# Patient Record
Sex: MALE | Race: WHITE | NOT HISPANIC OR LATINO | Employment: FULL TIME | URBAN - METROPOLITAN AREA
[De-identification: names, ages, dates, MRNs, and addresses within clinical notes are randomized per-mention and may not be internally consistent; named-entity substitution may affect disease eponyms.]

---

## 2021-06-01 ENCOUNTER — HOSPITAL ENCOUNTER (EMERGENCY)
Facility: HOSPITAL | Age: 56
Discharge: HOME | End: 2021-06-01
Attending: EMERGENCY MEDICINE
Payer: OTHER GOVERNMENT

## 2021-06-01 VITALS
TEMPERATURE: 99 F | OXYGEN SATURATION: 98 % | SYSTOLIC BLOOD PRESSURE: 156 MMHG | RESPIRATION RATE: 16 BRPM | HEIGHT: 74 IN | DIASTOLIC BLOOD PRESSURE: 91 MMHG | WEIGHT: 315 LBS | HEART RATE: 70 BPM | BODY MASS INDEX: 40.43 KG/M2

## 2021-06-01 DIAGNOSIS — M79.604 RIGHT LEG PAIN: ICD-10-CM

## 2021-06-01 DIAGNOSIS — M79.671 RIGHT FOOT PAIN: ICD-10-CM

## 2021-06-01 DIAGNOSIS — S82.831A CLOSED FRACTURE OF PROXIMAL END OF RIGHT FIBULA, UNSPECIFIED FRACTURE MORPHOLOGY, INITIAL ENCOUNTER: Primary | ICD-10-CM

## 2021-06-01 DIAGNOSIS — M25.571 RIGHT ANKLE PAIN: ICD-10-CM

## 2021-06-01 PROCEDURE — 99283 EMERGENCY DEPT VISIT LOW MDM: CPT | Mod: 25

## 2021-06-01 PROCEDURE — 29505 APPLICATION LONG LEG SPLINT: CPT | Mod: RT

## 2021-06-01 PROCEDURE — 25000003 PHARM REV CODE 250: Performed by: NURSE PRACTITIONER

## 2021-06-01 RX ORDER — HYDROCODONE BITARTRATE AND ACETAMINOPHEN 7.5; 325 MG/1; MG/1
1 TABLET ORAL EVERY 6 HOURS PRN
Qty: 12 TABLET | Refills: 0 | Status: ON HOLD | OUTPATIENT
Start: 2021-06-01 | End: 2023-06-27

## 2021-06-01 RX ORDER — HYDROCODONE BITARTRATE AND ACETAMINOPHEN 10; 325 MG/1; MG/1
1 TABLET ORAL
Status: COMPLETED | OUTPATIENT
Start: 2021-06-01 | End: 2021-06-01

## 2021-06-01 RX ORDER — ONDANSETRON 4 MG/1
4 TABLET, ORALLY DISINTEGRATING ORAL
Status: COMPLETED | OUTPATIENT
Start: 2021-06-01 | End: 2021-06-01

## 2021-06-01 RX ORDER — ONDANSETRON 2 MG/ML
4 INJECTION INTRAMUSCULAR; INTRAVENOUS
Status: DISCONTINUED | OUTPATIENT
Start: 2021-06-01 | End: 2021-06-01

## 2021-06-01 RX ADMIN — ONDANSETRON 4 MG: 4 TABLET, ORALLY DISINTEGRATING ORAL at 10:06

## 2021-06-01 RX ADMIN — HYDROCODONE BITARTRATE AND ACETAMINOPHEN 1 TABLET: 10; 325 TABLET ORAL at 10:06

## 2021-06-02 ENCOUNTER — TELEPHONE (OUTPATIENT)
Dept: ORTHOPEDICS | Facility: CLINIC | Age: 56
End: 2021-06-02

## 2021-06-04 ENCOUNTER — OFFICE VISIT (OUTPATIENT)
Dept: ORTHOPEDICS | Facility: CLINIC | Age: 56
End: 2021-06-04
Payer: OTHER GOVERNMENT

## 2021-06-04 VITALS
WEIGHT: 315 LBS | DIASTOLIC BLOOD PRESSURE: 94 MMHG | HEART RATE: 78 BPM | SYSTOLIC BLOOD PRESSURE: 192 MMHG | HEIGHT: 74 IN | BODY MASS INDEX: 40.43 KG/M2

## 2021-06-04 DIAGNOSIS — M25.561 RIGHT KNEE PAIN, UNSPECIFIED CHRONICITY: Primary | ICD-10-CM

## 2021-06-04 DIAGNOSIS — E66.01 OBESITY, CLASS III, BMI 40-49.9 (MORBID OBESITY): ICD-10-CM

## 2021-06-04 DIAGNOSIS — L40.9 PSORIASIS: ICD-10-CM

## 2021-06-04 DIAGNOSIS — S82.831A OTHER CLOSED FRACTURE OF PROXIMAL END OF RIGHT FIBULA, INITIAL ENCOUNTER: ICD-10-CM

## 2021-06-04 DIAGNOSIS — S93.401A MODERATE ANKLE SPRAIN, RIGHT, INITIAL ENCOUNTER: ICD-10-CM

## 2021-06-04 PROBLEM — E66.813 OBESITY, CLASS III, BMI 40-49.9 (MORBID OBESITY): Status: ACTIVE | Noted: 2021-06-04

## 2021-06-04 PROCEDURE — 99999 PR PBB SHADOW E&M-EST. PATIENT-LVL III: CPT | Mod: PBBFAC,,,

## 2021-06-04 PROCEDURE — 99202 PR OFFICE/OUTPT VISIT, NEW, LEVL II, 15-29 MIN: ICD-10-PCS | Mod: S$PBB,,, | Performed by: ORTHOPAEDIC SURGERY

## 2021-06-04 PROCEDURE — 99999 PR PBB SHADOW E&M-EST. PATIENT-LVL III: ICD-10-PCS | Mod: PBBFAC,,,

## 2021-06-04 PROCEDURE — 99202 OFFICE O/P NEW SF 15 MIN: CPT | Mod: S$PBB,,, | Performed by: ORTHOPAEDIC SURGERY

## 2021-06-04 PROCEDURE — 99213 OFFICE O/P EST LOW 20 MIN: CPT | Mod: PBBFAC

## 2021-06-08 ENCOUNTER — TELEPHONE (OUTPATIENT)
Dept: INTERNAL MEDICINE | Facility: CLINIC | Age: 56
End: 2021-06-08

## 2021-06-25 ENCOUNTER — HOSPITAL ENCOUNTER (OUTPATIENT)
Dept: RADIOLOGY | Facility: HOSPITAL | Age: 56
Discharge: HOME | End: 2021-06-25
Attending: ORTHOPAEDIC SURGERY
Payer: OTHER GOVERNMENT

## 2021-06-25 ENCOUNTER — OFFICE VISIT (OUTPATIENT)
Dept: ORTHOPEDICS | Facility: CLINIC | Age: 56
End: 2021-06-25
Payer: OTHER GOVERNMENT

## 2021-06-25 ENCOUNTER — HOSPITAL ENCOUNTER (OUTPATIENT)
Dept: RADIOLOGY | Facility: HOSPITAL | Age: 56
Discharge: HOME | End: 2021-06-25
Attending: PHYSICIAN ASSISTANT
Payer: OTHER GOVERNMENT

## 2021-06-25 VITALS
HEART RATE: 79 BPM | DIASTOLIC BLOOD PRESSURE: 97 MMHG | SYSTOLIC BLOOD PRESSURE: 176 MMHG | BODY MASS INDEX: 40.43 KG/M2 | WEIGHT: 315 LBS | HEIGHT: 74 IN

## 2021-06-25 DIAGNOSIS — S93.401A MODERATE ANKLE SPRAIN, RIGHT, INITIAL ENCOUNTER: ICD-10-CM

## 2021-06-25 DIAGNOSIS — S93.401A MODERATE ANKLE SPRAIN, RIGHT, INITIAL ENCOUNTER: Primary | ICD-10-CM

## 2021-06-25 DIAGNOSIS — S93.401S MODERATE RIGHT ANKLE SPRAIN, SEQUELA: ICD-10-CM

## 2021-06-25 DIAGNOSIS — S82.811D CLOSED TORUS FRACTURE OF PROXIMAL END OF RIGHT FIBULA WITH ROUTINE HEALING, SUBSEQUENT ENCOUNTER: Primary | ICD-10-CM

## 2021-06-25 DIAGNOSIS — M25.561 RIGHT KNEE PAIN, UNSPECIFIED CHRONICITY: ICD-10-CM

## 2021-06-25 PROCEDURE — 73564 X-RAY EXAM KNEE 4 OR MORE: CPT | Mod: 26,RT,, | Performed by: RADIOLOGY

## 2021-06-25 PROCEDURE — 73562 X-RAY EXAM OF KNEE 3: CPT | Mod: 26,LT,, | Performed by: RADIOLOGY

## 2021-06-25 PROCEDURE — 99999 PR PBB SHADOW E&M-EST. PATIENT-LVL III: CPT | Mod: PBBFAC,,, | Performed by: ORTHOPAEDIC SURGERY

## 2021-06-25 PROCEDURE — 99213 OFFICE O/P EST LOW 20 MIN: CPT | Mod: S$PBB,,, | Performed by: ORTHOPAEDIC SURGERY

## 2021-06-25 PROCEDURE — 73562 XR KNEE ORTHO RIGHT WITH FLEXION: ICD-10-PCS | Mod: 26,LT,, | Performed by: RADIOLOGY

## 2021-06-25 PROCEDURE — 73610 XR ANKLE COMPLETE 3 VIEW RIGHT: ICD-10-PCS | Mod: 26,RT,, | Performed by: RADIOLOGY

## 2021-06-25 PROCEDURE — 99213 PR OFFICE/OUTPT VISIT, EST, LEVL III, 20-29 MIN: ICD-10-PCS | Mod: S$PBB,,, | Performed by: ORTHOPAEDIC SURGERY

## 2021-06-25 PROCEDURE — 73564 X-RAY EXAM KNEE 4 OR MORE: CPT | Mod: TC,RT

## 2021-06-25 PROCEDURE — 73610 X-RAY EXAM OF ANKLE: CPT | Mod: TC,RT

## 2021-06-25 PROCEDURE — 99999 PR PBB SHADOW E&M-EST. PATIENT-LVL III: ICD-10-PCS | Mod: PBBFAC,,, | Performed by: ORTHOPAEDIC SURGERY

## 2021-06-25 PROCEDURE — 73610 X-RAY EXAM OF ANKLE: CPT | Mod: 26,RT,, | Performed by: RADIOLOGY

## 2021-06-25 PROCEDURE — 99213 OFFICE O/P EST LOW 20 MIN: CPT | Mod: PBBFAC,25 | Performed by: ORTHOPAEDIC SURGERY

## 2021-06-25 PROCEDURE — 73564 XR KNEE ORTHO RIGHT WITH FLEXION: ICD-10-PCS | Mod: 26,RT,, | Performed by: RADIOLOGY

## 2023-05-22 ENCOUNTER — HOSPITAL ENCOUNTER (EMERGENCY)
Facility: HOSPITAL | Age: 58
Discharge: HOME | End: 2023-05-22
Attending: EMERGENCY MEDICINE
Payer: OTHER GOVERNMENT

## 2023-05-22 VITALS
HEART RATE: 95 BPM | WEIGHT: 315 LBS | BODY MASS INDEX: 40.43 KG/M2 | TEMPERATURE: 99 F | DIASTOLIC BLOOD PRESSURE: 83 MMHG | OXYGEN SATURATION: 96 % | SYSTOLIC BLOOD PRESSURE: 145 MMHG | HEIGHT: 74 IN | RESPIRATION RATE: 14 BRPM

## 2023-05-22 DIAGNOSIS — E66.01 MORBID OBESITY: ICD-10-CM

## 2023-05-22 DIAGNOSIS — R53.83 FATIGUE: ICD-10-CM

## 2023-05-22 DIAGNOSIS — R79.89 ELEVATED BRAIN NATRIURETIC PEPTIDE (BNP) LEVEL: ICD-10-CM

## 2023-05-22 DIAGNOSIS — I48.91 NEW ONSET ATRIAL FIBRILLATION: Primary | ICD-10-CM

## 2023-05-22 DIAGNOSIS — I48.91 ATRIAL FIBRILLATION: ICD-10-CM

## 2023-05-22 LAB
ALBUMIN SERPL BCP-MCNC: 3.6 G/DL (ref 3.5–5.2)
ALP SERPL-CCNC: 68 U/L (ref 55–135)
ALT SERPL W/O P-5'-P-CCNC: 26 U/L (ref 10–44)
ANION GAP SERPL CALC-SCNC: 11 MMOL/L (ref 8–16)
AST SERPL-CCNC: 22 U/L (ref 10–40)
BASOPHILS # BLD AUTO: 0.04 K/UL (ref 0–0.2)
BASOPHILS NFR BLD: 0.6 % (ref 0–1.9)
BILIRUB SERPL-MCNC: 1.3 MG/DL (ref 0.1–1)
BNP SERPL-MCNC: 187 PG/ML (ref 0–99)
BUN SERPL-MCNC: 12 MG/DL (ref 6–20)
CALCIUM SERPL-MCNC: 9.2 MG/DL (ref 8.7–10.5)
CHLORIDE SERPL-SCNC: 103 MMOL/L (ref 95–110)
CO2 SERPL-SCNC: 22 MMOL/L (ref 23–29)
CREAT SERPL-MCNC: 0.8 MG/DL (ref 0.5–1.4)
DIFFERENTIAL METHOD: ABNORMAL
EOSINOPHIL # BLD AUTO: 0.1 K/UL (ref 0–0.5)
EOSINOPHIL NFR BLD: 1.6 % (ref 0–8)
ERYTHROCYTE [DISTWIDTH] IN BLOOD BY AUTOMATED COUNT: 14.8 % (ref 11.5–14.5)
EST. GFR  (NO RACE VARIABLE): >60 ML/MIN/1.73 M^2
GLUCOSE SERPL-MCNC: 151 MG/DL (ref 70–110)
HCT VFR BLD AUTO: 41.3 % (ref 40–54)
HCV AB SERPL QL IA: NEGATIVE
HEP C VIRUS HOLD SPECIMEN: NORMAL
HGB BLD-MCNC: 13.2 G/DL (ref 14–18)
HIV 1+2 AB+HIV1 P24 AG SERPL QL IA: NEGATIVE
IMM GRANULOCYTES # BLD AUTO: 0.03 K/UL (ref 0–0.04)
IMM GRANULOCYTES NFR BLD AUTO: 0.4 % (ref 0–0.5)
LYMPHOCYTES # BLD AUTO: 1.1 K/UL (ref 1–4.8)
LYMPHOCYTES NFR BLD: 16.5 % (ref 18–48)
MAGNESIUM SERPL-MCNC: 1.8 MG/DL (ref 1.6–2.6)
MCH RBC QN AUTO: 26.5 PG (ref 27–31)
MCHC RBC AUTO-ENTMCNC: 32 G/DL (ref 32–36)
MCV RBC AUTO: 83 FL (ref 82–98)
MONOCYTES # BLD AUTO: 0.8 K/UL (ref 0.3–1)
MONOCYTES NFR BLD: 11.1 % (ref 4–15)
NEUTROPHILS # BLD AUTO: 4.8 K/UL (ref 1.8–7.7)
NEUTROPHILS NFR BLD: 69.8 % (ref 38–73)
NRBC BLD-RTO: 0 /100 WBC
PLATELET # BLD AUTO: 154 K/UL (ref 150–450)
PMV BLD AUTO: 9.9 FL (ref 9.2–12.9)
POTASSIUM SERPL-SCNC: 3.9 MMOL/L (ref 3.5–5.1)
PROT SERPL-MCNC: 7.7 G/DL (ref 6–8.4)
RBC # BLD AUTO: 4.98 M/UL (ref 4.6–6.2)
SODIUM SERPL-SCNC: 136 MMOL/L (ref 136–145)
TROPONIN I SERPL DL<=0.01 NG/ML-MCNC: 0.01 NG/ML (ref 0–0.03)
TSH SERPL DL<=0.005 MIU/L-ACNC: 3.54 UIU/ML (ref 0.4–4)
WBC # BLD AUTO: 6.84 K/UL (ref 3.9–12.7)

## 2023-05-22 PROCEDURE — 80053 COMPREHEN METABOLIC PANEL: CPT | Performed by: EMERGENCY MEDICINE

## 2023-05-22 PROCEDURE — 99285 EMERGENCY DEPT VISIT HI MDM: CPT | Mod: 25

## 2023-05-22 PROCEDURE — 85025 COMPLETE CBC W/AUTO DIFF WBC: CPT | Performed by: EMERGENCY MEDICINE

## 2023-05-22 PROCEDURE — 63600175 PHARM REV CODE 636 W HCPCS: Performed by: EMERGENCY MEDICINE

## 2023-05-22 PROCEDURE — 96375 TX/PRO/DX INJ NEW DRUG ADDON: CPT

## 2023-05-22 PROCEDURE — 93005 ELECTROCARDIOGRAM TRACING: CPT

## 2023-05-22 PROCEDURE — 83735 ASSAY OF MAGNESIUM: CPT | Performed by: EMERGENCY MEDICINE

## 2023-05-22 PROCEDURE — 93010 EKG 12-LEAD: ICD-10-PCS | Mod: ,,, | Performed by: INTERNAL MEDICINE

## 2023-05-22 PROCEDURE — 96374 THER/PROPH/DIAG INJ IV PUSH: CPT

## 2023-05-22 PROCEDURE — 86803 HEPATITIS C AB TEST: CPT | Performed by: EMERGENCY MEDICINE

## 2023-05-22 PROCEDURE — 84443 ASSAY THYROID STIM HORMONE: CPT | Performed by: EMERGENCY MEDICINE

## 2023-05-22 PROCEDURE — 93010 ELECTROCARDIOGRAM REPORT: CPT | Mod: ,,, | Performed by: INTERNAL MEDICINE

## 2023-05-22 PROCEDURE — 25000003 PHARM REV CODE 250: Performed by: EMERGENCY MEDICINE

## 2023-05-22 PROCEDURE — 83880 ASSAY OF NATRIURETIC PEPTIDE: CPT | Performed by: EMERGENCY MEDICINE

## 2023-05-22 PROCEDURE — 87389 HIV-1 AG W/HIV-1&-2 AB AG IA: CPT | Performed by: EMERGENCY MEDICINE

## 2023-05-22 PROCEDURE — 84484 ASSAY OF TROPONIN QUANT: CPT | Performed by: EMERGENCY MEDICINE

## 2023-05-22 RX ORDER — ASPIRIN 325 MG
325 TABLET ORAL
Status: COMPLETED | OUTPATIENT
Start: 2023-05-22 | End: 2023-05-22

## 2023-05-22 RX ORDER — METOPROLOL SUCCINATE 25 MG/1
25 TABLET, EXTENDED RELEASE ORAL DAILY
Qty: 30 TABLET | Refills: 0 | Status: SHIPPED | OUTPATIENT
Start: 2023-05-22 | End: 2023-07-12 | Stop reason: SDUPTHER

## 2023-05-22 RX ORDER — FUROSEMIDE 20 MG/1
20 TABLET ORAL DAILY
Qty: 7 TABLET | Refills: 0 | Status: SHIPPED | OUTPATIENT
Start: 2023-05-22 | End: 2023-06-14

## 2023-05-22 RX ORDER — FUROSEMIDE 10 MG/ML
40 INJECTION INTRAMUSCULAR; INTRAVENOUS
Status: COMPLETED | OUTPATIENT
Start: 2023-05-22 | End: 2023-05-22

## 2023-05-22 RX ORDER — DILTIAZEM HYDROCHLORIDE 5 MG/ML
20 INJECTION INTRAVENOUS
Status: COMPLETED | OUTPATIENT
Start: 2023-05-22 | End: 2023-05-22

## 2023-05-22 RX ADMIN — ASPIRIN 325 MG: 325 TABLET ORAL at 07:05

## 2023-05-22 RX ADMIN — FUROSEMIDE 40 MG: 10 INJECTION, SOLUTION INTRAMUSCULAR; INTRAVENOUS at 07:05

## 2023-05-22 RX ADMIN — DILTIAZEM HYDROCHLORIDE 20 MG: 5 INJECTION INTRAVENOUS at 05:05

## 2023-05-22 NOTE — FIRST PROVIDER EVALUATION
Medical screening examination initiated.  I have conducted a focused provider triage encounter, findings are as follows:    Brief history of present illness:  reports fatigue, sob    There were no vitals filed for this visit.    Pertinent physical exam:  nad    Brief workup plan:  labs, ekg, imaging, further eval    Preliminary workup initiated; this workup will be continued and followed by the physician or advanced practice provider that is assigned to the patient when roomed.

## 2023-05-22 NOTE — ED PROVIDER NOTES
SCRIBE #1 NOTE: I, Ginna Mccarthy, am scribing for, and in the presence of, Mary Polk MD. I have scribed the entire note.       History     Chief Complaint   Patient presents with    Fatigue     Fatigue started last 3 days, short of breath, denies chest pain.     Review of patient's allergies indicates:   Allergen Reactions    Sulfa (sulfonamide antibiotics) Rash         History of Present Illness     HPI    5/22/2023, 4:53 PM  History obtained from the patient      History of Present Illness: Roland Lopez is a 58 y.o. male patient with a PMHx of HTN who presents to the Emergency Department for evaluation of fatigue which onset in the past few days. Pt describes being overly tired and sleeping significantly more so than normal. Symptoms are constant and moderate in severity. No mitigating or exacerbating factors reported. Associated sxs include some intermittent CP and SOB. Patient denies any dizziness, syncope, fever, N/V/D, headache, congestion, and all other sxs at this time. No Prior Tx reported. No further complaints or concerns at this time.       Arrival mode: Personal vehicle    PCP: Lashay Asencio MD        Past Medical History:  Past Medical History:   Diagnosis Date    Hypertension     EVELYN (obstructive sleep apnea)        Past Surgical History:  History reviewed. No pertinent surgical history.      Family History:  History reviewed. No pertinent family history.    Social History:  Social History     Tobacco Use    Smoking status: Never    Smokeless tobacco: Never   Substance and Sexual Activity    Alcohol use: Not Currently     Alcohol/week: 1.0 standard drink     Types: 1 Glasses of wine per week    Drug use: Never    Sexual activity: Yes     Partners: Female        Review of Systems     Review of Systems   Constitutional:  Positive for fatigue. Negative for fever.   HENT:  Negative for congestion and sore throat.    Respiratory:  Positive for shortness of breath.    Cardiovascular:  Positive  "for chest pain.   Gastrointestinal:  Negative for diarrhea, nausea and vomiting.   Genitourinary:  Negative for dysuria.   Musculoskeletal:  Negative for back pain.   Skin:  Negative for rash.   Neurological:  Negative for weakness and headaches.   Hematological:  Does not bruise/bleed easily.      Physical Exam     Initial Vitals [05/22/23 1604]   BP Pulse Resp Temp SpO2   (!) 177/100 97 20 99.8 °F (37.7 °C) 96 %      MAP       --          Physical Exam  Nursing Notes and Vital Signs Reviewed.  Constitutional: Patient is in no acute distress. Morbidly obese.  Head: Atraumatic. Normocephalic.  Eyes: PERRL. EOM intact. Conjunctivae are not pale. No scleral icterus.  ENT: Mucous membranes are moist. Oropharynx is clear and symmetric.    Neck: Supple. Full ROM. No lymphadenopathy.  Cardiovascular: Regular rate. Irregularly irregular rhythm. No murmurs, rubs, or gallops. Distal pulses are 2+ and symmetric.  Pulmonary/Chest: No respiratory distress. Clear to auscultation bilaterally. No wheezing or rales.  Abdominal: Soft and non-distended.  There is no tenderness.  No rebound, guarding, or rigidity. Good bowel sounds.  Genitourinary: No CVA tenderness  Musculoskeletal: Moves all extremities. No obvious deformities. No edema. No calf tenderness.  Skin: Warm and dry.  Neurological:  Alert, awake, and appropriate.  Normal speech.  No acute focal neurological deficits are appreciated.  Psychiatric: Normal affect. Good eye contact. Appropriate in content.     ED Course   Procedures  ED Vital Signs:  Vitals:    05/22/23 1604 05/22/23 1731 05/22/23 1804 05/22/23 1817   BP: (!) 177/100 (!) 151/81 137/78    Pulse: 97   94   Resp: 20   20   Temp: 99.8 °F (37.7 °C)      TempSrc: Oral      SpO2: 96%   96%   Weight: (!) 159.8 kg (352 lb 4.7 oz)      Height: 6' 2" (1.88 m)       05/22/23 1906 05/22/23 1910   BP: (!) 145/83    Pulse: 95    Resp: 14    Temp:  98.9 °F (37.2 °C)   TempSrc:     SpO2: 96%    Weight:     Height:   "       Abnormal Lab Results:  Labs Reviewed   CBC W/ AUTO DIFFERENTIAL - Abnormal; Notable for the following components:       Result Value    Hemoglobin 13.2 (*)     MCH 26.5 (*)     RDW 14.8 (*)     Lymph % 16.5 (*)     All other components within normal limits   COMPREHENSIVE METABOLIC PANEL - Abnormal; Notable for the following components:    CO2 22 (*)     Glucose 151 (*)     Total Bilirubin 1.3 (*)     All other components within normal limits   B-TYPE NATRIURETIC PEPTIDE - Abnormal; Notable for the following components:     (*)     All other components within normal limits   HIV 1 / 2 ANTIBODY    Narrative:     Release to patient->Immediate   HEPATITIS C ANTIBODY    Narrative:     Release to patient->Immediate   HEP C VIRUS HOLD SPECIMEN    Narrative:     Release to patient->Immediate   TROPONIN I   TSH   MAGNESIUM        All Lab Results:  Results for orders placed or performed during the hospital encounter of 05/22/23   HIV 1/2 Ag/Ab (4th Gen)   Result Value Ref Range    HIV 1/2 Ag/Ab Negative Negative   Hepatitis C Antibody   Result Value Ref Range    Hepatitis C Ab Negative Negative   HCV Virus Hold Specimen   Result Value Ref Range    HEP C Virus Hold Specimen Hold for HCV sendout    CBC auto differential   Result Value Ref Range    WBC 6.84 3.90 - 12.70 K/uL    RBC 4.98 4.60 - 6.20 M/uL    Hemoglobin 13.2 (L) 14.0 - 18.0 g/dL    Hematocrit 41.3 40.0 - 54.0 %    MCV 83 82 - 98 fL    MCH 26.5 (L) 27.0 - 31.0 pg    MCHC 32.0 32.0 - 36.0 g/dL    RDW 14.8 (H) 11.5 - 14.5 %    Platelets 154 150 - 450 K/uL    MPV 9.9 9.2 - 12.9 fL    Immature Granulocytes 0.4 0.0 - 0.5 %    Gran # (ANC) 4.8 1.8 - 7.7 K/uL    Immature Grans (Abs) 0.03 0.00 - 0.04 K/uL    Lymph # 1.1 1.0 - 4.8 K/uL    Mono # 0.8 0.3 - 1.0 K/uL    Eos # 0.1 0.0 - 0.5 K/uL    Baso # 0.04 0.00 - 0.20 K/uL    nRBC 0 0 /100 WBC    Gran % 69.8 38.0 - 73.0 %    Lymph % 16.5 (L) 18.0 - 48.0 %    Mono % 11.1 4.0 - 15.0 %    Eosinophil % 1.6 0.0 -  8.0 %    Basophil % 0.6 0.0 - 1.9 %    Differential Method Automated    Comprehensive metabolic panel   Result Value Ref Range    Sodium 136 136 - 145 mmol/L    Potassium 3.9 3.5 - 5.1 mmol/L    Chloride 103 95 - 110 mmol/L    CO2 22 (L) 23 - 29 mmol/L    Glucose 151 (H) 70 - 110 mg/dL    BUN 12 6 - 20 mg/dL    Creatinine 0.8 0.5 - 1.4 mg/dL    Calcium 9.2 8.7 - 10.5 mg/dL    Total Protein 7.7 6.0 - 8.4 g/dL    Albumin 3.6 3.5 - 5.2 g/dL    Total Bilirubin 1.3 (H) 0.1 - 1.0 mg/dL    Alkaline Phosphatase 68 55 - 135 U/L    AST 22 10 - 40 U/L    ALT 26 10 - 44 U/L    Anion Gap 11 8 - 16 mmol/L    eGFR >60 >60 mL/min/1.73 m^2   Troponin I #1   Result Value Ref Range    Troponin I 0.011 0.000 - 0.026 ng/mL   BNP   Result Value Ref Range     (H) 0 - 99 pg/mL   TSH   Result Value Ref Range    TSH 3.541 0.400 - 4.000 uIU/mL   Magnesium   Result Value Ref Range    Magnesium 1.8 1.6 - 2.6 mg/dL         Imaging Results:  Imaging Results              X-Ray Chest PA And Lateral (Final result)  Result time 05/22/23 17:25:38      Final result by Brock Galvez MD (05/22/23 17:25:38)                   Impression:      As above      Electronically signed by: Brock Galvez  Date:    05/22/2023  Time:    17:25               Narrative:    EXAMINATION:  XR CHEST PA AND LATERAL    CLINICAL HISTORY:  shortness of breath;    TECHNIQUE:  PA and lateral views of the chest were performed.    COMPARISON:  None    FINDINGS:  Cardiomegaly with mild basilar atelectasis.  Flattening of the diaphragms.  Mild central pulmonary vascular crowding.  Element of CHF not excluded.                                       The EKG was ordered, reviewed, and independently interpreted by the ED provider.  Interpretation time: 16:05  Rate: 125 BPM  Rhythm: atrial fibrillation with rapid ventricular response with premature ventricular or aberrantly conducted complexes  Interpretation: Abnormal ECG. No STEMI.    The EKG was ordered, reviewed, and  independently interpreted by the ED provider.  Interpretation time: 18:33  Rate: 85 BPM  Rhythm: atrial fibrillation  Interpretation: Left axis deviation. Incomplete right bundle branch block. Abnormal ECG. No STEMI.         The Emergency Provider reviewed the vital signs and test results, which are outlined above.     ED Discussion       7:00 PM: Reassessed pt at this time. Discussed with pt all pertinent ED information and results. Discussed pt dx and plan of tx. Gave pt all f/u and return to the ED instructions. All questions and concerns were addressed at this time. Pt expresses understanding of information and instructions, and is comfortable with plan to discharge. Pt is stable for discharge.    I discussed with patient and/or family/caretaker that evaluation in the ED does not suggest any emergent or life threatening medical conditions requiring immediate intervention beyond what was provided in the ED, and I believe patient is safe for discharge.  Regardless, an unremarkable evaluation in the ED does not preclude the development or presence of a serious of life threatening condition. As such, patient was instructed to return immediately for any worsening or change in current symptoms.        Medical Decision Making:   Differential Diagnosis:   1. ACS  2. CHF  3. Dehydration  4. Diabetes  Clinical Tests:   Lab Tests: Ordered and Reviewed  Radiological Study: Ordered and Reviewed  Medical Tests: Ordered and Reviewed  ED Management:  Hx of morbid obesity, chronic hypertension, EVELYN wears CPAP machine at night, presents with generalized weakness, fatigue, intermittent chest pain and shortness of breath, ECG reviewed and consistent with afib with rvr, rate controlled with one dose of iv cardizem, lab work reviewed bnp mildly elevated, tropoonin normla, , labs otherwise normal, CXR reviewed shows mild pulmonary congestion, CHADS2 Score of 1 thus no indication for chronic anticoagulation at this time, no  indication for admission, [facility] however [facility] referral placed through [facility] if unable to [facility], advised weight loss, will start on low dose metoprolol for rate control, low dose lasix, baby aspirin, will need further workup outpatient for his afib, verbalized understanding and given reasons to return.          ED Medication(s):  Medications   diltiaZEM injection 20 mg (20 mg Intravenous Given 5/22/23 1731)   furosemide injection 40 mg (40 mg Intravenous Given 5/22/23 1902)   aspirin tablet 325 mg (325 mg Oral Given 5/22/23 1902)       Discharge Medication List as of 5/22/2023  6:57 PM        START taking these medications    Details   furosemide (LASIX) 20 MG tablet Take 1 tablet (20 mg total) by mouth once daily. for 7 days, Starting Mon 5/22/2023, Until Mon 5/29/2023, Print      metoprolol succinate (TOPROL-XL) 25 MG 24 hr tablet Take 1 tablet (25 mg total) by mouth once daily., Starting Mon 5/22/2023, Until Tue 5/21/2024, Print              Follow-up Information       Lashay Asencio MD. Schedule an appointment as soon as possible for a visit in 2 days.    Specialty: Internal Medicine  Contact information:  [address] [facility]  [facility] [address]  [phone]               [address]. Schedule an appointment as soon as possible for a visit in 2 days.    Specialty: Cardiology  Why: Return to the Emergency Room, If symptoms worsen  Contact information:  [address] [facility] [id number] [phone]  Additional information:  Please park on the [address] side and use the Clinic entrance. Check in on the 3rd floor, to the right.                               Scribe Attestation:   Scribe #1: I performed the above scribed service and the documentation accurately describes the services I performed. I attest to the accuracy of the note.     Attending:   Physician Attestation Statement for Scribe #1:  I, Mary Polk MD, personally performed the services described in this documentation, as scribed by Ginna Mccarthy, in my presence, and it is both accurate and complete.           Clinical Impression       ICD-10-CM ICD-9-CM   1. New onset atrial fibrillation  I48.91 427.31   2. Fatigue  R53.83 780.79   3. Atrial fibrillation  I48.91 427.31   4. Elevated brain natriuretic peptide (BNP) level  R79.89 790.99   5. Morbid obesity  E66.01 278.01       Disposition:   Disposition: Discharged  Condition: Stable      Mary Polk MD  05/22/23 1931

## 2023-06-14 ENCOUNTER — OFFICE VISIT (OUTPATIENT)
Dept: CARDIOLOGY | Facility: CLINIC | Age: 58
End: 2023-06-14
Payer: OTHER GOVERNMENT

## 2023-06-14 VITALS
OXYGEN SATURATION: 97 % | SYSTOLIC BLOOD PRESSURE: 132 MMHG | HEIGHT: 74 IN | HEART RATE: 83 BPM | BODY MASS INDEX: 40.43 KG/M2 | DIASTOLIC BLOOD PRESSURE: 86 MMHG | WEIGHT: 315 LBS

## 2023-06-14 DIAGNOSIS — G47.33 OBSTRUCTIVE SLEEP APNEA: ICD-10-CM

## 2023-06-14 DIAGNOSIS — I48.91 NEW ONSET ATRIAL FIBRILLATION: ICD-10-CM

## 2023-06-14 DIAGNOSIS — I10 HYPERTENSION, UNSPECIFIED TYPE: ICD-10-CM

## 2023-06-14 DIAGNOSIS — E66.01 OBESITY, CLASS III, BMI 40-49.9 (MORBID OBESITY): Primary | ICD-10-CM

## 2023-06-14 PROCEDURE — 93010 ELECTROCARDIOGRAM REPORT: CPT | Mod: ,,, | Performed by: INTERNAL MEDICINE

## 2023-06-14 PROCEDURE — 99204 PR OFFICE/OUTPT VISIT, NEW, LEVL IV, 45-59 MIN: ICD-10-PCS | Mod: S$PBB,,, | Performed by: INTERNAL MEDICINE

## 2023-06-14 PROCEDURE — 99999 PR PBB SHADOW E&M-EST. PATIENT-LVL IV: ICD-10-PCS | Mod: PBBFAC,,, | Performed by: INTERNAL MEDICINE

## 2023-06-14 PROCEDURE — 93010 EKG 12-LEAD: ICD-10-PCS | Mod: ,,, | Performed by: INTERNAL MEDICINE

## 2023-06-14 PROCEDURE — 99214 OFFICE O/P EST MOD 30 MIN: CPT | Mod: PBBFAC | Performed by: INTERNAL MEDICINE

## 2023-06-14 PROCEDURE — 99999 PR PBB SHADOW E&M-EST. PATIENT-LVL IV: CPT | Mod: PBBFAC,,, | Performed by: INTERNAL MEDICINE

## 2023-06-14 PROCEDURE — 99204 OFFICE O/P NEW MOD 45 MIN: CPT | Mod: S$PBB,,, | Performed by: INTERNAL MEDICINE

## 2023-06-14 PROCEDURE — 93005 ELECTROCARDIOGRAM TRACING: CPT

## 2023-06-14 RX ORDER — LISINOPRIL AND HYDROCHLOROTHIAZIDE 12.5; 2 MG/1; MG/1
2 TABLET ORAL DAILY
COMMUNITY
Start: 2023-03-13 | End: 2023-07-12 | Stop reason: SDUPTHER

## 2023-06-14 RX ORDER — INSULIN GLARGINE-YFGN 100 [IU]/ML
INJECTION, SOLUTION SUBCUTANEOUS
COMMUNITY
Start: 2023-03-09

## 2023-06-14 RX ORDER — AMIODARONE HYDROCHLORIDE 200 MG/1
200 TABLET ORAL DAILY
Qty: 30 TABLET | Refills: 11 | Status: SHIPPED | OUTPATIENT
Start: 2023-06-14 | End: 2024-06-13

## 2023-06-14 RX ORDER — CHOLECALCIFEROL (VITAMIN D3) 50 MCG
50 TABLET ORAL
COMMUNITY
Start: 2023-03-13

## 2023-06-14 RX ORDER — ATORVASTATIN CALCIUM 40 MG/1
20 TABLET, FILM COATED ORAL
COMMUNITY
Start: 2023-03-13 | End: 2023-07-12 | Stop reason: SDUPTHER

## 2023-06-14 RX ORDER — AMLODIPINE BESYLATE 10 MG/1
10 TABLET ORAL
COMMUNITY
Start: 2023-03-13

## 2023-06-14 RX ORDER — CALCIPOTRIENE 50 UG/G
CREAM TOPICAL
COMMUNITY
Start: 2023-03-13

## 2023-06-14 RX ORDER — METFORMIN HYDROCHLORIDE 1000 MG/1
1000 TABLET ORAL
COMMUNITY
Start: 2023-03-13

## 2023-06-14 RX ORDER — FUROSEMIDE 20 MG/1
20 TABLET ORAL DAILY
Qty: 30 TABLET | Refills: 11 | Status: SHIPPED | OUTPATIENT
Start: 2023-06-14 | End: 2023-07-12 | Stop reason: SDUPTHER

## 2023-06-14 NOTE — H&P (VIEW-ONLY)
Subjective:   Patient ID:  Roland Lopez is a 58 y.o. male who presents for evaluation of No chief complaint on file.      HPI  58-year-old male, morbid obesity, obstructive sleep apnea on CPAP.    Smoked only a few times in his teens.  .    Was recently in the emergency room on May 22nd with fatigue found to have AFib with RVR.    Started on Eliquis and metoprolol by the emergency room Lasix.    He reports mild improvement symptoms.  [facility]  Has not had any further workup.    Denies any excessive caffeine.  No alcohol.    Denies any chest pain  Past Medical History:   Diagnosis Date    Hypertension     EVELYN (obstructive sleep apnea)        History reviewed. No pertinent surgical history.    Social History     Tobacco Use    Smoking status: Never    Smokeless tobacco: Never   Substance Use Topics    Alcohol use: Not Currently     Alcohol/week: 1.0 standard drink     Types: 1 Glasses of wine per week    Drug use: Never       History reviewed. No pertinent family history.    Review of Systems   Constitutional: Positive for malaise/fatigue.   Cardiovascular:  Positive for dyspnea on exertion and palpitations. Negative for chest pain and syncope.   Genitourinary: Negative.    Neurological: Negative.      Current Outpatient Medications on File Prior to Visit   Medication Sig    amLODIPine (NORVASC) 10 MG tablet 10 mg.    apixaban (ELIQUIS) 5 mg Tab 5 mg.    atorvastatin (LIPITOR) 40 MG tablet 20 mg.    calcipotriene (DOVONOX) 0.005 % cream APPLY MODERATE AMOUNT TOPICALLY TWICE A DAY FOR PLAQUE PSORIASIS    cholecalciferol, vitamin D3, (VITAMIN D3) 50 mcg (2,000 unit) Tab 50 mcg.    HYDROcodone-acetaminophen (NORCO) 7.5-325 mg per tablet Take 1 tablet by mouth every 6 (six) hours as needed for Pain.    insulin glargine-yfgn 100 unit/mL Soln INJECT 20 UNITS SUBCUTANEOUSLY AT BEDTIME FOR DIABETES    lisinopriL-hydrochlorothiazide (PRINZIDE,ZESTORETIC) 20-12.5 mg per tablet Take 2 tablets by mouth once  daily.    metFORMIN (GLUCOPHAGE) 1000 MG tablet 1,000 mg.    metoprolol succinate (TOPROL-XL) 25 MG 24 hr tablet Take 1 tablet (25 mg total) by mouth once daily.    semaglutide (OZEMPIC) 0.25 mg or 0.5 mg(2 mg/1.5 mL) pen injector INJECT SEMAGLUTIDE SUBCUTANEOUSLY AS DIRECTED FOR DIABETES 0.25MG UNDER THE SKIN INJECTION WEEKLY FOR FOUR WEEKS, THEN INCREASE TO 0.5MG INJECTION UNDER SKIN WEEKLY.    [DISCONTINUED] furosemide (LASIX) 20 MG tablet Take 1 tablet (20 mg total) by mouth once daily. for 7 days     No current facility-administered medications on file prior to visit.       Objective:   Objective:  Wt Readings from Last 3 Encounters:   06/14/23 (!) 160.7 kg (354 lb 4.5 oz)   05/22/23 (!) 159.8 kg (352 lb 4.7 oz)   06/25/21 (!) 166.9 kg (368 lb)     Temp Readings from Last 3 Encounters:   05/22/23 98.9 °F (37.2 °C)   06/01/21 98.6 °F (37 °C) (Oral)   10/30/16 98.2 °F (36.8 °C) (Oral)     BP Readings from Last 3 Encounters:   06/14/23 132/86   05/22/23 (!) 145/83   06/25/21 (!) 176/97     Pulse Readings from Last 3 Encounters:   06/14/23 83   05/22/23 95   06/25/21 79       Physical Exam  Vitals reviewed.   Constitutional:       Appearance: He is well-developed.   Neck:      Vascular: No carotid bruit.   Cardiovascular:      Rate and Rhythm: Normal rate. Rhythm irregular.      Pulses: Intact distal pulses.      Heart sounds: Normal heart sounds. No murmur heard.  Pulmonary:      Breath sounds: Normal breath sounds.   Neurological:      Mental Status: He is oriented to person, place, and time.       No results found for: CHOL  No results found for: HDL  No results found for: LDLCALC  No results found for: TRIG  No results found for: CHOLHDL    Chemistry        Component Value Date/Time     05/22/2023 1740    K 3.9 05/22/2023 1740     05/22/2023 1740    CO2 22 (L) 05/22/2023 1740    BUN 12 05/22/2023 1740    CREATININE 0.8 05/22/2023 1740     (H) 05/22/2023 1740        Component Value Date/Time     CALCIUM 9.2 05/22/2023 1740    ALKPHOS 68 05/22/2023 1740    AST 22 05/22/2023 1740    ALT 26 05/22/2023 1740    BILITOT 1.3 (H) 05/22/2023 1740    ESTGFRAFRICA >60 10/30/2016 1012    EGFRNONAA >60 10/30/2016 1012          Lab Results   Component Value Date    TSH 3.541 05/22/2023     Lab Results   Component Value Date    INR 1.1 10/30/2016     Lab Results   Component Value Date    WBC 6.84 05/22/2023    HGB 13.2 (L) 05/22/2023    HCT 41.3 05/22/2023    MCV 83 05/22/2023     05/22/2023     BNP  @LABRCNTIP(BNP,BNPTRIAGEBLO)@  CrCl cannot be calculated (Patient's most recent lab result is older than the maximum 7 days allowed.).     Imaging:  ======  No results found for this or any previous visit.    No results found for this or any previous visit.    No results found for this or any previous visit.    Results for orders placed during the hospital encounter of 05/22/23    X-Ray Chest PA And Lateral    Narrative  EXAMINATION:  XR CHEST PA AND LATERAL    CLINICAL HISTORY:  shortness of breath;    TECHNIQUE:  PA and lateral views of the chest were performed.    COMPARISON:  None    FINDINGS:  Cardiomegaly with mild basilar atelectasis.  Flattening of the diaphragms.  Mild central pulmonary vascular crowding.  Element of CHF not excluded.    Impression  As above      Electronically signed by: Brock Galvez  Date:    05/22/2023  Time:    17:25    No results found for this or any previous visit.    No valid procedures specified.    Diagnostic Results:  ECG: Reviewed  AFib  The ASCVD Risk score (Jn DK, et al., 2019) failed to calculate for the following reasons:    Cannot find a previous HDL lab    Cannot find a previous total cholesterol lab    Assessment and Plan:   Obesity, Class III, BMI 40-49.9 (morbid obesity)    New onset atrial fibrillation  -     Ambulatory referral/consult to Cardiology  -     IN OFFICE EKG 12-LEAD (to Muse)  -     furosemide (LASIX) 20 MG tablet; Take 1 tablet (20 mg total) by mouth  once daily.  Dispense: 30 tablet; Refill: 11  -     amiodarone (PACERONE) 200 MG Tab; Take 1 tablet (200 mg total) by mouth once daily.  Dispense: 30 tablet; Refill: 11  -     Echo; Future    Obstructive sleep apnea    Hypertension, unspecified type      Continue with Eliquis, Toprol and Lasix.    Will add amiodarone.    Plan for SOFYA cardioversion in 2 weeks.    Reviewed all tests and above medical conditions with patient in detail and formulated treatment plan.  Risk factor modification discussed.   Cardiac low salt diet discussed.  Maintaining healthy weight and weight loss goals were discussed in clinic.    Follow up after SOFYA cardioversion

## 2023-06-27 ENCOUNTER — ANESTHESIA (OUTPATIENT)
Dept: CARDIOLOGY | Facility: HOSPITAL | Age: 58
End: 2023-06-27
Payer: OTHER GOVERNMENT

## 2023-06-27 ENCOUNTER — ANESTHESIA EVENT (OUTPATIENT)
Dept: CARDIOLOGY | Facility: HOSPITAL | Age: 58
End: 2023-06-27
Payer: OTHER GOVERNMENT

## 2023-06-27 ENCOUNTER — HOSPITAL ENCOUNTER (OUTPATIENT)
Facility: HOSPITAL | Age: 58
Discharge: HOME | End: 2023-06-27
Attending: INTERNAL MEDICINE | Admitting: INTERNAL MEDICINE
Payer: OTHER GOVERNMENT

## 2023-06-27 DIAGNOSIS — I48.91 NEW ONSET A-FIB: ICD-10-CM

## 2023-06-27 DIAGNOSIS — I48.91 ATRIAL FIBRILLATION STATUS POST CARDIOVERSION: Primary | ICD-10-CM

## 2023-06-27 DIAGNOSIS — I48.91 A-FIB: ICD-10-CM

## 2023-06-27 DIAGNOSIS — I48.0 PAROXYSMAL ATRIAL FIBRILLATION: ICD-10-CM

## 2023-06-27 DIAGNOSIS — I48.91 ATRIAL FIBRILLATION STATUS POST CARDIOVERSION: ICD-10-CM

## 2023-06-27 LAB
ANION GAP SERPL CALC-SCNC: 9 MMOL/L (ref 8–16)
APTT PPP: 25.7 SEC (ref 21–32)
BASOPHILS # BLD AUTO: 0.03 K/UL (ref 0–0.2)
BASOPHILS NFR BLD: 0.5 % (ref 0–1.9)
BSA FOR ECHO PROCEDURE: 2.8 M2
BUN SERPL-MCNC: 11 MG/DL (ref 6–20)
CALCIUM SERPL-MCNC: 9.1 MG/DL (ref 8.7–10.5)
CHLORIDE SERPL-SCNC: 107 MMOL/L (ref 95–110)
CO2 SERPL-SCNC: 26 MMOL/L (ref 23–29)
CREAT SERPL-MCNC: 0.7 MG/DL (ref 0.5–1.4)
DIFFERENTIAL METHOD: ABNORMAL
EJECTION FRACTION: 55 %
EOSINOPHIL # BLD AUTO: 0.1 K/UL (ref 0–0.5)
EOSINOPHIL NFR BLD: 2.3 % (ref 0–8)
ERYTHROCYTE [DISTWIDTH] IN BLOOD BY AUTOMATED COUNT: 14.6 % (ref 11.5–14.5)
EST. GFR  (NO RACE VARIABLE): >60 ML/MIN/1.73 M^2
GLUCOSE SERPL-MCNC: 134 MG/DL (ref 70–110)
HCT VFR BLD AUTO: 40.4 % (ref 40–54)
HGB BLD-MCNC: 13.1 G/DL (ref 14–18)
IMM GRANULOCYTES # BLD AUTO: 0.01 K/UL (ref 0–0.04)
IMM GRANULOCYTES NFR BLD AUTO: 0.2 % (ref 0–0.5)
INR PPP: 1.1 (ref 0.8–1.2)
LYMPHOCYTES # BLD AUTO: 1.3 K/UL (ref 1–4.8)
LYMPHOCYTES NFR BLD: 20.6 % (ref 18–48)
MCH RBC QN AUTO: 27.2 PG (ref 27–31)
MCHC RBC AUTO-ENTMCNC: 32.4 G/DL (ref 32–36)
MCV RBC AUTO: 84 FL (ref 82–98)
MONOCYTES # BLD AUTO: 0.5 K/UL (ref 0.3–1)
MONOCYTES NFR BLD: 7.5 % (ref 4–15)
NEUTROPHILS # BLD AUTO: 4.2 K/UL (ref 1.8–7.7)
NEUTROPHILS NFR BLD: 68.9 % (ref 38–73)
NRBC BLD-RTO: 0 /100 WBC
PLATELET # BLD AUTO: 158 K/UL (ref 150–450)
PMV BLD AUTO: 9.6 FL (ref 9.2–12.9)
POCT GLUCOSE: 130 MG/DL (ref 70–110)
POTASSIUM SERPL-SCNC: 3.9 MMOL/L (ref 3.5–5.1)
PROTHROMBIN TIME: 11.8 SEC (ref 9–12.5)
RA PRESSURE: 3 MMHG
RBC # BLD AUTO: 4.82 M/UL (ref 4.6–6.2)
SODIUM SERPL-SCNC: 142 MMOL/L (ref 136–145)
WBC # BLD AUTO: 6.12 K/UL (ref 3.9–12.7)

## 2023-06-27 PROCEDURE — 80048 BASIC METABOLIC PNL TOTAL CA: CPT | Performed by: INTERNAL MEDICINE

## 2023-06-27 PROCEDURE — 37000009 HC ANESTHESIA EA ADD 15 MINS: Performed by: INTERNAL MEDICINE

## 2023-06-27 PROCEDURE — 85610 PROTHROMBIN TIME: CPT | Performed by: INTERNAL MEDICINE

## 2023-06-27 PROCEDURE — 85025 COMPLETE CBC W/AUTO DIFF WBC: CPT | Performed by: INTERNAL MEDICINE

## 2023-06-27 PROCEDURE — 93010 ELECTROCARDIOGRAM REPORT: CPT | Mod: ,,, | Performed by: INTERNAL MEDICINE

## 2023-06-27 PROCEDURE — 25000003 PHARM REV CODE 250: Performed by: INTERNAL MEDICINE

## 2023-06-27 PROCEDURE — 25000003 PHARM REV CODE 250

## 2023-06-27 PROCEDURE — 93010 EKG 12-LEAD: ICD-10-PCS | Mod: 76,,, | Performed by: INTERNAL MEDICINE

## 2023-06-27 PROCEDURE — 85730 THROMBOPLASTIN TIME PARTIAL: CPT | Performed by: INTERNAL MEDICINE

## 2023-06-27 PROCEDURE — 92960 CARDIOVERSION ELECTRIC EXT: CPT | Performed by: INTERNAL MEDICINE

## 2023-06-27 PROCEDURE — 37000008 HC ANESTHESIA 1ST 15 MINUTES: Performed by: INTERNAL MEDICINE

## 2023-06-27 PROCEDURE — 93005 ELECTROCARDIOGRAM TRACING: CPT

## 2023-06-27 PROCEDURE — 63600175 PHARM REV CODE 636 W HCPCS

## 2023-06-27 RX ORDER — LIDOCAINE HYDROCHLORIDE 10 MG/ML
INJECTION, SOLUTION EPIDURAL; INFILTRATION; INTRACAUDAL; PERINEURAL
Status: DISCONTINUED | OUTPATIENT
Start: 2023-06-27 | End: 2023-06-27

## 2023-06-27 RX ORDER — PROPOFOL 10 MG/ML
VIAL (ML) INTRAVENOUS
Status: DISCONTINUED | OUTPATIENT
Start: 2023-06-27 | End: 2023-06-27

## 2023-06-27 RX ADMIN — PROPOFOL 50 MG: 10 INJECTION, EMULSION INTRAVENOUS at 12:06

## 2023-06-27 RX ADMIN — PROPOFOL 100 MG: 10 INJECTION, EMULSION INTRAVENOUS at 12:06

## 2023-06-27 RX ADMIN — LIDOCAINE HYDROCHLORIDE 100 MG: 10 SOLUTION INTRAVENOUS at 12:06

## 2023-06-27 NOTE — TRANSFER OF CARE
"Anesthesia Transfer of Care Note    Patient: Roland Lopez    Procedure(s) Performed: Procedure(s) (LRB):  Transesophageal echo (SOFYA) intra-procedure log documentation (N/A)    Patient location: PACU    Anesthesia Type: MAC    Transport from OR: Transported from OR on room air with adequate spontaneous ventilation    Post pain: adequate analgesia    Post assessment: no apparent anesthetic complications    Post vital signs: stable    Level of consciousness: sedated    Nausea/Vomiting: no nausea/vomiting    Complications: none    Transfer of care protocol was followed      Last vitals:   Visit Vitals  Pulse 71   Temp 36.6 °C (97.9 °F) (Temporal)   Ht 6' 2" (1.88 m)   Wt (!) 150.6 kg (332 lb)   SpO2 97%   BMI 42.63 kg/m²     "

## 2023-06-27 NOTE — DISCHARGE SUMMARY
[facility] Cath Lab ([facility])  Cardiology  Discharge Summary      Patient Name: Roland Lopez  MRN: [id number]  Admission Date: 6/27/2023  Hospital Length of Stay: 0 days  Discharge Date and Time: No discharge date for patient encounter.  Attending Physician: Santy Martinez MD    Discharging Provider: Santy Martinez MD  Primary Care Physician: Javier Pompa NP    HPI:   No notes on file    Procedure(s) (LRB):  Transesophageal echo (MINA) intra-procedure log documentation (N/A)     Indwelling Lines/Drains at time of discharge:  Lines/Drains/Airways       None                   Hospital Course:  No notes on file    Goals of Care Treatment Preferences:         Consults:     Significant Diagnostic Studies: N/A  Successful mina dccv    Pending Diagnostic Studies:       Procedure Component Value Units Date/Time    Transesophageal echo (MINA) with possible cardioversion [[id number]] Resulted: 06/27/23 1134    Order Status: Sent Lab Status: In process Updated: 06/27/23 1134            There are no hospital problems to display for this patient.    Assessment & plan notes cannot be loaded without a specified hospital service.      Discharged Condition: good    Disposition: Home or Self Care    Follow Up:    Patient Instructions:   No discharge procedures on file.  Medications:  Reconciled Home Medications:      Medication List        ASK your doctor about these medications      amiodarone 200 MG Tab  Commonly known as: PACERONE  Take 1 tablet (200 mg total) by mouth once daily.     amLODIPine 10 MG tablet  Commonly known as: NORVASC  10 mg.     apixaban 5 mg Tab  Commonly known as: ELIQUIS  5 mg.     atorvastatin 40 MG tablet  Commonly known as: LIPITOR  20 mg.     calcipotriene 0.005 % cream  Commonly known as: DOVONOX  APPLY MODERATE AMOUNT TOPICALLY TWICE A DAY FOR PLAQUE PSORIASIS     cholecalciferol (vitamin D3) 50 mcg (2,000 unit) Tab  Commonly known as: VITAMIN D3  50 mcg.     furosemide 20 MG tablet  Commonly  known as: LASIX  Take 1 tablet (20 mg total) by mouth once daily.     insulin glargine-yfgn 100 unit/mL Soln  INJECT 20 UNITS SUBCUTANEOUSLY AT BEDTIME FOR DIABETES     lisinopriL-hydrochlorothiazide 20-12.5 mg per tablet  Commonly known as: PRINZIDE,ZESTORETIC  Take 2 tablets by mouth once daily.     metFORMIN 1000 MG tablet  Commonly known as: GLUCOPHAGE  1,000 mg.     metoprolol succinate 25 MG 24 hr tablet  Commonly known as: TOPROL-XL  Take 1 tablet (25 mg total) by mouth once daily.     semaglutide 0.25 mg or 0.5 mg(2 mg/1.5 mL) pen injector  Commonly known as: OZEMPIC  INJECT SEMAGLUTIDE SUBCUTANEOUSLY AS DIRECTED FOR DIABETES 0.25MG UNDER THE SKIN INJECTION WEEKLY FOR FOUR WEEKS, THEN INCREASE TO 0.5MG INJECTION UNDER SKIN WEEKLY.              Time spent on the discharge of patient: 30 minutes    Santy Martinez MD  Cardiology  [facility] Cath Lab ([facility])

## 2023-06-27 NOTE — NURSING
VSS. Remains in NSR. Awake and alert and able to swallow water without problems. Ambulating to bathroom to urinate without problems.

## 2023-06-27 NOTE — ANESTHESIA PREPROCEDURE EVALUATION
06/27/2023  Roland Lopez is a 58 y.o., male.    Vent. Rate : 072 BPM     Atrial Rate : 000 BPM      P-R Int : 000 ms          QRS Dur : 098 ms       QT Int : 388 ms       P-R-T Axes : 000 -38 027 degrees      QTc Int : 424 ms     Atrial fibrillation   Left axis deviation   Incomplete right bundle branch block   Abnormal R wave progression in the precordial leads   When compared with ECG of 22-MAY-2023 16:05,   Vent. rate has decreased BY  53 BPM   Confirmed by HALIE FREDERICK MD ([id number]) on 6/15/2023 5:06:43 AM     Pre-op Assessment    I have reviewed the Patient Summary Reports.     I have reviewed the Nursing Notes. I have reviewed the NPO Status.   I have reviewed the Medications.     Review of Systems  Anesthesia Hx:  No problems with previous Anesthesia  Denies Family Hx of Anesthesia complications.   Denies Personal Hx of Anesthesia complications.   Social:  Non-Smoker, No Alcohol Use    Cardiovascular:   Hypertension, well controlled    Pulmonary:   Sleep Apnea, CPAP        Physical Exam  General: Well nourished, Cooperative, Alert and Oriented    Airway:  Mallampati: II   Mouth Opening: Normal  TM Distance: Normal  Tongue: Normal  Neck ROM: Normal ROM    Dental:  Periodontal disease        Anesthesia Plan  Type of Anesthesia, risks & benefits discussed:    Anesthesia Type: MAC  Intra-op Monitoring Plan: Standard ASA Monitors  Post Op Pain Control Plan: multimodal analgesia  Induction:  IV  ASA Score: 3    Ready For Surgery From Anesthesia Perspective.     .

## 2023-06-27 NOTE — ANESTHESIA POSTPROCEDURE EVALUATION
Anesthesia Post Evaluation    Patient: Roland Lopez    Procedure(s) Performed: Procedure(s) (LRB):  Transesophageal echo (SOFYA) intra-procedure log documentation (N/A)    Final Anesthesia Type: MAC      Patient location: [facility].  Patient participation: Yes- Able to Participate  Level of consciousness: awake and alert and oriented  Post-procedure vital signs: reviewed and not stable  Pain management: adequate  Airway patency: patent  EVELYN mitigation strategies: Preoperative initiation of continuous positive airway pressure (CPAP) or non-invasive positive pressure ventilation (NIPPV)  PONV status at discharge: No PONV  Anesthetic complications: no      Cardiovascular status: blood pressure returned to baseline, hemodynamically stable and stable  Respiratory status: unassisted and room air  Hydration status: euvolemic  Follow-up not needed.          Vitals Value Taken Time   /93 06/27/23 1226   Temp 36.6 °C (97.9 °F) 06/27/23 1029   Pulse 71 06/27/23 1029   Resp 14 06/27/23 1226   SpO2 97 % 06/27/23 1029         No case tracking events are documented in the log.      Pain/Gabby Score: No data recorded

## 2023-07-03 VITALS
RESPIRATION RATE: 18 BRPM | TEMPERATURE: 98 F | DIASTOLIC BLOOD PRESSURE: 82 MMHG | HEIGHT: 74 IN | WEIGHT: 315 LBS | SYSTOLIC BLOOD PRESSURE: 136 MMHG | BODY MASS INDEX: 40.43 KG/M2 | OXYGEN SATURATION: 98 % | HEART RATE: 62 BPM

## 2023-07-07 NOTE — ADDENDUM NOTE
Addendum  created 07/07/23 1110 by Jurgen Lopez CRNA    Intraprocedure Event edited, Intraprocedure Staff edited

## 2023-07-12 ENCOUNTER — HOSPITAL ENCOUNTER (OUTPATIENT)
Dept: CARDIOLOGY | Facility: HOSPITAL | Age: 58
Discharge: HOME | End: 2023-07-12
Attending: INTERNAL MEDICINE
Payer: OTHER GOVERNMENT

## 2023-07-12 ENCOUNTER — OFFICE VISIT (OUTPATIENT)
Dept: CARDIOLOGY | Facility: CLINIC | Age: 58
End: 2023-07-12
Payer: OTHER GOVERNMENT

## 2023-07-12 VITALS
HEART RATE: 76 BPM | DIASTOLIC BLOOD PRESSURE: 82 MMHG | WEIGHT: 315 LBS | OXYGEN SATURATION: 97 % | SYSTOLIC BLOOD PRESSURE: 128 MMHG | BODY MASS INDEX: 40.43 KG/M2 | HEIGHT: 74 IN

## 2023-07-12 DIAGNOSIS — Z79.4 TYPE 2 DIABETES MELLITUS WITHOUT COMPLICATION, WITH LONG-TERM CURRENT USE OF INSULIN: ICD-10-CM

## 2023-07-12 DIAGNOSIS — I10 HYPERTENSION, UNSPECIFIED TYPE: Primary | ICD-10-CM

## 2023-07-12 DIAGNOSIS — E11.9 TYPE 2 DIABETES MELLITUS WITHOUT COMPLICATION, WITH LONG-TERM CURRENT USE OF INSULIN: ICD-10-CM

## 2023-07-12 DIAGNOSIS — I48.0 PAROXYSMAL ATRIAL FIBRILLATION: ICD-10-CM

## 2023-07-12 DIAGNOSIS — E66.01 OBESITY, CLASS III, BMI 40-49.9 (MORBID OBESITY): ICD-10-CM

## 2023-07-12 DIAGNOSIS — I48.91 NEW ONSET ATRIAL FIBRILLATION: ICD-10-CM

## 2023-07-12 DIAGNOSIS — I48.91 ATRIAL FIBRILLATION STATUS POST CARDIOVERSION: ICD-10-CM

## 2023-07-12 PROCEDURE — 99999 PR PBB SHADOW E&M-EST. PATIENT-LVL IV: CPT | Mod: PBBFAC,,, | Performed by: INTERNAL MEDICINE

## 2023-07-12 PROCEDURE — 99214 OFFICE O/P EST MOD 30 MIN: CPT | Mod: S$PBB,,, | Performed by: INTERNAL MEDICINE

## 2023-07-12 PROCEDURE — 99999 PR PBB SHADOW E&M-EST. PATIENT-LVL IV: ICD-10-PCS | Mod: PBBFAC,,, | Performed by: INTERNAL MEDICINE

## 2023-07-12 PROCEDURE — 99214 OFFICE O/P EST MOD 30 MIN: CPT | Mod: PBBFAC | Performed by: INTERNAL MEDICINE

## 2023-07-12 PROCEDURE — 99214 PR OFFICE/OUTPT VISIT, EST, LEVL IV, 30-39 MIN: ICD-10-PCS | Mod: S$PBB,,, | Performed by: INTERNAL MEDICINE

## 2023-07-12 PROCEDURE — 93010 EKG 12-LEAD: ICD-10-PCS | Mod: ,,, | Performed by: INTERNAL MEDICINE

## 2023-07-12 PROCEDURE — 93010 ELECTROCARDIOGRAM REPORT: CPT | Mod: ,,, | Performed by: INTERNAL MEDICINE

## 2023-07-12 PROCEDURE — 93005 ELECTROCARDIOGRAM TRACING: CPT

## 2023-07-12 RX ORDER — METOPROLOL SUCCINATE 25 MG/1
25 TABLET, EXTENDED RELEASE ORAL DAILY
Qty: 90 TABLET | Refills: 3 | Status: SHIPPED | OUTPATIENT
Start: 2023-07-12 | End: 2024-07-11

## 2023-07-12 RX ORDER — LISINOPRIL AND HYDROCHLOROTHIAZIDE 12.5; 2 MG/1; MG/1
2 TABLET ORAL DAILY
Qty: 180 TABLET | Refills: 3 | Status: SHIPPED | OUTPATIENT
Start: 2023-07-12 | End: 2024-07-06

## 2023-07-12 RX ORDER — FUROSEMIDE 20 MG/1
20 TABLET ORAL DAILY
Qty: 90 TABLET | Refills: 3 | Status: SHIPPED | OUTPATIENT
Start: 2023-07-12 | End: 2024-07-11

## 2023-07-12 RX ORDER — ATORVASTATIN CALCIUM 40 MG/1
40 TABLET, FILM COATED ORAL DAILY
Qty: 90 TABLET | Refills: 3 | Status: SHIPPED | OUTPATIENT
Start: 2023-07-12 | End: 2024-07-06

## 2023-07-12 NOTE — PROGRESS NOTES
Subjective:   Patient ID:  Roland Lopez is a 58 y.o. male who presents for evaluation of No chief complaint on file.      HPI  7.12.2023  Comes in for follow-up after his SOFYA cardioversion.  Done 2 weeks ago.    Put on metoprolol and amiodarone.    Continues to be in sinus rhythm today.    He states that he cut the grass today.  He did plenty of exercise and work without any chest pain.    He states that for some reason he is not been receiving his Ozempic refills.    Compliant with CPAP.        6.14.2023  58-year-old male, morbid obesity, obstructive sleep apnea on CPAP.    Smoked only a few times in his teens.  .    Was recently in the emergency room on May 22nd with fatigue found to have AFib with RVR.    Started on Eliquis and metoprolol by the emergency room Lasix.    He reports mild improvement symptoms.  [facility]  Has not had any further workup.    Denies any excessive caffeine.  No alcohol.    Denies any chest pain  Past Medical History:   Diagnosis Date    Hypertension     EVELYN (obstructive sleep apnea)        Past Surgical History:   Procedure Laterality Date    TRANSESOPHAGEAL ECHOCARDIOGRAM WITH POSSIBLE CARDIOVERSION (SOFYA W/ POSS CARDIOVERSION) N/A 6/27/2023    Procedure: Transesophageal echo (SOFYA) intra-procedure log documentation;  Surgeon: Santy Martinez MD;  Location: [facility];  Service: Cardiology;  Laterality: N/A;       Social History     Tobacco Use    Smoking status: Never    Smokeless tobacco: Never   Substance Use Topics    Alcohol use: Not Currently     Alcohol/week: 1.0 standard drink     Types: 1 Glasses of wine per week    Drug use: Never       History reviewed. No pertinent family history.    Review of Systems   Constitutional: Positive for malaise/fatigue.   Cardiovascular:  Positive for dyspnea on exertion and palpitations. Negative for chest pain and syncope.   Genitourinary: Negative.    Neurological: Negative.      Current Outpatient Medications on File  Prior to Visit   Medication Sig    amiodarone (PACERONE) 200 MG Tab Take 1 tablet (200 mg total) by mouth once daily.    amLODIPine (NORVASC) 10 MG tablet 10 mg.    calcipotriene (DOVONOX) 0.005 % cream APPLY MODERATE AMOUNT TOPICALLY TWICE A DAY FOR PLAQUE PSORIASIS    cholecalciferol, vitamin D3, (VITAMIN D3) 50 mcg (2,000 unit) Tab 50 mcg.    insulin glargine-yfgn 100 unit/mL Soln INJECT 20 UNITS SUBCUTANEOUSLY AT BEDTIME FOR DIABETES    metFORMIN (GLUCOPHAGE) 1000 MG tablet 1,000 mg.    semaglutide (OZEMPIC) 0.25 mg or 0.5 mg(2 mg/1.5 mL) pen injector INJECT SEMAGLUTIDE SUBCUTANEOUSLY AS DIRECTED FOR DIABETES 0.25MG UNDER THE SKIN INJECTION WEEKLY FOR FOUR WEEKS, THEN INCREASE TO 0.5MG INJECTION UNDER SKIN WEEKLY.    [DISCONTINUED] apixaban (ELIQUIS) 5 mg Tab 5 mg.    [DISCONTINUED] atorvastatin (LIPITOR) 40 MG tablet 20 mg.    [DISCONTINUED] furosemide (LASIX) 20 MG tablet Take 1 tablet (20 mg total) by mouth once daily.    [DISCONTINUED] lisinopriL-hydrochlorothiazide (PRINZIDE,ZESTORETIC) 20-12.5 mg per tablet Take 2 tablets by mouth once daily.    [DISCONTINUED] metoprolol succinate (TOPROL-XL) 25 MG 24 hr tablet Take 1 tablet (25 mg total) by mouth once daily.     No current facility-administered medications on file prior to visit.       Objective:   Objective:  Wt Readings from Last 3 Encounters:   07/12/23 (!) 157.8 kg (347 lb 14.2 oz)   06/27/23 (!) 150.6 kg (332 lb)   06/14/23 (!) 160.7 kg (354 lb 4.5 oz)     Temp Readings from Last 3 Encounters:   06/27/23 98 °F (36.7 °C) (Temporal)   05/22/23 98.9 °F (37.2 °C)   06/01/21 98.6 °F (37 °C) (Oral)     BP Readings from Last 3 Encounters:   07/12/23 128/82   06/27/23 136/82   06/14/23 132/86     Pulse Readings from Last 3 Encounters:   07/12/23 76   06/27/23 62   06/14/23 83       Physical Exam  Vitals reviewed.   Constitutional:       Appearance: He is well-developed.   Neck:      Vascular: No carotid bruit.   Cardiovascular:      Rate and Rhythm: Normal  rate. Rhythm irregular.      Pulses: Intact distal pulses.      Heart sounds: Normal heart sounds. No murmur heard.  Pulmonary:      Breath sounds: Normal breath sounds.   Neurological:      Mental Status: He is oriented to person, place, and time.       No results found for: CHOL  No results found for: HDL  No results found for: LDLCALC  No results found for: TRIG  No results found for: CHOLHDL    Chemistry        Component Value Date/Time     06/27/2023 1046    K 3.9 06/27/2023 1046     06/27/2023 1046    CO2 26 06/27/2023 1046    BUN 11 06/27/2023 1046    CREATININE 0.7 06/27/2023 1046     (H) 06/27/2023 1046        Component Value Date/Time    CALCIUM 9.1 06/27/2023 1046    ALKPHOS 68 05/22/2023 1740    AST 22 05/22/2023 1740    ALT 26 05/22/2023 1740    BILITOT 1.3 (H) 05/22/2023 1740    ESTGFRAFRICA >60 10/30/2016 1012    EGFRNONAA >60 10/30/2016 1012          Lab Results   Component Value Date    TSH 3.541 05/22/2023     Lab Results   Component Value Date    INR 1.1 06/27/2023    INR 1.1 10/30/2016     Lab Results   Component Value Date    WBC 6.12 06/27/2023    HGB 13.1 (L) 06/27/2023    HCT 40.4 06/27/2023    MCV 84 06/27/2023     06/27/2023     BNP  @LABRCNTIP(BNP,BNPTRIAGEBLO)@  CrCl cannot be calculated (Patient's most recent lab result is older than the maximum 7 days allowed.).     Imaging:  ======  No results found for this or any previous visit.    No results found for this or any previous visit.    No results found for this or any previous visit.    Results for orders placed during the hospital encounter of 05/22/23    X-Ray Chest PA And Lateral    Narrative  EXAMINATION:  XR CHEST PA AND LATERAL    CLINICAL HISTORY:  shortness of breath;    TECHNIQUE:  PA and lateral views of the chest were performed.    COMPARISON:  None    FINDINGS:  Cardiomegaly with mild basilar atelectasis.  Flattening of the diaphragms.  Mild central pulmonary vascular crowding.  Element of CHF not  excluded.    Impression  As above      Electronically signed by: Brock Leonor  Date:    05/22/2023  Time:    17:25    No results found for this or any previous visit.    No valid procedures specified.    Diagnostic Results:  ECG: Reviewed  AFib  The ASCVD Risk score (Jn DK, et al., 2019) failed to calculate for the following reasons:    Cannot find a previous HDL lab    Cannot find a previous total cholesterol lab    Assessment and Plan:   Hypertension, unspecified type  -     furosemide (LASIX) 20 MG tablet; Take 1 tablet (20 mg total) by mouth once daily.  Dispense: 90 tablet; Refill: 3  -     apixaban (ELIQUIS) 5 mg Tab; Take 1 tablet (5 mg total) by mouth 2 (two) times daily.  Dispense: 180 tablet; Refill: 3  -     atorvastatin (LIPITOR) 40 MG tablet; Take 1 tablet (40 mg total) by mouth once daily.  Dispense: 90 tablet; Refill: 3  -     metoprolol succinate (TOPROL-XL) 25 MG 24 hr tablet; Take 1 tablet (25 mg total) by mouth once daily.  Dispense: 90 tablet; Refill: 3  -     lisinopriL-hydrochlorothiazide (PRINZIDE,ZESTORETIC) 20-12.5 mg per tablet; Take 2 tablets by mouth once daily.  Dispense: 180 tablet; Refill: 3    New onset atrial fibrillation  -     furosemide (LASIX) 20 MG tablet; Take 1 tablet (20 mg total) by mouth once daily.  Dispense: 90 tablet; Refill: 3  -     apixaban (ELIQUIS) 5 mg Tab; Take 1 tablet (5 mg total) by mouth 2 (two) times daily.  Dispense: 180 tablet; Refill: 3  -     atorvastatin (LIPITOR) 40 MG tablet; Take 1 tablet (40 mg total) by mouth once daily.  Dispense: 90 tablet; Refill: 3  -     metoprolol succinate (TOPROL-XL) 25 MG 24 hr tablet; Take 1 tablet (25 mg total) by mouth once daily.  Dispense: 90 tablet; Refill: 3  -     lisinopriL-hydrochlorothiazide (PRINZIDE,ZESTORETIC) 20-12.5 mg per tablet; Take 2 tablets by mouth once daily.  Dispense: 180 tablet; Refill: 3    Obesity, Class III, BMI 40-49.9 (morbid obesity)    Type 2 diabetes mellitus without complication, with  long-term current use of insulin        Continue with Eliquis, Toprol and Lasix.    Continue with amiodarone for now.  Reviewed all tests and above medical conditions with patient in detail and formulated treatment plan.  Risk factor modification discussed.   Cardiac low salt diet discussed.  Maintaining healthy weight and weight loss goals were discussed in clinic.    Follow up six-month

## 2024-02-26 ENCOUNTER — TELEPHONE (OUTPATIENT)
Dept: CARDIOLOGY | Facility: CLINIC | Age: 59
End: 2024-02-26
Payer: OTHER GOVERNMENT

## 2024-02-26 NOTE — TELEPHONE ENCOUNTER
Spoke with pt in regards to rescheduling appt due to his dad being in the hosp.                   ----- Message from Sandi Jalloh sent at 2/26/2024  9:40 AM CST -----  Pt called in about wanting to reschedule appt. Pt would like to get a Tuesday appt. Pt would like to come before April 2024. Pt would like the office to give him a call back.             Pt can be reached at  [phone]            TY

## 2024-04-23 ENCOUNTER — HOSPITAL ENCOUNTER (EMERGENCY)
Facility: HOSPITAL | Age: 59
Discharge: HOME | End: 2024-04-23
Attending: EMERGENCY MEDICINE
Payer: OTHER GOVERNMENT

## 2024-04-23 VITALS
TEMPERATURE: 98 F | SYSTOLIC BLOOD PRESSURE: 128 MMHG | HEIGHT: 74 IN | DIASTOLIC BLOOD PRESSURE: 78 MMHG | WEIGHT: 315 LBS | BODY MASS INDEX: 40.43 KG/M2 | HEART RATE: 63 BPM | OXYGEN SATURATION: 95 % | RESPIRATION RATE: 18 BRPM

## 2024-04-23 DIAGNOSIS — I10 HYPERTENSION: Primary | ICD-10-CM

## 2024-04-23 LAB
ALBUMIN SERPL BCP-MCNC: 3.5 G/DL (ref 3.5–5.2)
ALP SERPL-CCNC: 65 U/L (ref 55–135)
ALT SERPL W/O P-5'-P-CCNC: 25 U/L (ref 10–44)
ANION GAP SERPL CALC-SCNC: 9 MMOL/L (ref 8–16)
AST SERPL-CCNC: 17 U/L (ref 10–40)
BASOPHILS # BLD AUTO: 0.03 K/UL (ref 0–0.2)
BASOPHILS NFR BLD: 0.6 % (ref 0–1.9)
BILIRUB SERPL-MCNC: 0.7 MG/DL (ref 0.1–1)
BILIRUB UR QL STRIP: NEGATIVE
BNP SERPL-MCNC: 20 PG/ML (ref 0–99)
BUN SERPL-MCNC: 11 MG/DL (ref 6–20)
CALCIUM SERPL-MCNC: 9.1 MG/DL (ref 8.7–10.5)
CHLORIDE SERPL-SCNC: 104 MMOL/L (ref 95–110)
CK SERPL-CCNC: 89 U/L (ref 20–200)
CLARITY UR: CLEAR
CO2 SERPL-SCNC: 25 MMOL/L (ref 23–29)
COLOR UR: YELLOW
CREAT SERPL-MCNC: 0.8 MG/DL (ref 0.5–1.4)
DIFFERENTIAL METHOD BLD: ABNORMAL
EOSINOPHIL # BLD AUTO: 0.1 K/UL (ref 0–0.5)
EOSINOPHIL NFR BLD: 1.7 % (ref 0–8)
ERYTHROCYTE [DISTWIDTH] IN BLOOD BY AUTOMATED COUNT: 13.2 % (ref 11.5–14.5)
EST. GFR  (NO RACE VARIABLE): >60 ML/MIN/1.73 M^2
GLUCOSE SERPL-MCNC: 231 MG/DL (ref 70–110)
GLUCOSE UR QL STRIP: ABNORMAL
HCT VFR BLD AUTO: 41 % (ref 40–54)
HGB BLD-MCNC: 13.7 G/DL (ref 14–18)
HGB UR QL STRIP: NEGATIVE
IMM GRANULOCYTES # BLD AUTO: 0.01 K/UL (ref 0–0.04)
IMM GRANULOCYTES NFR BLD AUTO: 0.2 % (ref 0–0.5)
KETONES UR QL STRIP: NEGATIVE
LEUKOCYTE ESTERASE UR QL STRIP: NEGATIVE
LYMPHOCYTES # BLD AUTO: 1.2 K/UL (ref 1–4.8)
LYMPHOCYTES NFR BLD: 23.6 % (ref 18–48)
MCH RBC QN AUTO: 27.6 PG (ref 27–31)
MCHC RBC AUTO-ENTMCNC: 33.4 G/DL (ref 32–36)
MCV RBC AUTO: 83 FL (ref 82–98)
MONOCYTES # BLD AUTO: 0.4 K/UL (ref 0.3–1)
MONOCYTES NFR BLD: 8.4 % (ref 4–15)
NEUTROPHILS # BLD AUTO: 3.4 K/UL (ref 1.8–7.7)
NEUTROPHILS NFR BLD: 65.5 % (ref 38–73)
NITRITE UR QL STRIP: NEGATIVE
NRBC BLD-RTO: 0 /100 WBC
OHS QRS DURATION: 110 MS
OHS QTC CALCULATION: 438 MS
PH UR STRIP: 6 [PH] (ref 5–8)
PLATELET # BLD AUTO: 169 K/UL (ref 150–450)
PMV BLD AUTO: 9.8 FL (ref 9.2–12.9)
POTASSIUM SERPL-SCNC: 3.8 MMOL/L (ref 3.5–5.1)
PROT SERPL-MCNC: 7.1 G/DL (ref 6–8.4)
PROT UR QL STRIP: NEGATIVE
RBC # BLD AUTO: 4.97 M/UL (ref 4.6–6.2)
SODIUM SERPL-SCNC: 138 MMOL/L (ref 136–145)
SP GR UR STRIP: 1.02 (ref 1–1.03)
TROPONIN I SERPL DL<=0.01 NG/ML-MCNC: <0.006 NG/ML (ref 0–0.03)
URN SPEC COLLECT METH UR: ABNORMAL
UROBILINOGEN UR STRIP-ACNC: NEGATIVE EU/DL
WBC # BLD AUTO: 5.25 K/UL (ref 3.9–12.7)

## 2024-04-23 PROCEDURE — 81003 URINALYSIS AUTO W/O SCOPE: CPT | Performed by: EMERGENCY MEDICINE

## 2024-04-23 PROCEDURE — 82550 ASSAY OF CK (CPK): CPT | Performed by: EMERGENCY MEDICINE

## 2024-04-23 PROCEDURE — 85025 COMPLETE CBC W/AUTO DIFF WBC: CPT | Performed by: EMERGENCY MEDICINE

## 2024-04-23 PROCEDURE — 84484 ASSAY OF TROPONIN QUANT: CPT | Performed by: EMERGENCY MEDICINE

## 2024-04-23 PROCEDURE — 83880 ASSAY OF NATRIURETIC PEPTIDE: CPT | Performed by: EMERGENCY MEDICINE

## 2024-04-23 PROCEDURE — 93005 ELECTROCARDIOGRAM TRACING: CPT

## 2024-04-23 PROCEDURE — 93010 ELECTROCARDIOGRAM REPORT: CPT | Mod: ,,, | Performed by: INTERNAL MEDICINE

## 2024-04-23 PROCEDURE — 80053 COMPREHEN METABOLIC PANEL: CPT | Performed by: EMERGENCY MEDICINE

## 2024-04-23 PROCEDURE — 99285 EMERGENCY DEPT VISIT HI MDM: CPT | Mod: 25

## 2024-04-23 NOTE — ED PROVIDER NOTES
Encounter Date: 4/23/2024       History     Chief Complaint   Patient presents with    Hypertension     Pt states he had elevated Bp readings this AM (200s/100s) pt denies any symptoms      The history is provided by the patient.   Hypertension   This is a recurrent problem. The current episode started two days ago. The problem has been unchanged. Associated symptoms include anxiety. Pertinent negatives include no chest pain, no confusion, no headaches, no tinnitus, no peripheral edema, no dizziness and no shortness of breath. There are no associated agents to hypertension.     Review of patient's allergies indicates:   Allergen Reactions    Sulfa (sulfonamide antibiotics) Rash     Past Medical History:   Diagnosis Date    Hypertension     EVELYN (obstructive sleep apnea)      Past Surgical History:   Procedure Laterality Date    TRANSESOPHAGEAL ECHOCARDIOGRAM WITH POSSIBLE CARDIOVERSION (SOFYA W/ POSS CARDIOVERSION) N/A 6/27/2023    Procedure: Transesophageal echo (SOFYA) intra-procedure log documentation;  Surgeon: Santy Martinez MD;  Location: [facility] CATH [facility];  Service: Cardiology;  Laterality: N/A;     No family history on file.  Social History     Tobacco Use    Smoking status: Never    Smokeless tobacco: Never   Substance Use Topics    Alcohol use: Not Currently     Alcohol/week: 1.0 standard drink of alcohol     Types: 1 Glasses of wine per week    Drug use: Never     Review of Systems   Constitutional:  Negative for fever.   HENT:  Negative for sore throat and tinnitus.    Respiratory:  Negative for shortness of breath.    Cardiovascular:  Negative for chest pain.   Gastrointestinal:  Negative for nausea.   Genitourinary:  Negative for dysuria.   Musculoskeletal:  Negative for back pain.   Skin:  Negative for rash.   Neurological:  Negative for dizziness, weakness and headaches.   Hematological:  Does not bruise/bleed easily.   Psychiatric/Behavioral:  Negative for confusion.        Physical Exam     Initial  Vitals [04/23/24 0913]   BP Pulse Resp Temp SpO2   (!) 140/84 67 17 97.9 °F (36.6 °C) 97 %      MAP       --         Physical Exam    Nursing note and vitals reviewed.  Constitutional: He appears well-developed and well-nourished. No distress.   HENT:   Head: Normocephalic and atraumatic.   Mouth/Throat: Oropharynx is clear and moist.   Eyes: Conjunctivae and EOM are normal. Pupils are equal, round, and reactive to light.   Neck: Neck supple.   Normal range of motion.  Cardiovascular:  Normal rate, regular rhythm and normal heart sounds.     Exam reveals no gallop and no friction rub.       No murmur heard.  Pulmonary/Chest: Breath sounds normal. No respiratory distress. He has no wheezes. He has no rhonchi. He has no rales.   Abdominal: Abdomen is soft. Bowel sounds are normal. He exhibits no distension and no mass. There is no abdominal tenderness. There is no rebound and no guarding.   Musculoskeletal:         General: No edema. Normal range of motion.      Cervical back: Normal range of motion and neck supple.     Neurological: He is alert and oriented to person, place, and time. He has normal strength.   Skin: Skin is warm and dry. No rash noted.   Psychiatric: He has a normal mood and affect. Thought content normal.         ED Course   Procedures  Labs Reviewed   CBC W/ AUTO DIFFERENTIAL - Abnormal; Notable for the following components:       Result Value    Hemoglobin 13.7 (*)     All other components within normal limits   COMPREHENSIVE METABOLIC PANEL - Abnormal; Notable for the following components:    Glucose 231 (*)     All other components within normal limits   URINALYSIS, REFLEX TO URINE CULTURE - Abnormal; Notable for the following components:    Glucose, UA 1+ (*)     All other components within normal limits    Narrative:     Specimen Source->Urine   B-TYPE NATRIURETIC PEPTIDE   CK   TROPONIN I     EKG Readings: (Independently Interpreted)   Rhythm: Normal Sinus Rhythm. Heart Rate: 61. Ectopy: No  Ectopy. Conduction: Normal. ST Segments: Normal ST Segments. T Waves: Normal. Clinical Impression: Normal Sinus Rhythm       Imaging Results              X-Ray Chest AP Portable (Final result)  Result time 04/23/24 10:19:02      Final result by Geronimo Diego MD (04/23/24 10:19:02)                   Impression:      No acute abnormality.      Electronically signed by: Geronimo Diego  Date:    04/23/2024  Time:    10:19               Narrative:    EXAMINATION:  XR CHEST AP PORTABLE    CLINICAL HISTORY:  htn;    TECHNIQUE:  Single frontal view of the chest was performed.    COMPARISON:  Multiple priors.    FINDINGS:  The lungs are clear, with normal appearance of pulmonary vasculature and no pleural effusion or pneumothorax.    The cardiac silhouette is normal in size. The hilar and mediastinal contours are unremarkable.    Bones are intact.                                       Medications - No data to display  Medical Decision Making  Takes his BP at home every morning, and for the last few days, has had systolics in the 200s.  Denies chest pain or sob.   DDx: HTN, stress    Amount and/or Complexity of Data Reviewed  Labs: ordered.     Details: No acute findings except for glucose of 231  Radiology: ordered.    Risk  OTC drugs.                                      Clinical Impression:  Final diagnoses:  [I10] Hypertension (Primary)          ED Disposition Condition    Discharge Stable          ED Prescriptions    None       Follow-up Information       Follow up With Specialties Details Why Contact Info Additional Information    [facility] Internal [address] Internal Medicine Schedule an appointment as soon as possible for a visit   [id number] [facility] [id number] [phone] Please park on the [address] side and use the Clinic entrance. Check in on the 2nd floor, to the right.             Pratik Nova MD  04/23/24 3972

## 2024-07-02 ENCOUNTER — HOSPITAL ENCOUNTER (OUTPATIENT)
Dept: CARDIOLOGY | Facility: HOSPITAL | Age: 59
Discharge: HOME | End: 2024-07-02
Attending: INTERNAL MEDICINE
Payer: OTHER GOVERNMENT

## 2024-07-02 ENCOUNTER — OFFICE VISIT (OUTPATIENT)
Dept: CARDIOLOGY | Facility: CLINIC | Age: 59
End: 2024-07-02
Payer: OTHER GOVERNMENT

## 2024-07-02 VITALS
DIASTOLIC BLOOD PRESSURE: 78 MMHG | HEIGHT: 74 IN | SYSTOLIC BLOOD PRESSURE: 168 MMHG | WEIGHT: 315 LBS | HEART RATE: 71 BPM | BODY MASS INDEX: 40.43 KG/M2 | OXYGEN SATURATION: 99 %

## 2024-07-02 DIAGNOSIS — I48.0 PAROXYSMAL ATRIAL FIBRILLATION: Primary | ICD-10-CM

## 2024-07-02 DIAGNOSIS — I10 HYPERTENSION, UNSPECIFIED TYPE: ICD-10-CM

## 2024-07-02 DIAGNOSIS — E66.01 OBESITY, CLASS III, BMI 40-49.9 (MORBID OBESITY): ICD-10-CM

## 2024-07-02 DIAGNOSIS — I48.0 PAROXYSMAL ATRIAL FIBRILLATION: ICD-10-CM

## 2024-07-02 DIAGNOSIS — E11.9 TYPE 2 DIABETES MELLITUS WITHOUT COMPLICATION, WITH LONG-TERM CURRENT USE OF INSULIN: ICD-10-CM

## 2024-07-02 DIAGNOSIS — G47.33 OBSTRUCTIVE SLEEP APNEA: ICD-10-CM

## 2024-07-02 DIAGNOSIS — I48.91 ATRIAL FIBRILLATION STATUS POST CARDIOVERSION: ICD-10-CM

## 2024-07-02 DIAGNOSIS — Z79.4 TYPE 2 DIABETES MELLITUS WITHOUT COMPLICATION, WITH LONG-TERM CURRENT USE OF INSULIN: ICD-10-CM

## 2024-07-02 LAB
OHS QRS DURATION: 98 MS
OHS QTC CALCULATION: 438 MS

## 2024-07-02 PROCEDURE — 93010 ELECTROCARDIOGRAM REPORT: CPT | Mod: ,,, | Performed by: INTERNAL MEDICINE

## 2024-07-02 PROCEDURE — 99214 OFFICE O/P EST MOD 30 MIN: CPT | Mod: S$PBB,,, | Performed by: INTERNAL MEDICINE

## 2024-07-02 PROCEDURE — 93005 ELECTROCARDIOGRAM TRACING: CPT

## 2024-07-02 PROCEDURE — 99214 OFFICE O/P EST MOD 30 MIN: CPT | Mod: PBBFAC,25 | Performed by: INTERNAL MEDICINE

## 2024-07-02 PROCEDURE — 99999 PR PBB SHADOW E&M-EST. PATIENT-LVL IV: CPT | Mod: PBBFAC,,, | Performed by: INTERNAL MEDICINE

## 2024-07-02 RX ORDER — FLECAINIDE ACETATE 50 MG/1
50 TABLET ORAL EVERY 12 HOURS
Qty: 60 TABLET | Refills: 11 | Status: SHIPPED | OUTPATIENT
Start: 2024-07-02 | End: 2025-07-02

## 2024-07-02 NOTE — PROGRESS NOTES
Subjective:   Patient ID:  Roland Lopez is a 59 y.o. male who presents for evaluation of No chief complaint on file.      HPI  JULY 2, 2024.    Comes in for a 1 year follow-up   Still in sinus rhythm on EKG today.    Denies palpitations.    Compliant with CPAP.    States sometimes can get breathless cutting his yd on it is hot outside.    However denies any chest pain or tightness.      7.12.2023  Comes in for follow-up after his SOFYA cardioversion.  Done 2 weeks ago.    Put on metoprolol and amiodarone.    Continues to be in sinus rhythm today.    He states that he cut the grass today.  He did plenty of exercise and work without any chest pain.    He states that for some reason he is not been receiving his Ozempic refills.    Compliant with CPAP.        6.14.2023  58-year-old male, morbid obesity, obstructive sleep apnea on CPAP.    Smoked only a few times in his teens.  .    Was recently in the emergency room on May 22nd with fatigue found to have AFib with RVR.    Started on Eliquis and metoprolol by the emergency room Lasix.    He reports mild improvement symptoms.  [facility]  Has not had any further workup.    Denies any excessive caffeine.  No alcohol.    Denies any chest pain  Past Medical History:   Diagnosis Date    Hypertension     EVELYN (obstructive sleep apnea)        Past Surgical History:   Procedure Laterality Date    TRANSESOPHAGEAL ECHOCARDIOGRAM WITH POSSIBLE CARDIOVERSION (SOFYA W/ POSS CARDIOVERSION) N/A 6/27/2023    Procedure: Transesophageal echo (SOFYA) intra-procedure log documentation;  Surgeon: Santy Martinez MD;  Location: [facility] CATH LAB;  Service: Cardiology;  Laterality: N/A;       Social History     Tobacco Use    Smoking status: Never    Smokeless tobacco: Never   Substance Use Topics    Alcohol use: Not Currently     Alcohol/week: 1.0 standard drink of alcohol     Types: 1 Glasses of wine per week    Drug use: Never       No family history on file.    Review of Systems    Constitutional: Negative for malaise/fatigue.   Cardiovascular:  Positive for dyspnea on exertion. Negative for chest pain, palpitations and syncope.   Genitourinary: Negative.    Neurological: Negative.        Current Outpatient Medications on File Prior to Visit   Medication Sig    amiodarone (PACERONE) 200 MG Tab Take 1 tablet (200 mg total) by mouth once daily.    amLODIPine (NORVASC) 10 MG tablet 10 mg.    apixaban (ELIQUIS) 5 mg Tab Take 1 tablet (5 mg total) by mouth 2 (two) times daily.    atorvastatin (LIPITOR) 40 MG tablet Take 1 tablet (40 mg total) by mouth once daily.    calcipotriene (DOVONOX) 0.005 % cream APPLY MODERATE AMOUNT TOPICALLY TWICE A DAY FOR PLAQUE PSORIASIS    cholecalciferol, vitamin D3, (VITAMIN D3) 50 mcg (2,000 unit) Tab 50 mcg.    furosemide (LASIX) 20 MG tablet Take 1 tablet (20 mg total) by mouth once daily.    insulin glargine-yfgn 100 unit/mL Soln INJECT 20 UNITS SUBCUTANEOUSLY AT BEDTIME FOR DIABETES    lisinopriL-hydrochlorothiazide (PRINZIDE,ZESTORETIC) 20-12.5 mg per tablet Take 2 tablets by mouth once daily.    metFORMIN (GLUCOPHAGE) 1000 MG tablet 1,000 mg.    metoprolol succinate (TOPROL-XL) 25 MG 24 hr tablet Take 1 tablet (25 mg total) by mouth once daily.    semaglutide (OZEMPIC) 0.25 mg or 0.5 mg(2 mg/1.5 mL) pen injector INJECT SEMAGLUTIDE SUBCUTANEOUSLY AS DIRECTED FOR DIABETES 0.25MG UNDER THE SKIN INJECTION WEEKLY FOR FOUR WEEKS, THEN INCREASE TO 0.5MG INJECTION UNDER SKIN WEEKLY.     No current facility-administered medications on file prior to visit.       Objective:   Objective:  Wt Readings from Last 3 Encounters:   07/02/24 (!) 157.7 kg (347 lb 10.7 oz)   04/23/24 (!) 155.4 kg (342 lb 9.5 oz)   07/12/23 (!) 157.8 kg (347 lb 14.2 oz)     Temp Readings from Last 3 Encounters:   04/23/24 97.9 °F (36.6 °C) (Oral)   06/27/23 98 °F (36.7 °C) (Temporal)   05/22/23 98.9 °F (37.2 °C)     BP Readings from Last 3 Encounters:   07/02/24 (!) 168/78   04/23/24 128/78  "  07/12/23 128/82     Pulse Readings from Last 3 Encounters:   07/02/24 71   04/23/24 63   07/12/23 76       Physical Exam  Vitals reviewed.   Constitutional:       Appearance: He is well-developed.   Neck:      Vascular: No carotid bruit.   Cardiovascular:      Rate and Rhythm: Normal rate. Rhythm irregular.      Pulses: Intact distal pulses.      Heart sounds: Normal heart sounds. No murmur heard.  Pulmonary:      Breath sounds: Normal breath sounds.   Neurological:      Mental Status: He is oriented to person, place, and time.         No results found for: "CHOL"  No results found for: "HDL"  No results found for: "LDLCALC"  No results found for: "TRIG"  No results found for: "CHOLHDL"    Chemistry        Component Value Date/Time     04/23/2024 0936    K 3.8 04/23/2024 0936     04/23/2024 0936    CO2 25 04/23/2024 0936    BUN 11 04/23/2024 0936    CREATININE 0.8 04/23/2024 0936     (H) 04/23/2024 0936        Component Value Date/Time    CALCIUM 9.1 04/23/2024 0936    ALKPHOS 65 04/23/2024 0936    AST 17 04/23/2024 0936    ALT 25 04/23/2024 0936    BILITOT 0.7 04/23/2024 0936    ESTGFRAFRICA >60 10/30/2016 1012    EGFRNONAA >60 10/30/2016 1012          Lab Results   Component Value Date    TSH 3.541 05/22/2023     Lab Results   Component Value Date    INR 1.1 06/27/2023    INR 1.1 10/30/2016     Lab Results   Component Value Date    WBC 5.25 04/23/2024    HGB 13.7 (L) 04/23/2024    HCT 41.0 04/23/2024    MCV 83 04/23/2024     04/23/2024     BNP  @LABRCNTIP(BNP,BNPTRIAGEBLO)@  CrCl cannot be calculated (Patient's most recent lab result is older than the maximum 7 days allowed.).     Imaging:  ======  No results found for this or any previous visit.    No results found for this or any previous visit.    No results found for this or any previous visit.    Results for orders placed during the hospital encounter of 05/22/23    X-Ray Chest PA And Lateral    Narrative  EXAMINATION:  XR CHEST PA " AND LATERAL    CLINICAL HISTORY:  shortness of breath;    TECHNIQUE:  PA and lateral views of the chest were performed.    COMPARISON:  None    FINDINGS:  Cardiomegaly with mild basilar atelectasis.  Flattening of the diaphragms.  Mild central pulmonary vascular crowding.  Element of CHF not excluded.    Impression  As above      Electronically signed by: Brock Leonor  Date:    05/22/2023  Time:    17:25    No results found for this or any previous visit.    No valid procedures specified.    Diagnostic Results:  ECG: Reviewed  AFib  The ASCVD Risk score (Jn DK, et al., 2019) failed to calculate for the following reasons:    Cannot find a previous HDL lab    Cannot find a previous total cholesterol lab    Assessment and Plan:   Paroxysmal atrial fibrillation  -     Exercise Stress - EKG; Future  -     flecainide (TAMBOCOR) 50 MG Tab; Take 1 tablet (50 mg total) by mouth every 12 (twelve) hours.  Dispense: 60 tablet; Refill: 11    Type 2 diabetes mellitus without complication, with long-term current use of insulin    Obesity, Class III, BMI 40-49.9 (morbid obesity)    Obstructive sleep apnea    Hypertension, unspecified type          Continue with Eliquis, Toprol and Lasix.    BP elevated today.  Has been off his lisinopril HCTZ for the past 4 days.  Waiting for refill  We will change from amiodarone to flecainide to avoid long-term use related toxicity.  We will allow short medication vacation in between patient made aware.  Reviewed all tests and above medical conditions with patient in detail and formulated treatment plan.  Risk factor modification discussed.   Cardiac low salt diet discussed.  Maintaining healthy weight and weight loss goals were discussed in clinic.    Follow up six-month

## 2024-07-09 ENCOUNTER — HOSPITAL ENCOUNTER (OUTPATIENT)
Dept: CARDIOLOGY | Facility: HOSPITAL | Age: 59
Discharge: HOME | End: 2024-07-09
Attending: INTERNAL MEDICINE
Payer: OTHER GOVERNMENT

## 2024-07-09 DIAGNOSIS — I48.0 PAROXYSMAL ATRIAL FIBRILLATION: ICD-10-CM

## 2024-07-09 PROCEDURE — 93017 CV STRESS TEST TRACING ONLY: CPT

## 2024-07-09 PROCEDURE — 93018 CV STRESS TEST I&R ONLY: CPT | Mod: ,,, | Performed by: STUDENT IN AN ORGANIZED HEALTH CARE EDUCATION/TRAINING PROGRAM

## 2024-07-09 PROCEDURE — 93016 CV STRESS TEST SUPVJ ONLY: CPT | Mod: ,,, | Performed by: STUDENT IN AN ORGANIZED HEALTH CARE EDUCATION/TRAINING PROGRAM

## 2024-07-10 LAB
CV STRESS BASE HR: 87 BPM
DIASTOLIC BLOOD PRESSURE: 84 MMHG
OHS CV CPX 1 MINUTE RECOVERY HEART RATE: 134 BPM
OHS CV CPX 85 PERCENT MAX PREDICTED HEART RATE MALE: 138
OHS CV CPX ESTIMATED METS: 7
OHS CV CPX MAX PREDICTED HEART RATE: 162
OHS CV CPX PATIENT IS FEMALE: 0
OHS CV CPX PATIENT IS MALE: 1
OHS CV CPX PEAK DIASTOLIC BLOOD PRESSURE: 95 MMHG
OHS CV CPX PEAK HEAR RATE: 141 BPM
OHS CV CPX PEAK RATE PRESSURE PRODUCT: NORMAL
OHS CV CPX PEAK SYSTOLIC BLOOD PRESSURE: 241 MMHG
OHS CV CPX PERCENT MAX PREDICTED HEART RATE ACHIEVED: 87
OHS CV CPX RATE PRESSURE PRODUCT PRESENTING: NORMAL
STRESS ECHO POST EXERCISE DUR MIN: 4 MINUTES
STRESS ECHO POST EXERCISE DUR SEC: 26 SECONDS
SYSTOLIC BLOOD PRESSURE: 137 MMHG

## 2024-09-09 ENCOUNTER — OFFICE VISIT (OUTPATIENT)
Dept: CARDIOLOGY | Facility: CLINIC | Age: 59
End: 2024-09-09
Payer: OTHER GOVERNMENT

## 2024-09-09 VITALS
HEART RATE: 73 BPM | BODY MASS INDEX: 44.68 KG/M2 | SYSTOLIC BLOOD PRESSURE: 140 MMHG | WEIGHT: 315 LBS | OXYGEN SATURATION: 95 % | DIASTOLIC BLOOD PRESSURE: 80 MMHG

## 2024-09-09 DIAGNOSIS — E66.01 OBESITY, CLASS III, BMI 40-49.9 (MORBID OBESITY): ICD-10-CM

## 2024-09-09 DIAGNOSIS — I48.0 PAROXYSMAL ATRIAL FIBRILLATION: ICD-10-CM

## 2024-09-09 DIAGNOSIS — G47.33 OBSTRUCTIVE SLEEP APNEA: ICD-10-CM

## 2024-09-09 DIAGNOSIS — E11.9 TYPE 2 DIABETES MELLITUS WITHOUT COMPLICATION, WITH LONG-TERM CURRENT USE OF INSULIN: ICD-10-CM

## 2024-09-09 DIAGNOSIS — Z79.4 TYPE 2 DIABETES MELLITUS WITHOUT COMPLICATION, WITH LONG-TERM CURRENT USE OF INSULIN: ICD-10-CM

## 2024-09-09 DIAGNOSIS — I10 HYPERTENSION, UNSPECIFIED TYPE: Primary | ICD-10-CM

## 2024-09-09 PROCEDURE — 99999 PR PBB SHADOW E&M-EST. PATIENT-LVL III: CPT | Mod: PBBFAC,,, | Performed by: INTERNAL MEDICINE

## 2024-09-09 PROCEDURE — 99213 OFFICE O/P EST LOW 20 MIN: CPT | Mod: PBBFAC | Performed by: INTERNAL MEDICINE

## 2024-09-09 PROCEDURE — 99214 OFFICE O/P EST MOD 30 MIN: CPT | Mod: S$PBB,,, | Performed by: INTERNAL MEDICINE

## 2024-09-09 RX ORDER — CARVEDILOL 6.25 MG/1
6.25 TABLET ORAL 2 TIMES DAILY WITH MEALS
Qty: 180 TABLET | Refills: 3 | Status: SHIPPED | OUTPATIENT
Start: 2024-09-09 | End: 2025-09-09

## 2024-10-06 ENCOUNTER — HOSPITAL ENCOUNTER (EMERGENCY)
Facility: HOSPITAL | Age: 59
Discharge: HOME | End: 2024-10-06
Attending: EMERGENCY MEDICINE
Payer: OTHER GOVERNMENT

## 2024-10-06 VITALS
DIASTOLIC BLOOD PRESSURE: 77 MMHG | TEMPERATURE: 98 F | HEART RATE: 70 BPM | RESPIRATION RATE: 16 BRPM | WEIGHT: 315 LBS | OXYGEN SATURATION: 98 % | BODY MASS INDEX: 44.92 KG/M2 | SYSTOLIC BLOOD PRESSURE: 165 MMHG

## 2024-10-06 DIAGNOSIS — N20.0 KIDNEY STONE: Primary | ICD-10-CM

## 2024-10-06 LAB
ALBUMIN SERPL BCP-MCNC: 3.6 G/DL (ref 3.5–5.2)
ALP SERPL-CCNC: 58 U/L (ref 55–135)
ALT SERPL W/O P-5'-P-CCNC: 27 U/L (ref 10–44)
ANION GAP SERPL CALC-SCNC: 10 MMOL/L (ref 8–16)
AST SERPL-CCNC: 18 U/L (ref 10–40)
BACTERIA #/AREA URNS HPF: NORMAL /HPF
BASOPHILS # BLD AUTO: 0.02 K/UL (ref 0–0.2)
BASOPHILS NFR BLD: 0.4 % (ref 0–1.9)
BILIRUB SERPL-MCNC: 0.7 MG/DL (ref 0.1–1)
BILIRUB UR QL STRIP: NEGATIVE
BUN SERPL-MCNC: 17 MG/DL (ref 6–20)
CALCIUM SERPL-MCNC: 9.6 MG/DL (ref 8.7–10.5)
CHLORIDE SERPL-SCNC: 107 MMOL/L (ref 95–110)
CLARITY UR: CLEAR
CO2 SERPL-SCNC: 23 MMOL/L (ref 23–29)
COLOR UR: COLORLESS
CREAT SERPL-MCNC: 1.2 MG/DL (ref 0.5–1.4)
DIFFERENTIAL METHOD BLD: ABNORMAL
EOSINOPHIL # BLD AUTO: 0.1 K/UL (ref 0–0.5)
EOSINOPHIL NFR BLD: 1.8 % (ref 0–8)
ERYTHROCYTE [DISTWIDTH] IN BLOOD BY AUTOMATED COUNT: 13.5 % (ref 11.5–14.5)
EST. GFR  (NO RACE VARIABLE): >60 ML/MIN/1.73 M^2
GLUCOSE SERPL-MCNC: 413 MG/DL (ref 70–110)
GLUCOSE UR QL STRIP: ABNORMAL
HCT VFR BLD AUTO: 43.2 % (ref 40–54)
HCV AB SERPL QL IA: NEGATIVE
HEP C VIRUS HOLD SPECIMEN: NORMAL
HGB BLD-MCNC: 14 G/DL (ref 14–18)
HGB UR QL STRIP: NEGATIVE
HIV 1+2 AB+HIV1 P24 AG SERPL QL IA: NEGATIVE
IMM GRANULOCYTES # BLD AUTO: 0.01 K/UL (ref 0–0.04)
IMM GRANULOCYTES NFR BLD AUTO: 0.2 % (ref 0–0.5)
KETONES UR QL STRIP: NEGATIVE
LEUKOCYTE ESTERASE UR QL STRIP: NEGATIVE
LIPASE SERPL-CCNC: 13 U/L (ref 4–60)
LYMPHOCYTES # BLD AUTO: 0.7 K/UL (ref 1–4.8)
LYMPHOCYTES NFR BLD: 14.8 % (ref 18–48)
MCH RBC QN AUTO: 27.7 PG (ref 27–31)
MCHC RBC AUTO-ENTMCNC: 32.4 G/DL (ref 32–36)
MCV RBC AUTO: 86 FL (ref 82–98)
MICROSCOPIC COMMENT: NORMAL
MONOCYTES # BLD AUTO: 0.4 K/UL (ref 0.3–1)
MONOCYTES NFR BLD: 7.8 % (ref 4–15)
NEUTROPHILS # BLD AUTO: 3.7 K/UL (ref 1.8–7.7)
NEUTROPHILS NFR BLD: 75 % (ref 38–73)
NITRITE UR QL STRIP: NEGATIVE
NRBC BLD-RTO: 0 /100 WBC
PH UR STRIP: 5 [PH] (ref 5–8)
PLATELET # BLD AUTO: 145 K/UL (ref 150–450)
PMV BLD AUTO: 10.5 FL (ref 9.2–12.9)
POTASSIUM SERPL-SCNC: 4.3 MMOL/L (ref 3.5–5.1)
PROT SERPL-MCNC: 7 G/DL (ref 6–8.4)
PROT UR QL STRIP: NEGATIVE
RBC # BLD AUTO: 5.05 M/UL (ref 4.6–6.2)
SODIUM SERPL-SCNC: 140 MMOL/L (ref 136–145)
SP GR UR STRIP: 1.01 (ref 1–1.03)
URN SPEC COLLECT METH UR: ABNORMAL
UROBILINOGEN UR STRIP-ACNC: NEGATIVE EU/DL
WBC # BLD AUTO: 4.87 K/UL (ref 3.9–12.7)
YEAST URNS QL MICRO: NORMAL

## 2024-10-06 PROCEDURE — 96374 THER/PROPH/DIAG INJ IV PUSH: CPT

## 2024-10-06 PROCEDURE — 80053 COMPREHEN METABOLIC PANEL: CPT | Performed by: EMERGENCY MEDICINE

## 2024-10-06 PROCEDURE — 81000 URINALYSIS NONAUTO W/SCOPE: CPT | Performed by: EMERGENCY MEDICINE

## 2024-10-06 PROCEDURE — 96375 TX/PRO/DX INJ NEW DRUG ADDON: CPT

## 2024-10-06 PROCEDURE — 96361 HYDRATE IV INFUSION ADD-ON: CPT

## 2024-10-06 PROCEDURE — 25000003 PHARM REV CODE 250: Performed by: EMERGENCY MEDICINE

## 2024-10-06 PROCEDURE — 83690 ASSAY OF LIPASE: CPT | Performed by: EMERGENCY MEDICINE

## 2024-10-06 PROCEDURE — 87389 HIV-1 AG W/HIV-1&-2 AB AG IA: CPT | Performed by: EMERGENCY MEDICINE

## 2024-10-06 PROCEDURE — 86803 HEPATITIS C AB TEST: CPT | Performed by: EMERGENCY MEDICINE

## 2024-10-06 PROCEDURE — 63600175 PHARM REV CODE 636 W HCPCS: Performed by: EMERGENCY MEDICINE

## 2024-10-06 PROCEDURE — 99285 EMERGENCY DEPT VISIT HI MDM: CPT | Mod: 25

## 2024-10-06 PROCEDURE — 85025 COMPLETE CBC W/AUTO DIFF WBC: CPT | Performed by: EMERGENCY MEDICINE

## 2024-10-06 RX ORDER — MORPHINE SULFATE 4 MG/ML
4 INJECTION, SOLUTION INTRAMUSCULAR; INTRAVENOUS
Status: COMPLETED | OUTPATIENT
Start: 2024-10-06 | End: 2024-10-06

## 2024-10-06 RX ORDER — ONDANSETRON HYDROCHLORIDE 2 MG/ML
4 INJECTION, SOLUTION INTRAVENOUS
Status: COMPLETED | OUTPATIENT
Start: 2024-10-06 | End: 2024-10-06

## 2024-10-06 RX ORDER — ONDANSETRON 4 MG/1
4 TABLET, FILM COATED ORAL EVERY 6 HOURS PRN
Qty: 12 TABLET | Refills: 0 | Status: SHIPPED | OUTPATIENT
Start: 2024-10-06

## 2024-10-06 RX ORDER — HYDROCODONE BITARTRATE AND ACETAMINOPHEN 10; 325 MG/1; MG/1
1 TABLET ORAL EVERY 6 HOURS PRN
Qty: 12 TABLET | Refills: 0 | Status: SHIPPED | OUTPATIENT
Start: 2024-10-06

## 2024-10-06 RX ORDER — TAMSULOSIN HYDROCHLORIDE 0.4 MG/1
0.4 CAPSULE ORAL DAILY
Qty: 30 CAPSULE | Refills: 0 | Status: SHIPPED | OUTPATIENT
Start: 2024-10-06 | End: 2024-11-05

## 2024-10-06 RX ORDER — KETOROLAC TROMETHAMINE 30 MG/ML
15 INJECTION, SOLUTION INTRAMUSCULAR; INTRAVENOUS
Status: COMPLETED | OUTPATIENT
Start: 2024-10-06 | End: 2024-10-06

## 2024-10-06 RX ORDER — TAMSULOSIN HYDROCHLORIDE 0.4 MG/1
0.4 CAPSULE ORAL
Status: COMPLETED | OUTPATIENT
Start: 2024-10-06 | End: 2024-10-06

## 2024-10-06 RX ADMIN — ONDANSETRON 4 MG: 2 INJECTION INTRAMUSCULAR; INTRAVENOUS at 09:10

## 2024-10-06 RX ADMIN — MORPHINE SULFATE 4 MG: 4 INJECTION INTRAVENOUS at 09:10

## 2024-10-06 RX ADMIN — SODIUM CHLORIDE 1000 ML: 0.9 INJECTION, SOLUTION INTRAVENOUS at 09:10

## 2024-10-06 RX ADMIN — TAMSULOSIN HYDROCHLORIDE 0.4 MG: 0.4 CAPSULE ORAL at 11:10

## 2024-10-06 RX ADMIN — KETOROLAC TROMETHAMINE 15 MG: 30 INJECTION, SOLUTION INTRAMUSCULAR at 09:10

## 2024-10-06 NOTE — ED PROVIDER NOTES
SCRIBE #1 NOTE: I, Gage Argueta, am scribing for, and in the presence of, Jack Portillo Jr., MD. I have scribed the entire note.       History     Chief Complaint   Patient presents with    Flank Pain     Pt c/o right flank pain present on waking this morning.  Pt c/o nausea and increasing pain.  Pt also c/o dysuria.     Review of patient's allergies indicates:   Allergen Reactions    Sulfa (sulfonamide antibiotics) Rash         History of Present Illness     HPI    10/6/2024, 9:02 AM  History obtained from the patient      History of Present Illness: Roland Lopez is a 59 y.o. male patient who presents to the Emergency Department for evaluation of flank pain which onset this AM. Pt repots associated nausea and dysuria. Pt denies any vomiting, hematuria, and all other sxs at this time. Pt also denies any abd surgical hx. No further complaints or concerns at this time.  Patient was notes the pain is in right flank radiating to the front.  He may have had a kidney stone in the past.  Denies any hematuria.  No dysuria frequency.  Mild nausea associated with the onset of pain which was described as severe      Arrival mode: Personal Transportation    PCP: Emilee, Primary Doctor        Past Medical History:  Past Medical History:   Diagnosis Date    Hypertension     EVELYN (obstructive sleep apnea)        Past Surgical History:  Past Surgical History:   Procedure Laterality Date    TRANSESOPHAGEAL ECHOCARDIOGRAM WITH POSSIBLE CARDIOVERSION (SOFYA W/ POSS CARDIOVERSION) N/A 6/27/2023    Procedure: Transesophageal echo (SOFYA) intra-procedure log documentation;  Surgeon: Santy Martinez MD;  Location: [facility] CATH LAB;  Service: Cardiology;  Laterality: N/A;         Family History:  No family history on file.    Social History:  Social History     Tobacco Use    Smoking status: Never    Smokeless tobacco: Never   Substance and Sexual Activity    Alcohol use: Not Currently     Alcohol/week: 1.0 standard drink of alcohol      Types: 1 Glasses of wine per week    Drug use: Never    Sexual activity: Yes     Partners: Female        Review of Systems     Review of Systems   Constitutional:  Negative for chills, diaphoresis and fever.   HENT:  Negative for congestion.    Respiratory:  Negative for cough and shortness of breath.    Cardiovascular:  Negative for chest pain.   Gastrointestinal:  Positive for nausea. Negative for abdominal pain, diarrhea and vomiting.   Genitourinary:  Positive for dysuria and flank pain (R). Negative for hematuria.   Musculoskeletal:  Negative for back pain.   Skin:  Negative for rash.   Neurological:  Negative for dizziness, weakness and headaches.   All other systems reviewed and are negative.       Physical Exam     Initial Vitals [10/06/24 0827]   BP Pulse Resp Temp SpO2   (!) 228/109 62 20 97.7 °F (36.5 °C) 97 %      MAP       --          Physical Exam  Nursing Notes and Vital Signs Reviewed.  Constitutional: Patient is in no acute distress. Well-developed and well-nourished.  Head: Atraumatic. Normocephalic.  Eyes:  EOM intact.  No scleral icterus.  ENT: Mucous membranes are moist.  Nares clear   Neck:  Full ROM. No JVD.  Cardiovascular: Regular rate. Regular rhythm No murmurs, rubs, or gallops. Distal pulses are 2+ and symmetric  Pulmonary/Chest: No respiratory distress. Clear to auscultation bilaterally. No wheezing or rales.  Equal chest wall rise bilaterally  Abdominal: Soft and non-distended.  There is no tenderness.  No rebound, guarding, or rigidity. Good bowel sounds. R flank tenderness.  No tenderness over McBurney's point.  Genitourinary:  Right CVA tenderness.  No suprapubic tenderness  Musculoskeletal: Moves all extremities. No obvious deformities.  5 x 5 strength in all extremities   Skin: Warm and dry.  Neurological:  Alert, awake, and appropriate.  Normal speech.  No acute focal neurological deficits are appreciated.  Two through 12 intact bilaterally.  Psychiatric: Normal affect. Good eye  contact. Appropriate in content.       ED Course   Procedures  ED Vital Signs:  Vitals:    10/06/24 0827 10/06/24 0845 10/06/24 0922 10/06/24 1000   BP: (!) 228/109 (!) 200/99  (!) 167/80   Pulse: 62 62  62   Resp: 20 20 16 (!) 23   Temp: 97.7 °F (36.5 °C)      TempSrc: Oral      SpO2: 97% 96%  95%   Weight: (!) 158.7 kg (349 lb 13.9 oz)       10/06/24 1030   BP: (!) 149/66   Pulse: 60   Resp: (!) 25   Temp:    TempSrc:    SpO2: 95%   Weight:        Abnormal Lab Results:  Labs Reviewed   COMPREHENSIVE METABOLIC PANEL - Abnormal       Result Value    Sodium 140      Potassium 4.3      Chloride 107      CO2 23      Glucose 413 (*)     BUN 17      Creatinine 1.2      Calcium 9.6      Total Protein 7.0      Albumin 3.6      Total Bilirubin 0.7      Alkaline Phosphatase 58      AST 18      ALT 27      eGFR >60      Anion Gap 10     CBC W/ AUTO DIFFERENTIAL - Abnormal    WBC 4.87      RBC 5.05      Hemoglobin 14.0      Hematocrit 43.2      MCV 86      MCH 27.7      MCHC 32.4      RDW 13.5      Platelets 145 (*)     MPV 10.5      Immature Granulocytes 0.2      Gran # (ANC) 3.7      Immature Grans (Abs) 0.01      Lymph # 0.7 (*)     Mono # 0.4      Eos # 0.1      Baso # 0.02      nRBC 0      Gran % 75.0 (*)     Lymph % 14.8 (*)     Mono % 7.8      Eosinophil % 1.8      Basophil % 0.4      Differential Method Automated     URINALYSIS, REFLEX TO URINE CULTURE - Abnormal    Specimen UA Urine, Clean Catch      Color, UA Colorless (*)     Appearance, UA Clear      pH, UA 5.0      Specific Gravity, UA 1.010      Protein, UA Negative      Glucose, UA 4+ (*)     Ketones, UA Negative      Bilirubin (UA) Negative      Occult Blood UA Negative      Nitrite, UA Negative      Urobilinogen, UA Negative      Leukocytes, UA Negative      Narrative:     Specimen Source->Urine   HIV 1 / 2 ANTIBODY    HIV 1/2 Ag/Ab Negative      Narrative:     Release to patient->Immediate   HEPATITIS C ANTIBODY    Hepatitis C Ab Negative      Narrative:      Release to patient->Immediate   HEP C VIRUS HOLD SPECIMEN    HEP C Virus Hold Specimen Hold for HCV sendout      Narrative:     Release to patient->Immediate   LIPASE    Lipase 13     URINALYSIS MICROSCOPIC    Bacteria None      Yeast, UA None      Microscopic Comment SEE COMMENT      Narrative:     Specimen Source->Urine        All Lab Results:  Results for orders placed or performed during the hospital encounter of 10/06/24   HIV 1/2 Ag/Ab (4th Gen)    Collection Time: 10/06/24  9:22 AM   Result Value Ref Range    HIV 1/2 Ag/Ab Negative Negative   Hepatitis C Antibody    Collection Time: 10/06/24  9:22 AM   Result Value Ref Range    Hepatitis C Ab Negative Negative   HCV Virus Hold Specimen    Collection Time: 10/06/24  9:22 AM   Result Value Ref Range    HEP C Virus Hold Specimen Hold for HCV sendout    Comprehensive metabolic panel    Collection Time: 10/06/24  9:22 AM   Result Value Ref Range    Sodium 140 136 - 145 mmol/L    Potassium 4.3 3.5 - 5.1 mmol/L    Chloride 107 95 - 110 mmol/L    CO2 23 23 - 29 mmol/L    Glucose 413 (H) 70 - 110 mg/dL    BUN 17 6 - 20 mg/dL    Creatinine 1.2 0.5 - 1.4 mg/dL    Calcium 9.6 8.7 - 10.5 mg/dL    Total Protein 7.0 6.0 - 8.4 g/dL    Albumin 3.6 3.5 - 5.2 g/dL    Total Bilirubin 0.7 0.1 - 1.0 mg/dL    Alkaline Phosphatase 58 55 - 135 U/L    AST 18 10 - 40 U/L    ALT 27 10 - 44 U/L    eGFR >60 >60 mL/min/1.73 m^2    Anion Gap 10 8 - 16 mmol/L   CBC auto differential    Collection Time: 10/06/24  9:22 AM   Result Value Ref Range    WBC 4.87 3.90 - 12.70 K/uL    RBC 5.05 4.60 - 6.20 M/uL    Hemoglobin 14.0 14.0 - 18.0 g/dL    Hematocrit 43.2 40.0 - 54.0 %    MCV 86 82 - 98 fL    MCH 27.7 27.0 - 31.0 pg    MCHC 32.4 32.0 - 36.0 g/dL    RDW 13.5 11.5 - 14.5 %    Platelets 145 (L) 150 - 450 K/uL    MPV 10.5 9.2 - 12.9 fL    Immature Granulocytes 0.2 0.0 - 0.5 %    Gran # (ANC) 3.7 1.8 - 7.7 K/uL    Immature Grans (Abs) 0.01 0.00 - 0.04 K/uL    Lymph # 0.7 (L) 1.0 - 4.8 K/uL     Mono # 0.4 0.3 - 1.0 K/uL    Eos # 0.1 0.0 - 0.5 K/uL    Baso # 0.02 0.00 - 0.20 K/uL    nRBC 0 0 /100 WBC    Gran % 75.0 (H) 38.0 - 73.0 %    Lymph % 14.8 (L) 18.0 - 48.0 %    Mono % 7.8 4.0 - 15.0 %    Eosinophil % 1.8 0.0 - 8.0 %    Basophil % 0.4 0.0 - 1.9 %    Differential Method Automated    Lipase    Collection Time: 10/06/24  9:22 AM   Result Value Ref Range    Lipase 13 4 - 60 U/L   Urinalysis, Reflex to Urine Culture Urine, Clean Catch    Collection Time: 10/06/24 11:29 AM    Specimen: Urine, Clean Catch   Result Value Ref Range    Specimen UA Urine, Clean Catch     Color, UA Colorless (A) Yellow, Straw, Gemma    Appearance, UA Clear Clear    pH, UA 5.0 5.0 - 8.0    Specific Gravity, UA 1.010 1.005 - 1.030    Protein, UA Negative Negative    Glucose, UA 4+ (A) Negative    Ketones, UA Negative Negative    Bilirubin (UA) Negative Negative    Occult Blood UA Negative Negative    Nitrite, UA Negative Negative    Urobilinogen, UA Negative <2.0 EU/dL    Leukocytes, UA Negative Negative   Urinalysis Microscopic    Collection Time: 10/06/24 11:29 AM   Result Value Ref Range    Bacteria None None-Occ /hpf    Yeast, UA None None    Microscopic Comment SEE COMMENT          Imaging Results:  Imaging Results              CT Renal Stone Study ABD Pelvis WO (Final result)  Result time 10/06/24 09:59:56      Final result by Domingo Cordero MD (Timothy) (10/06/24 09:59:56)                   Impression:      4 mm stone distal right ureter 3 cm from the UV junction with mild hydronephrosis.  Moderate perinephric stranding is present.    No other renal stones.  No acute bowel abnormalities.    6.4 cm umbilical hernia containing fat    All CT scans at this facility use dose modulation, iterative reconstructions, and/or weight base dosing when appropriate to reduce radiation dose to as low as reasonably achievable      Electronically signed by: Domingo Cordero MD  Date:    10/06/2024  Time:    09:59               Narrative:     EXAMINATION:  CT RENAL STONE STUDY ABD PELVIS WO    CLINICAL HISTORY:  Flank pain, kidney stone suspected;    TECHNIQUE:  Noncontrast images were obtained    COMPARISON:  CT abdomen pelvis, 10/30/2016.    FINDINGS:  Lung bases are clear    The liver, the spleen, and the pancreas appear normal.    The gallbladder is unremarkable.  There is no bile duct dilatation.    4 mm stone distal right ureter approximately 3 cm from the UV junction.  Mild hydronephrosis.  Moderate perinephric stranding.  No other right renal stones.  Left kidney is unremarkable.  No stones or hydronephrosis.  The    The aorta and inferior vena cava are unremarkable.    There are no acute bowel abnormalities.     No evidence of appendicitis.  No evidence of diverticulitis.    Bladder is normal. No abnormal masses or fluid collections in the pelvis.    6.4 cm umbilical hernia containing fat.    Skeletal structures are intact.  No acute skeletal findings.                                                The Emergency Provider reviewed the vital signs and test results, which are outlined above.     ED Discussion     12:17 PM: Reassessed pt at this time. Discussed with pt all pertinent ED information and results. Discussed pt dx and plan of tx. Gave pt all f/u and return to the ED instructions. All questions and concerns were addressed at this time. Pt expresses understanding of information and instructions, and is comfortable with plan to discharge. Pt is stable for discharge.    I discussed with patient and/or family/caretaker that evaluation in the ED does not suggest any emergent or life threatening medical conditions requiring immediate intervention beyond what was provided in the ED, and I believe patient is safe for discharge.  Regardless, an unremarkable evaluation in the ED does not preclude the development or presence of a serious of life threatening condition. As such, patient was instructed to return immediately for any worsening or change  in current symptoms.         Medical Decision Making  Differential diagnosis: Flank pain, pyelonephritis, renal colic, kidney stone, appendicitis    Patient was evaluated history physical examination.  He was right lower quadrant pain with a kidney stone noted on exam.  Has a 4 mm stone.  It was DKA or obstruction.  No urinary infection.  Patient was pain is well-controlled.  Stable safe for discharge in my opinion.  Discussed with him all findings well as plan of care.  He verbalized agreement understanding with all and seems stable safe for discharge in my opinion    Amount and/or Complexity of Data Reviewed  Labs: ordered. Decision-making details documented in ED Course.     Details: UA is negative save for 4+ glucose.  CMP shows a sugar for 13 but a normal gap.  White count is normal.  Normal H&H  Radiology: ordered. Decision-making details documented in ED Course.     Details: 4 mm distal stone    Risk  OTC drugs.  Prescription drug management.  Parenteral controlled substances.  Decision regarding hospitalization.                ED Medication(s):  Medications   sodium chloride 0.9% bolus 1,000 mL 1,000 mL (1,000 mLs Intravenous New Bag 10/6/24 0915)   morphine injection 4 mg (4 mg Intravenous Given 10/6/24 0923)   ondansetron injection 4 mg (4 mg Intravenous Given 10/6/24 0923)   ketorolac injection 15 mg (15 mg Intravenous Given 10/6/24 0922)   tamsulosin 24 hr capsule 0.4 mg (0.4 mg Oral Given 10/6/24 1129)       New Prescriptions    HYDROCODONE-ACETAMINOPHEN (NORCO)  MG PER TABLET    Take 1 tablet by mouth every 6 (six) hours as needed.    ONDANSETRON (ZOFRAN) 4 MG TABLET    Take 1 tablet (4 mg total) by mouth every 6 (six) hours as needed for Nausea.    TAMSULOSIN (FLOMAX) 0.4 MG CAP    Take 1 capsule (0.4 mg total) by mouth once daily.        Follow-up Information       Rodrigue Chao MD.    Specialty: Urology  Contact information:  [id number] [facility]  [id number]  [phone]                                 Scribe Attestation:   Scribe #1: I performed the above scribed service and the documentation accurately describes the services I performed. I attest to the accuracy of the note.     Attending:   Physician Attestation Statement for Scribe #1: I, Jack Portillo Jr., MD, personally performed the services described in this documentation, as scribed by Gage Argueta, in my presence, and it is both accurate and complete.           Clinical Impression       ICD-10-CM ICD-9-CM   1. Kidney stone  N20.0 592.0       Disposition:   Disposition: Discharged  Condition: Stable         Jack Portillo Jr., MD  10/06/24 1226

## 2024-10-08 PROCEDURE — 99285 EMERGENCY DEPT VISIT HI MDM: CPT | Mod: 25

## 2024-10-08 PROCEDURE — 96367 TX/PROPH/DG ADDL SEQ IV INF: CPT

## 2024-10-08 PROCEDURE — 96361 HYDRATE IV INFUSION ADD-ON: CPT

## 2024-10-08 PROCEDURE — 82962 GLUCOSE BLOOD TEST: CPT

## 2024-10-08 PROCEDURE — 96365 THER/PROPH/DIAG IV INF INIT: CPT

## 2024-10-08 PROCEDURE — 96375 TX/PRO/DX INJ NEW DRUG ADDON: CPT

## 2024-10-09 ENCOUNTER — ANESTHESIA (OUTPATIENT)
Dept: SURGERY | Facility: HOSPITAL | Age: 59
End: 2024-10-09
Payer: OTHER GOVERNMENT

## 2024-10-09 ENCOUNTER — ANESTHESIA EVENT (OUTPATIENT)
Dept: SURGERY | Facility: HOSPITAL | Age: 59
End: 2024-10-09
Payer: OTHER GOVERNMENT

## 2024-10-09 ENCOUNTER — HOSPITAL ENCOUNTER (OUTPATIENT)
Facility: HOSPITAL | Age: 59
Discharge: HOME | End: 2024-10-10
Attending: EMERGENCY MEDICINE | Admitting: INTERNAL MEDICINE
Payer: OTHER GOVERNMENT

## 2024-10-09 DIAGNOSIS — R10.9 RIGHT FLANK PAIN: ICD-10-CM

## 2024-10-09 DIAGNOSIS — Z01.810 PREOP CARDIOVASCULAR EXAM: ICD-10-CM

## 2024-10-09 DIAGNOSIS — N20.0 RENAL CALCULI: ICD-10-CM

## 2024-10-09 DIAGNOSIS — N13.30 BILATERAL HYDRONEPHROSIS: Primary | ICD-10-CM

## 2024-10-09 DIAGNOSIS — N23 RENAL COLIC ON RIGHT SIDE: ICD-10-CM

## 2024-10-09 DIAGNOSIS — R07.9 CHEST PAIN: ICD-10-CM

## 2024-10-09 DIAGNOSIS — N20.1 RIGHT URETERAL STONE: ICD-10-CM

## 2024-10-09 DIAGNOSIS — N13.2 URETERAL STONE WITH HYDRONEPHROSIS: ICD-10-CM

## 2024-10-09 PROBLEM — I48.0 PAF (PAROXYSMAL ATRIAL FIBRILLATION): Status: ACTIVE | Noted: 2024-10-09

## 2024-10-09 PROBLEM — E11.9 INSULIN DEPENDENT TYPE 2 DIABETES MELLITUS: Status: ACTIVE | Noted: 2024-10-09

## 2024-10-09 PROBLEM — N12 PYELONEPHRITIS: Status: ACTIVE | Noted: 2024-10-09

## 2024-10-09 PROBLEM — G47.33 OSA ON CPAP: Status: ACTIVE | Noted: 2024-10-09

## 2024-10-09 PROBLEM — K74.60 LIVER CIRRHOSIS: Status: ACTIVE | Noted: 2024-10-09

## 2024-10-09 PROBLEM — Z79.4 INSULIN DEPENDENT TYPE 2 DIABETES MELLITUS: Status: ACTIVE | Noted: 2024-10-09

## 2024-10-09 PROBLEM — D69.6 THROMBOCYTOPENIA: Status: ACTIVE | Noted: 2024-10-09

## 2024-10-09 PROBLEM — F10.10 ALCOHOL ABUSE: Status: ACTIVE | Noted: 2024-10-09

## 2024-10-09 PROBLEM — I10 PRIMARY HYPERTENSION: Status: ACTIVE | Noted: 2024-10-09

## 2024-10-09 PROBLEM — E78.5 HYPERLIPIDEMIA: Status: ACTIVE | Noted: 2024-10-09

## 2024-10-09 LAB
ALBUMIN SERPL BCP-MCNC: 3.6 G/DL (ref 3.5–5.2)
ALP SERPL-CCNC: 66 U/L (ref 55–135)
ALT SERPL W/O P-5'-P-CCNC: 23 U/L (ref 10–44)
ANION GAP SERPL CALC-SCNC: 10 MMOL/L (ref 8–16)
AST SERPL-CCNC: 16 U/L (ref 10–40)
BACTERIA #/AREA URNS HPF: NORMAL /HPF
BASOPHILS # BLD AUTO: 0.03 K/UL (ref 0–0.2)
BASOPHILS NFR BLD: 0.5 % (ref 0–1.9)
BILIRUB SERPL-MCNC: 0.7 MG/DL (ref 0.1–1)
BILIRUB UR QL STRIP: NEGATIVE
BUN SERPL-MCNC: 17 MG/DL (ref 6–20)
CALCIUM SERPL-MCNC: 9.9 MG/DL (ref 8.7–10.5)
CHLORIDE SERPL-SCNC: 104 MMOL/L (ref 95–110)
CLARITY UR: CLEAR
CO2 SERPL-SCNC: 23 MMOL/L (ref 23–29)
COLOR UR: YELLOW
CREAT SERPL-MCNC: 1.3 MG/DL (ref 0.5–1.4)
DIFFERENTIAL METHOD BLD: ABNORMAL
EOSINOPHIL # BLD AUTO: 0.1 K/UL (ref 0–0.5)
EOSINOPHIL NFR BLD: 1 % (ref 0–8)
ERYTHROCYTE [DISTWIDTH] IN BLOOD BY AUTOMATED COUNT: 13.4 % (ref 11.5–14.5)
EST. GFR  (NO RACE VARIABLE): >60 ML/MIN/1.73 M^2
ESTIMATED AVG GLUCOSE: 289 MG/DL (ref 68–131)
FOLATE SERPL-MCNC: 13.3 NG/ML (ref 4–24)
GLUCOSE SERPL-MCNC: 419 MG/DL (ref 70–110)
GLUCOSE UR QL STRIP: ABNORMAL
HAV IGM SERPL QL IA: NORMAL
HBA1C MFR BLD: 11.7 % (ref 4–5.6)
HBV CORE IGM SERPL QL IA: NORMAL
HBV SURFACE AG SERPL QL IA: NORMAL
HCT VFR BLD AUTO: 42 % (ref 40–54)
HCV AB SERPL QL IA: NORMAL
HGB BLD-MCNC: 14 G/DL (ref 14–18)
HGB UR QL STRIP: NEGATIVE
IMM GRANULOCYTES # BLD AUTO: 0.01 K/UL (ref 0–0.04)
IMM GRANULOCYTES NFR BLD AUTO: 0.2 % (ref 0–0.5)
INR PPP: 1.1 (ref 0.8–1.2)
KETONES UR QL STRIP: ABNORMAL
LEUKOCYTE ESTERASE UR QL STRIP: NEGATIVE
LIPASE SERPL-CCNC: 6 U/L (ref 4–60)
LYMPHOCYTES # BLD AUTO: 0.5 K/UL (ref 1–4.8)
LYMPHOCYTES NFR BLD: 8.4 % (ref 18–48)
MCH RBC QN AUTO: 28.1 PG (ref 27–31)
MCHC RBC AUTO-ENTMCNC: 33.3 G/DL (ref 32–36)
MCV RBC AUTO: 84 FL (ref 82–98)
MICROSCOPIC COMMENT: NORMAL
MONOCYTES # BLD AUTO: 0.6 K/UL (ref 0.3–1)
MONOCYTES NFR BLD: 8.9 % (ref 4–15)
NEUTROPHILS # BLD AUTO: 5.1 K/UL (ref 1.8–7.7)
NEUTROPHILS NFR BLD: 81 % (ref 38–73)
NITRITE UR QL STRIP: NEGATIVE
NRBC BLD-RTO: 0 /100 WBC
PH UR STRIP: 6 [PH] (ref 5–8)
PLATELET # BLD AUTO: 142 K/UL (ref 150–450)
PMV BLD AUTO: 10.2 FL (ref 9.2–12.9)
POCT GLUCOSE: 269 MG/DL (ref 70–110)
POCT GLUCOSE: 270 MG/DL (ref 70–110)
POCT GLUCOSE: 390 MG/DL (ref 70–110)
POTASSIUM SERPL-SCNC: 4 MMOL/L (ref 3.5–5.1)
PROT SERPL-MCNC: 7.2 G/DL (ref 6–8.4)
PROT UR QL STRIP: NEGATIVE
PROTHROMBIN TIME: 11.9 SEC (ref 9–12.5)
RBC # BLD AUTO: 4.98 M/UL (ref 4.6–6.2)
SODIUM SERPL-SCNC: 137 MMOL/L (ref 136–145)
SP GR UR STRIP: 1.02 (ref 1–1.03)
URN SPEC COLLECT METH UR: ABNORMAL
UROBILINOGEN UR STRIP-ACNC: NEGATIVE EU/DL
VIT B12 SERPL-MCNC: 267 PG/ML (ref 210–950)
WBC # BLD AUTO: 6.29 K/UL (ref 3.9–12.7)
YEAST URNS QL MICRO: NORMAL

## 2024-10-09 PROCEDURE — 25500020 PHARM REV CODE 255: Performed by: UROLOGY

## 2024-10-09 PROCEDURE — C1769 GUIDE WIRE: HCPCS | Performed by: UROLOGY

## 2024-10-09 PROCEDURE — 81000 URINALYSIS NONAUTO W/SCOPE: CPT | Mod: XB | Performed by: EMERGENCY MEDICINE

## 2024-10-09 PROCEDURE — C1758 CATHETER, URETERAL: HCPCS | Performed by: UROLOGY

## 2024-10-09 PROCEDURE — 71000033 HC RECOVERY, INTIAL HOUR: Performed by: UROLOGY

## 2024-10-09 PROCEDURE — 74420 UROGRAPHY RTRGR +-KUB: CPT | Mod: 26,,, | Performed by: UROLOGY

## 2024-10-09 PROCEDURE — 63600175 PHARM REV CODE 636 W HCPCS: Performed by: NURSE ANESTHETIST, CERTIFIED REGISTERED

## 2024-10-09 PROCEDURE — 99204 OFFICE O/P NEW MOD 45 MIN: CPT | Mod: 25,,, | Performed by: UROLOGY

## 2024-10-09 PROCEDURE — G0378 HOSPITAL OBSERVATION PER HR: HCPCS

## 2024-10-09 PROCEDURE — 85025 COMPLETE CBC W/AUTO DIFF WBC: CPT | Performed by: EMERGENCY MEDICINE

## 2024-10-09 PROCEDURE — 99900035 HC TECH TIME PER 15 MIN (STAT)

## 2024-10-09 PROCEDURE — 52005 CYSTO W/URTRL CATHJ: CPT | Mod: ,,, | Performed by: UROLOGY

## 2024-10-09 PROCEDURE — 25000003 PHARM REV CODE 250: Performed by: EMERGENCY MEDICINE

## 2024-10-09 PROCEDURE — 96372 THER/PROPH/DIAG INJ SC/IM: CPT | Performed by: INTERNAL MEDICINE

## 2024-10-09 PROCEDURE — 63600175 PHARM REV CODE 636 W HCPCS: Performed by: EMERGENCY MEDICINE

## 2024-10-09 PROCEDURE — 37000009 HC ANESTHESIA EA ADD 15 MINS: Performed by: UROLOGY

## 2024-10-09 PROCEDURE — 93005 ELECTROCARDIOGRAM TRACING: CPT

## 2024-10-09 PROCEDURE — 63600175 PHARM REV CODE 636 W HCPCS: Performed by: UROLOGY

## 2024-10-09 PROCEDURE — 80074 ACUTE HEPATITIS PANEL: CPT | Performed by: INTERNAL MEDICINE

## 2024-10-09 PROCEDURE — 36000707: Performed by: UROLOGY

## 2024-10-09 PROCEDURE — 93010 ELECTROCARDIOGRAM REPORT: CPT | Mod: ,,, | Performed by: STUDENT IN AN ORGANIZED HEALTH CARE EDUCATION/TRAINING PROGRAM

## 2024-10-09 PROCEDURE — 82746 ASSAY OF FOLIC ACID SERUM: CPT | Performed by: INTERNAL MEDICINE

## 2024-10-09 PROCEDURE — 25000003 PHARM REV CODE 250: Performed by: INTERNAL MEDICINE

## 2024-10-09 PROCEDURE — 83690 ASSAY OF LIPASE: CPT | Performed by: EMERGENCY MEDICINE

## 2024-10-09 PROCEDURE — 27200651 HC AIRWAY, LMA: Performed by: ANESTHESIOLOGY

## 2024-10-09 PROCEDURE — 36000706: Performed by: UROLOGY

## 2024-10-09 PROCEDURE — 82607 VITAMIN B-12: CPT | Performed by: INTERNAL MEDICINE

## 2024-10-09 PROCEDURE — 63600175 PHARM REV CODE 636 W HCPCS: Performed by: INTERNAL MEDICINE

## 2024-10-09 PROCEDURE — 85610 PROTHROMBIN TIME: CPT | Performed by: INTERNAL MEDICINE

## 2024-10-09 PROCEDURE — 96361 HYDRATE IV INFUSION ADD-ON: CPT

## 2024-10-09 PROCEDURE — 83036 HEMOGLOBIN GLYCOSYLATED A1C: CPT | Performed by: INTERNAL MEDICINE

## 2024-10-09 PROCEDURE — 96372 THER/PROPH/DIAG INJ SC/IM: CPT | Performed by: UROLOGY

## 2024-10-09 PROCEDURE — 80053 COMPREHEN METABOLIC PANEL: CPT | Performed by: EMERGENCY MEDICINE

## 2024-10-09 PROCEDURE — 37000008 HC ANESTHESIA 1ST 15 MINUTES: Performed by: UROLOGY

## 2024-10-09 PROCEDURE — 81003 URINALYSIS AUTO W/O SCOPE: CPT | Performed by: EMERGENCY MEDICINE

## 2024-10-09 RX ORDER — TAMSULOSIN HYDROCHLORIDE 0.4 MG/1
0.4 CAPSULE ORAL DAILY
Status: DISCONTINUED | OUTPATIENT
Start: 2024-10-09 | End: 2024-10-10 | Stop reason: HOSPADM

## 2024-10-09 RX ORDER — AMLODIPINE BESYLATE 10 MG/1
10 TABLET ORAL DAILY
Status: DISCONTINUED | OUTPATIENT
Start: 2024-10-09 | End: 2024-10-10 | Stop reason: HOSPADM

## 2024-10-09 RX ORDER — LIDOCAINE HYDROCHLORIDE 10 MG/ML
INJECTION, SOLUTION EPIDURAL; INFILTRATION; INTRACAUDAL; PERINEURAL
Status: DISCONTINUED | OUTPATIENT
Start: 2024-10-09 | End: 2024-10-09

## 2024-10-09 RX ORDER — FENTANYL CITRATE 50 UG/ML
INJECTION, SOLUTION INTRAMUSCULAR; INTRAVENOUS
Status: DISCONTINUED | OUTPATIENT
Start: 2024-10-09 | End: 2024-10-09

## 2024-10-09 RX ORDER — ATORVASTATIN CALCIUM 40 MG/1
20 TABLET, FILM COATED ORAL
COMMUNITY
Start: 2024-04-12

## 2024-10-09 RX ORDER — MORPHINE SULFATE 4 MG/ML
2 INJECTION, SOLUTION INTRAMUSCULAR; INTRAVENOUS EVERY 6 HOURS PRN
Status: DISCONTINUED | OUTPATIENT
Start: 2024-10-09 | End: 2024-10-10 | Stop reason: HOSPADM

## 2024-10-09 RX ORDER — OMEPRAZOLE 20 MG/1
CAPSULE, DELAYED RELEASE ORAL
COMMUNITY
End: 2024-10-09

## 2024-10-09 RX ORDER — HYDRALAZINE HYDROCHLORIDE 20 MG/ML
10 INJECTION INTRAMUSCULAR; INTRAVENOUS EVERY 6 HOURS PRN
Status: DISCONTINUED | OUTPATIENT
Start: 2024-10-09 | End: 2024-10-10 | Stop reason: HOSPADM

## 2024-10-09 RX ORDER — CHOLECALCIFEROL (VITAMIN D3) 50 MCG
50 TABLET ORAL
COMMUNITY
Start: 2024-04-12

## 2024-10-09 RX ORDER — IBUPROFEN 200 MG
24 TABLET ORAL
Status: DISCONTINUED | OUTPATIENT
Start: 2024-10-09 | End: 2024-10-10 | Stop reason: HOSPADM

## 2024-10-09 RX ORDER — ONDANSETRON HYDROCHLORIDE 2 MG/ML
INJECTION, SOLUTION INTRAVENOUS
Status: DISCONTINUED | OUTPATIENT
Start: 2024-10-09 | End: 2024-10-09

## 2024-10-09 RX ORDER — CHLORDIAZEPOXIDE HYDROCHLORIDE 25 MG/1
25 CAPSULE, GELATIN COATED ORAL 4 TIMES DAILY PRN
Status: DISCONTINUED | OUTPATIENT
Start: 2024-10-09 | End: 2024-10-10 | Stop reason: HOSPADM

## 2024-10-09 RX ORDER — FOLIC ACID 1 MG/1
1 TABLET ORAL DAILY
Status: DISCONTINUED | OUTPATIENT
Start: 2024-10-09 | End: 2024-10-10 | Stop reason: HOSPADM

## 2024-10-09 RX ORDER — METFORMIN HYDROCHLORIDE 1000 MG/1
1000 TABLET ORAL
COMMUNITY
Start: 2024-04-12

## 2024-10-09 RX ORDER — THIAMINE HCL 100 MG
100 TABLET ORAL DAILY
Status: DISCONTINUED | OUTPATIENT
Start: 2024-10-09 | End: 2024-10-10 | Stop reason: HOSPADM

## 2024-10-09 RX ORDER — IBUPROFEN 200 MG
16 TABLET ORAL
Status: DISCONTINUED | OUTPATIENT
Start: 2024-10-09 | End: 2024-10-10 | Stop reason: HOSPADM

## 2024-10-09 RX ORDER — FLECAINIDE ACETATE 50 MG/1
1 TABLET ORAL EVERY 12 HOURS
Status: ON HOLD | COMMUNITY
Start: 2024-07-02 | End: 2024-10-10 | Stop reason: HOSPADM

## 2024-10-09 RX ORDER — SODIUM CHLORIDE 0.9 % (FLUSH) 0.9 %
10 SYRINGE (ML) INJECTION EVERY 12 HOURS PRN
Status: DISCONTINUED | OUTPATIENT
Start: 2024-10-09 | End: 2024-10-10 | Stop reason: HOSPADM

## 2024-10-09 RX ORDER — ACETAMINOPHEN 325 MG/1
650 TABLET ORAL EVERY 8 HOURS PRN
Status: DISCONTINUED | OUTPATIENT
Start: 2024-10-09 | End: 2024-10-10 | Stop reason: HOSPADM

## 2024-10-09 RX ORDER — PROPOFOL 10 MG/ML
VIAL (ML) INTRAVENOUS
Status: DISCONTINUED | OUTPATIENT
Start: 2024-10-09 | End: 2024-10-09

## 2024-10-09 RX ORDER — INSULIN ASPART 100 [IU]/ML
0-10 INJECTION, SOLUTION INTRAVENOUS; SUBCUTANEOUS
Status: DISCONTINUED | OUTPATIENT
Start: 2024-10-09 | End: 2024-10-10 | Stop reason: HOSPADM

## 2024-10-09 RX ORDER — LEVOFLOXACIN 500 MG/1
TABLET, FILM COATED ORAL
COMMUNITY
End: 2024-10-09 | Stop reason: SDUPTHER

## 2024-10-09 RX ORDER — MAGNESIUM OXIDE 420 MG/1
420 TABLET ORAL DAILY
COMMUNITY

## 2024-10-09 RX ORDER — GLUCAGON 1 MG
1 KIT INJECTION
Status: DISCONTINUED | OUTPATIENT
Start: 2024-10-09 | End: 2024-10-10 | Stop reason: HOSPADM

## 2024-10-09 RX ORDER — CARVEDILOL 6.25 MG/1
1 TABLET ORAL 2 TIMES DAILY WITH MEALS
Status: ON HOLD | COMMUNITY
Start: 2024-09-09 | End: 2024-10-10 | Stop reason: HOSPADM

## 2024-10-09 RX ORDER — ONDANSETRON HYDROCHLORIDE 2 MG/ML
4 INJECTION, SOLUTION INTRAVENOUS
Status: COMPLETED | OUTPATIENT
Start: 2024-10-09 | End: 2024-10-09

## 2024-10-09 RX ORDER — ATORVASTATIN CALCIUM 10 MG/1
20 TABLET, FILM COATED ORAL NIGHTLY
Status: DISCONTINUED | OUTPATIENT
Start: 2024-10-09 | End: 2024-10-10 | Stop reason: HOSPADM

## 2024-10-09 RX ORDER — LANOLIN ALCOHOL/MO/W.PET/CERES
1 CREAM (GRAM) TOPICAL DAILY
COMMUNITY
Start: 2023-12-12

## 2024-10-09 RX ORDER — FLECAINIDE ACETATE 50 MG/1
50 TABLET ORAL EVERY 12 HOURS
Status: DISCONTINUED | OUTPATIENT
Start: 2024-10-09 | End: 2024-10-10 | Stop reason: HOSPADM

## 2024-10-09 RX ORDER — CARVEDILOL 6.25 MG/1
6.25 TABLET ORAL 2 TIMES DAILY WITH MEALS
Status: DISCONTINUED | OUTPATIENT
Start: 2024-10-09 | End: 2024-10-10 | Stop reason: HOSPADM

## 2024-10-09 RX ORDER — SODIUM CHLORIDE, SODIUM LACTATE, POTASSIUM CHLORIDE, CALCIUM CHLORIDE 600; 310; 30; 20 MG/100ML; MG/100ML; MG/100ML; MG/100ML
INJECTION, SOLUTION INTRAVENOUS CONTINUOUS PRN
Status: DISCONTINUED | OUTPATIENT
Start: 2024-10-09 | End: 2024-10-09

## 2024-10-09 RX ORDER — KETOROLAC TROMETHAMINE 30 MG/ML
30 INJECTION, SOLUTION INTRAMUSCULAR; INTRAVENOUS
Status: COMPLETED | OUTPATIENT
Start: 2024-10-09 | End: 2024-10-09

## 2024-10-09 RX ORDER — LEVOFLOXACIN 5 MG/ML
750 INJECTION, SOLUTION INTRAVENOUS
Status: COMPLETED | OUTPATIENT
Start: 2024-10-09 | End: 2024-10-09

## 2024-10-09 RX ORDER — PROMETHAZINE HYDROCHLORIDE 25 MG/ML
25 INJECTION, SOLUTION INTRAMUSCULAR; INTRAVENOUS EVERY 6 HOURS PRN
Status: DISCONTINUED | OUTPATIENT
Start: 2024-10-09 | End: 2024-10-10 | Stop reason: HOSPADM

## 2024-10-09 RX ORDER — INSULIN GLARGINE-YFGN 100 [IU]/ML
20 INJECTION, SOLUTION SUBCUTANEOUS NIGHTLY
COMMUNITY
Start: 2024-04-12

## 2024-10-09 RX ORDER — NALOXONE HCL 0.4 MG/ML
0.02 VIAL (ML) INJECTION
Status: DISCONTINUED | OUTPATIENT
Start: 2024-10-09 | End: 2024-10-10 | Stop reason: HOSPADM

## 2024-10-09 RX ORDER — INSULIN GLARGINE 100 [IU]/ML
10 INJECTION, SOLUTION SUBCUTANEOUS DAILY
Status: DISCONTINUED | OUTPATIENT
Start: 2024-10-09 | End: 2024-10-09

## 2024-10-09 RX ORDER — LISINOPRIL AND HYDROCHLOROTHIAZIDE 12.5; 2 MG/1; MG/1
2 TABLET ORAL DAILY
COMMUNITY
Start: 2024-04-12

## 2024-10-09 RX ORDER — INSULIN GLARGINE 100 [IU]/ML
20 INJECTION, SOLUTION SUBCUTANEOUS NIGHTLY
Status: DISCONTINUED | OUTPATIENT
Start: 2024-10-09 | End: 2024-10-10 | Stop reason: HOSPADM

## 2024-10-09 RX ORDER — MIDAZOLAM HYDROCHLORIDE 1 MG/ML
INJECTION INTRAMUSCULAR; INTRAVENOUS
Status: DISCONTINUED | OUTPATIENT
Start: 2024-10-09 | End: 2024-10-09

## 2024-10-09 RX ORDER — AMOXICILLIN 500 MG/1
CAPSULE ORAL
COMMUNITY
End: 2024-10-09 | Stop reason: SDUPTHER

## 2024-10-09 RX ORDER — AMLODIPINE BESYLATE 10 MG/1
1 TABLET ORAL DAILY
COMMUNITY
Start: 2024-04-12

## 2024-10-09 RX ORDER — METOPROLOL SUCCINATE 50 MG/1
25 TABLET, EXTENDED RELEASE ORAL
Status: ON HOLD | COMMUNITY
Start: 2024-04-12 | End: 2024-10-10 | Stop reason: HOSPADM

## 2024-10-09 RX ORDER — ATORVASTATIN CALCIUM 10 MG/1
1 TABLET, FILM COATED ORAL DAILY
COMMUNITY
End: 2024-10-09 | Stop reason: SDUPTHER

## 2024-10-09 RX ORDER — SODIUM CHLORIDE 9 MG/ML
INJECTION, SOLUTION INTRAVENOUS CONTINUOUS
Status: DISCONTINUED | OUTPATIENT
Start: 2024-10-09 | End: 2024-10-09

## 2024-10-09 RX ADMIN — FLECAINIDE ACETATE 50 MG: 50 TABLET ORAL at 11:10

## 2024-10-09 RX ADMIN — INSULIN GLARGINE 20 UNITS: 100 INJECTION, SOLUTION SUBCUTANEOUS at 10:10

## 2024-10-09 RX ADMIN — FENTANYL CITRATE 100 MCG: 50 INJECTION, SOLUTION INTRAMUSCULAR; INTRAVENOUS at 04:10

## 2024-10-09 RX ADMIN — ONDANSETRON 4 MG: 2 INJECTION INTRAMUSCULAR; INTRAVENOUS at 12:10

## 2024-10-09 RX ADMIN — LIDOCAINE HYDROCHLORIDE 50 MG: 10 SOLUTION INTRAVENOUS at 04:10

## 2024-10-09 RX ADMIN — PROPOFOL 70 MG: 10 INJECTION, EMULSION INTRAVENOUS at 04:10

## 2024-10-09 RX ADMIN — CEFTRIAXONE 2 G: 2 INJECTION, POWDER, FOR SOLUTION INTRAMUSCULAR; INTRAVENOUS at 06:10

## 2024-10-09 RX ADMIN — MIDAZOLAM HYDROCHLORIDE 2 MG: 1 INJECTION, SOLUTION INTRAMUSCULAR; INTRAVENOUS at 04:10

## 2024-10-09 RX ADMIN — TAMSULOSIN HYDROCHLORIDE 0.4 MG: 0.4 CAPSULE ORAL at 09:10

## 2024-10-09 RX ADMIN — FLECAINIDE ACETATE 50 MG: 50 TABLET ORAL at 10:10

## 2024-10-09 RX ADMIN — SODIUM CHLORIDE, SODIUM LACTATE, POTASSIUM CHLORIDE, AND CALCIUM CHLORIDE: 600; 310; 30; 20 INJECTION, SOLUTION INTRAVENOUS at 04:10

## 2024-10-09 RX ADMIN — AMLODIPINE BESYLATE 10 MG: 10 TABLET ORAL at 08:10

## 2024-10-09 RX ADMIN — ONDANSETRON 4 MG: 2 INJECTION INTRAMUSCULAR; INTRAVENOUS at 05:10

## 2024-10-09 RX ADMIN — CARVEDILOL 6.25 MG: 6.25 TABLET, FILM COATED ORAL at 07:10

## 2024-10-09 RX ADMIN — CARVEDILOL 6.25 MG: 6.25 TABLET, FILM COATED ORAL at 06:10

## 2024-10-09 RX ADMIN — THERA TABS 1 TABLET: TAB at 09:10

## 2024-10-09 RX ADMIN — INSULIN ASPART 6 UNITS: 100 INJECTION, SOLUTION INTRAVENOUS; SUBCUTANEOUS at 06:10

## 2024-10-09 RX ADMIN — LEVOFLOXACIN 750 MG: 750 INJECTION, SOLUTION INTRAVENOUS at 04:10

## 2024-10-09 RX ADMIN — SODIUM CHLORIDE: 9 INJECTION, SOLUTION INTRAVENOUS at 06:10

## 2024-10-09 RX ADMIN — ATORVASTATIN CALCIUM 20 MG: 10 TABLET, FILM COATED ORAL at 10:10

## 2024-10-09 RX ADMIN — INSULIN GLARGINE 10 UNITS: 100 INJECTION, SOLUTION SUBCUTANEOUS at 06:10

## 2024-10-09 RX ADMIN — FOLIC ACID 1 MG: 1 TABLET ORAL at 09:10

## 2024-10-09 RX ADMIN — KETOROLAC TROMETHAMINE 30 MG: 30 INJECTION, SOLUTION INTRAMUSCULAR at 12:10

## 2024-10-09 RX ADMIN — Medication 100 MG: at 09:10

## 2024-10-09 RX ADMIN — SODIUM CHLORIDE 1000 ML: 9 INJECTION, SOLUTION INTRAVENOUS at 12:10

## 2024-10-09 NOTE — PLAN OF CARE
[facility] - Emergency Dept.  Initial Discharge Assessment       Primary Care Provider: No, Primary Doctor    Admission Diagnosis: Bilateral hydronephrosis [N13.30]    Admission Date: 10/9/2024  Expected Discharge Date:     Transition of Care Barriers: (P) None    Payor: [employer] / Plan: [facility] OPTUM / Product Type: Government /     Extended Emergency Contact Information  Primary Emergency Contact: shahid thornton  Mobile Phone: [phone]  Relation: Father   needed? No  Secondary Emergency Contact: Drew Winston   [facility]  Home Phone: [phone]  Mobile Phone: [phone]  Relation: Daughter    Discharge Plan A: (P) Home  Discharge Plan B: (P) Home      [id number] - [place], [place] - [address]  [address]  [place] [address]  Phone: [phone] Fax: [phone]    [facility] - [place], [place] - [address]  [address] [facility] [address]  Phone: [phone] Fax: [phone]    [id number] - [place], [place] - [address] AT [facility]  [address]  [place] [address]  Phone: [phone] Fax: [phone]      Initial Assessment (most recent)       Adult Discharge Assessment - 10/09/24 1354          Discharge Assessment    Assessment Type Discharge Planning Assessment (P)      Confirmed/corrected address, phone number and insurance Yes (P)      Confirmed Demographics Correct on Facesheet (P)      Source of Information patient (P)      Does patient/caregiver understand observation status Yes (P)      Communicated BRANDIE with patient/caregiver No (P)      Reason For Admission Bilateral Hydronephrosis (P)      People in Home parent(s) (P)      Facility Arrived From: Home (P)      Do you expect to return to your current living situation? Yes (P)      Do you have help at home or someone to help you manage your care at home? No (P)      Prior to hospitilization cognitive status:  Alert/Oriented (P)      Current cognitive status: Alert/Oriented (P)      Walking or Climbing Stairs Difficulty no (P)      Dressing/Bathing Difficulty no (P)      Home Accessibility not wheelchair accessible (P)      Home Layout Able to live on 1st floor (P)      Equipment Currently Used at Home none (P)      Readmission within 30 days? No (P)      Patient currently being followed by outpatient case management? No (P)      Do you currently have service(s) that help you manage your care at home? No (P)      Do you take prescription medications? Yes (P)      Do you have prescription coverage? Yes (P)      Coverage [employer] (P)      Do you have any problems affording any of your prescribed medications? No (P)      Is the patient taking medications as prescribed? yes (P)      Who is going to help you get home at discharge? Self (P)      How do you get to doctors appointments? car, drives self (P)      Are you on dialysis? No (P)      Do you take coumadin? No (P)      Discharge Plan A Home (P)      Discharge Plan B Home (P)      DME Needed Upon Discharge  none (P)      Discharge Plan discussed with: Patient (P)      Transition of Care Barriers None (P)         Physical Activity    On average, how many days per week do you engage in moderate to strenuous exercise (like a brisk walk)? 0 days (P)      On average, how many minutes do you engage in exercise at this level? 0 min (P)         Financial Resource Strain    How hard is it for you to pay for the very basics like food, housing, medical care, and heating? Not hard at all (P)         Housing Stability    In the last 12 months, was there a time when you were not able to pay the mortgage or rent on time? No (P)      At any time in the past 12 months, were you homeless or living in a shelter (including now)? No (P)         Transportation Needs    Has the lack of transportation kept you from medical appointments, meetings, work or from getting things needed  for daily living? No (P)         Food Insecurity    Within the past 12 months, you worried that your food would run out before you got the money to buy more. Never true (P)      Within the past 12 months, the food you bought just didn't last and you didn't have money to get more. Never true (P)         Stress    Do you feel stress - tense, restless, nervous, or anxious, or unable to sleep at night because your mind is troubled all the time - these days? Not at all (P)         Social Isolation    How often do you feel lonely or isolated from those around you?  Never (P)         Alcohol Use    Q1: How often do you have a drink containing alcohol? Never (P)      Q2: How many drinks containing alcohol do you have on a typical day when you are drinking? Patient does not drink (P)      Q3: How often do you have six or more drinks on one occasion? Never (P)         Utilities    In the past 12 months has the electric, gas, oil, or water company threatened to shut off services in your home? No (P)         Health Literacy    How often do you need to have someone help you when you read instructions, pamphlets, or other written material from your doctor or pharmacy? Never (P)         OTHER    Name(s) of People in Home Milo Lopez- father [phone] (P)                       Sw met with patient at bedside to complete initial assessment. Patient lives with his father, Milo Lopez. Patient does not use any equipment at home. Patient is not on any dialysis. Patient does not have any  services in place. Patient does not receive any services through the Coumadin clinic. [facility]. Patient drives himself to and from his medical appointments.  [facility]. When asked about the [facility] patient denies having any current barriers. No other needs at this time

## 2024-10-09 NOTE — ASSESSMENT & PLAN NOTE
-counseled alcohol cessation  -[facility] protocol in place  -multivitamin, thiamine, and folic acid supplementation once p.o. intake allowed

## 2024-10-09 NOTE — ANESTHESIA PROCEDURE NOTES
Intubation    Date/Time: 10/9/2024 4:56 PM    Performed by: Miguel Gardner CRNA  Authorized by: Castro Suarez II, MD    Intubation:     Induction:  Intravenous    Intubated:  Postinduction    Mask Ventilation:  Not attempted    Attempts:  1    Attempted By:  CRNA    Difficult Airway Encountered?: No      Complications:  None    Airway Device:  Supraglottic airway/LMA    Airway Device Size:  4.0    Style/Cuff Inflation:  Cuffed (inflated to minimal occlusive pressure)    Secured at:  The lips    Placement Verified By:  Capnometry    Complicating Factors:  None    Findings Post-Intubation:  BS equal bilateral and atraumatic/condition of teeth unchanged

## 2024-10-09 NOTE — ASSESSMENT & PLAN NOTE
"Right obstructing UVJ stone with right hydronephrosis, and possible bilateral pyelonephritis right greater than left by CT imaging  -CT renal stone protocol 10/09/2024 with "Obstructive 4 mm stone in the right UVJ with resultant mild/moderate hydronephrosis.  Bilateral infectious/inflammatory perinephric edema, right greater than left."  -white blood cell count 6.29, creatinine 1.3, urinalysis was not consistent with infection, and currently afebrile  -NPO, IV fluids, p.r.n. pain/nausea control  -patient received Levaquin 750 mg IV x1 dose in the emergency department  -will change antibiotics to IV ceftriaxone instead avoid drug drug interactions with flecainide  -strict Is&Os  -urology consult    "

## 2024-10-09 NOTE — ASSESSMENT & PLAN NOTE
CT imaging with cirrhotic appearing liver.  Patient denies any prior history/diagnosis of cirrhosis.  Patient does drink heavily on weekends but has cut back in the last year.  He now drinks a 6 pack of beer on Saturdays only.  Previously he drank a 12 pack of beer followed by liquor over the weekend.  Patient is morbidly obese which could be a contributing factor as well.  -LFTs and lipase unremarkable, platelet count 142, hemoglobin 14  -check INR, acute hepatitis panel  -Plan for outpatient hepatology referral  -counseled alcohol cessation  -monitor volume status closely in the setting of patient receiving cautious IV fluids with normal saline at 75 mL/hr

## 2024-10-09 NOTE — HPI
59-year-old white man with history of morbid obesity, psoriasis, insulin-dependent diabetes mellitus type 2, hypertension, obstructive sleep apnea with nocturnal CPAP, paroxysmal atrial fibrillation (status post cardioversion 06/27/2023, on Eliquis), hyperlipidemia, alcohol abuse, closed proximal right fibula fracture and right ankle sprain (in 2021) who presents to the emergency department with complaint of continued right flank pain.  His symptoms have been severe since this past Sunday, 10+ out of 10 on the pain scale, associated with nausea, and associated with dysuria and with some urine discoloration (no overt hematuria).  Patient denies any associated vomiting, fevers, chills, diarrhea.  He can not identify any aggravating or alleviating factors.  Patient can not recall if he has ever had prior history of kidney stones.    Patient was seen in the emergency department 10/06/2024 for right flank pain and diagnosed with nonobstructing nephrolithiasis at that time--> he received IV pain medications and IV fluids.  He was discharged to home with maia Ferraro, and scheduled Flomax.  He was referred to Urology for outpatient follow up.

## 2024-10-09 NOTE — ANESTHESIA PREPROCEDURE EVALUATION
10/09/2024  Roland Lopez is a 59 y.o., male.    Patient Active Problem List   Diagnosis    Closed fracture of proximal end of right fibula    Obesity, Class III, BMI 40-49.9 (morbid obesity)    Psoriasis    Moderate ankle sprain, right, initial encounter    Ureteral stone with hydronephrosis    Pyelonephritis    Liver cirrhosis    PAF (paroxysmal atrial fibrillation)    Primary hypertension    Hyperlipidemia    EVELYN on CPAP    Alcohol abuse    Thrombocytopenia    Insulin dependent type 2 diabetes mellitus     Past Surgical History:   Procedure Laterality Date    TRANSESOPHAGEAL ECHOCARDIOGRAM WITH POSSIBLE CARDIOVERSION (SOFYA W/ POSS CARDIOVERSION) N/A 6/27/2023    Procedure: Transesophageal echo (SOFYA) intra-procedure log documentation;  Surgeon: Santy Martinez MD;  Location: [facility] CATH LAB;  Service: Cardiology;  Laterality: N/A;       Pre-op Assessment    I have reviewed the Patient Summary Reports.    I have reviewed the NPO Status.   I have reviewed the Medications.     Review of Systems  Anesthesia Hx:  No problems with previous Anesthesia                Social:  Non-Smoker, Social Alcohol Use       Hematology/Oncology:  Hematology Normal                                     EENT/Dental:  EENT/Dental Normal           Cardiovascular:     Hypertension    Dysrhythmias       hyperlipidemia   ECG has been reviewed. Paroxysmal atrial fibrillation                         Pulmonary:        Sleep Apnea, CPAP                Renal/:  Renal/ Normal    Right ureteral stone             Hepatic/GI:  Hepatic/GI Normal                 Neurological:  Neurology Normal                                      Endocrine:  Diabetes         Morbid Obesity / BMI > 40      Physical Exam  General: Well nourished, Cooperative, Alert and Oriented    Airway:  Mallampati: II   Mouth Opening: Normal  TM Distance: Normal  Tongue:  Normal  Neck ROM: Normal ROM    Dental:  Intact        Anesthesia Plan  Type of Anesthesia, risks & benefits discussed:    Anesthesia Type: Gen Supraglottic Airway, Gen ETT  Intra-op Monitoring Plan: Standard ASA Monitors  Post Op Pain Control Plan: multimodal analgesia  Induction:  IV  Airway Plan: Direct and Video  Informed Consent: Informed consent signed with the Patient and all parties understand the risks and agree with anesthesia plan.  All questions answered.   ASA Score: 3  Day of Surgery Review of History & Physical: H&P Update referred to the surgeon/provider.    Ready For Surgery From Anesthesia Perspective.     .      Chemistry        Component Value Date/Time     10/09/2024 0025    K 4.0 10/09/2024 0025     10/09/2024 0025    CO2 23 10/09/2024 0025    BUN 17 10/09/2024 0025    CREATININE 1.3 10/09/2024 0025     (H) 10/09/2024 0025        Component Value Date/Time    CALCIUM 9.9 10/09/2024 0025    ALKPHOS 66 10/09/2024 0025    AST 16 10/09/2024 0025    ALT 23 10/09/2024 0025    BILITOT 0.7 10/09/2024 0025    ESTGFRAFRICA >60 10/30/2016 1012    EGFRNONAA >60 10/30/2016 1012        Lab Results   Component Value Date    WBC 6.29 10/09/2024    HGB 14.0 10/09/2024    HCT 42.0 10/09/2024    MCV 84 10/09/2024     (L) 10/09/2024       Sinus bradycardia   Abnormal QRS-T angle, consider primary T wave abnormality   Abnormal ECG   When compared with ECG of 09-JUL-2024 13:49,   Vent. rate has decreased BY  31 BPM   The axis Shifted right   Inverted T waves have replaced nonspecific T wave abnormality in Inferior   leads   Nonspecific T wave abnormality now evident in Lateral leads

## 2024-10-09 NOTE — OP NOTE
Date of Procedure: 10/09/2024    Pre-operative Diagnosis: right ureter stone      Post-operative Diagnosis: spontaneously passed right ureteral stone      Surgeon: Shawn Otto M.D.        Specimen: none    Anesthesia: General    Procedures:  1. Cysto with right ureteral catheterization and right retrograde pyelogram with radiologic interpretation      Procedure in Detail:    Patient was brought to the operative suite after informed consent and preoperative antibiotics.  Genitalia prepped and draped sterilely.  A 21 Luxembourger rigid cystoscope was passed through the urethra into the bladder.  A 18 Luxembourger stricture was noted at the bulbar urethra.  I was able to manipulate the 21 Luxembourger scope pass this into the bladder.  Systematic examination revealed no abnormalities ureteral orifices were seen effluxing clear urine a wire was passed into the right distal ureter easily.  This was done under fluoroscopic guidance and no stone was seen.  At this point a 5 Luxembourger open-ended was gently introduced into the ureter and retrograde contrast revealed normal caliber ureter and no evidence of obstruction or filling defect.  There was prompt excretion of the contrast that was injected.  At this point the scope was removed in the patient was awakened taken to recovery in stable condition.    Blood Loss: none    Fluids: Per anesthesia.    Drains: none    Complications: None.

## 2024-10-09 NOTE — ASSESSMENT & PLAN NOTE
Body mass index is 44.64 kg/m². Morbid obesity complicates all aspects of disease management from diagnostic modalities to treatment. Weight loss encouraged and health benefits explained to patient.

## 2024-10-09 NOTE — TREATMENT PLAN
Brief Plan of Care Update    Pt is a 58 YO  male with PMH notable for T2DM, HTN, Afib, EVELYN who was admitted to hospital medicine earlier this AM for evaluation of nephrolithiasis with associated hydronephrosis.     Pt seen prior to ureterscopy. Reports R flank/abd pain is much improved since admission. Denies nausea, vomiting, CP, SOB.     On exam, no CVAT or abd tenderness to palpation.     Labs reviewed: CBC without leukocytosis, Cr 1.3, A1c 11.7, Hyperglycemia     Urology consulted and plan for ureteroscopic stone removal today with Dr. Lerner   Continue IVF and IV Rocephin for now   Monitor UOP and Cr   Once pt is ok for PO intake, will increase Glargine to home 20 units

## 2024-10-09 NOTE — SUBJECTIVE & OBJECTIVE
Past Medical History:   Diagnosis Date    Diabetes mellitus     Hyperlipidemia     Hypertension     EVELYN (obstructive sleep apnea)     Paroxysmal atrial fibrillation        Past Surgical History:   Procedure Laterality Date    TRANSESOPHAGEAL ECHOCARDIOGRAM WITH POSSIBLE CARDIOVERSION (SOFYA W/ POSS CARDIOVERSION) N/A 6/27/2023    Procedure: Transesophageal echo (SOFYA) intra-procedure log documentation;  Surgeon: Santy Martinez MD;  Location: [facility] CATH LAB;  Service: Cardiology;  Laterality: N/A;       Review of patient's allergies indicates:   Allergen Reactions    1 plus 1-f Rash    Sulfa (sulfonamide antibiotics) Rash       No current facility-administered medications on file prior to encounter.     Current Outpatient Medications on File Prior to Encounter   Medication Sig    amLODIPine (NORVASC) 10 MG tablet 10 mg.    amLODIPine (NORVASC) 10 MG tablet Take 1 tablet by mouth once daily.    amoxicillin (AMOXIL) 500 MG capsule amoxicillin 500 mg capsule   Take 2 capsules every 12 hours by oral route for 14 days.    apixaban (ELIQUIS) 5 mg Tab Take 1 tablet (5 mg total) by mouth 2 (two) times daily.    atorvastatin (LIPITOR) 10 MG tablet Take 1 tablet by mouth once daily.    atorvastatin (LIPITOR) 40 MG tablet Take 20 mg by mouth.    calcipotriene (DOVONOX) 0.005 % cream APPLY MODERATE AMOUNT TOPICALLY TWICE A DAY FOR PLAQUE PSORIASIS    carvediloL (COREG) 6.25 MG tablet Take 1 tablet (6.25 mg total) by mouth 2 (two) times daily with meals.    carvediloL (COREG) 6.25 MG tablet Take 1 tablet by mouth 2 (two) times daily with meals.    cholecalciferol, vitamin D3, (VITAMIN D3) 50 mcg (2,000 unit) Tab 50 mcg.    cholecalciferol, vitamin D3, (VITAMIN D3) 50 mcg (2,000 unit) Tab Take 50 mcg by mouth.    cyanocobalamin (VITAMIN B-12) 1000 MCG tablet Take 1 tablet by mouth once daily.    flecainide (TAMBOCOR) 50 MG Tab Take 1 tablet (50 mg total) by mouth every 12 (twelve) hours.    flecainide (TAMBOCOR) 50 MG Tab Take 1  tablet by mouth every 12 (twelve) hours.    HYDROcodone-acetaminophen (NORCO)  mg per tablet Take 1 tablet by mouth every 6 (six) hours as needed.    insulin glargine-yfgn 100 unit/mL Soln Inject into the skin.    levoFLOXacin (LEVAQUIN) 500 MG tablet levofloxacin 500 mg tablet   Take 1 tablet every 24 hours by oral route for 14 days.    lisinopriL-hydrochlorothiazide (PRINZIDE,ZESTORETIC) 20-12.5 mg per tablet Take 2 tablets by mouth once daily.    metFORMIN (GLUCOPHAGE) 1000 MG tablet Take 1,000 mg by mouth.    metoprolol succinate (TOPROL-XL) 50 MG 24 hr tablet Take 25 mg by mouth.    omeprazole (PRILOSEC) 20 MG capsule omeprazole 20 mg capsule,delayed release   Take 1 capsule twice a day by oral route for 14 days.    ondansetron (ZOFRAN) 4 MG tablet Take 1 tablet (4 mg total) by mouth every 6 (six) hours as needed for Nausea.    semaglutide (OZEMPIC) 0.25 mg or 0.5 mg (2 mg/3 mL) pen injector Inject into the skin.    semaglutide (OZEMPIC) 0.25 mg or 0.5 mg(2 mg/1.5 mL) pen injector INJECT SEMAGLUTIDE SUBCUTANEOUSLY AS DIRECTED FOR DIABETES 0.25MG UNDER THE SKIN INJECTION WEEKLY FOR FOUR WEEKS, THEN INCREASE TO 0.5MG INJECTION UNDER SKIN WEEKLY.    tamsulosin (FLOMAX) 0.4 mg Cap Take 1 capsule (0.4 mg total) by mouth once daily.    furosemide (LASIX) 20 MG tablet Take 1 tablet (20 mg total) by mouth once daily.    insulin glargine-yfgn 100 unit/mL Soln INJECT 20 UNITS SUBCUTANEOUSLY AT BEDTIME FOR DIABETES    magnesium oxide 420 mg Tab Take by oral route.    metFORMIN (GLUCOPHAGE) 1000 MG tablet 1,000 mg.     Family History    None       Tobacco Use    Smoking status: Never    Smokeless tobacco: Former     Quit date: 06/2024   Substance and Sexual Activity    Alcohol use: Not Currently     Alcohol/week: 6.0 standard drinks of alcohol     Types: 6 Cans of beer per week     Comment: 6 cans of beer on Saturdays    Drug use: Never    Sexual activity: Yes     Partners: Female     Review of Systems    Constitutional:  Negative for chills and fever.   Respiratory:  Negative for shortness of breath.    Cardiovascular:  Negative for chest pain.   Gastrointestinal:  Positive for nausea. Negative for diarrhea and vomiting.   Genitourinary:  Positive for dysuria and flank pain.   All other systems reviewed and are negative.    Objective:     Vital Signs (Most Recent):  Temp: 98.2 °F (36.8 °C) (10/09/24 0525)  Pulse: 66 (10/09/24 0525)  Resp: 17 (10/09/24 0525)  BP: (!) 150/76 (10/09/24 0525)  SpO2: 95 % (10/09/24 0525) Vital Signs (24h Range):  Temp:  [97.9 °F (36.6 °C)-98.7 °F (37.1 °C)] 98.2 °F (36.8 °C)  Pulse:  [65-77] 66  Resp:  [17-20] 17  SpO2:  [95 %-99 %] 95 %  BP: (148-193)/(66-91) 150/76     Weight: (!) 157.7 kg (347 lb 10.7 oz)  Body mass index is 44.64 kg/m².     Physical Exam  Vitals reviewed.   Constitutional:       General: He is not in acute distress.     Appearance: He is obese. He is not diaphoretic.   HENT:      Nose: Nose normal.   Eyes:      Conjunctiva/sclera: Conjunctivae normal.   Cardiovascular:      Rate and Rhythm: Normal rate.   Pulmonary:      Effort: Pulmonary effort is normal. No respiratory distress.   Abdominal:      General: There is no distension.      Tenderness: There is no abdominal tenderness.   Musculoskeletal:      Comments: Skin changes of venous insufficiency present to BLE   Skin:     General: Skin is warm and dry.   Neurological:      Mental Status: He is alert and oriented to person, place, and time.   Psychiatric:         Mood and Affect: Mood normal.         Behavior: Behavior normal.                Significant Labs: All pertinent labs within the past 24 hours have been reviewed.  Recent Lab Results         10/09/24  0036   10/09/24  0025        Albumin   3.6       ALP   66       ALT   23       Anion Gap   10       Appearance, UA Clear         AST   16       Bacteria, UA None         Baso #   0.03       Basophil %   0.5       Bilirubin (UA) Negative         BILIRUBIN  TOTAL   0.7  Comment: For infants and newborns, interpretation of results should be based  on gestational age, weight and in agreement with clinical  observations.    Premature Infant recommended reference ranges:  Up to 24 hours.............<8.0 mg/dL  Up to 48 hours............<12.0 mg/dL  3-5 days..................<15.0 mg/dL  6-29 days.................<15.0 mg/dL         BUN   17       Calcium   9.9       Chloride   104       CO2   23       Color, UA Yellow         Creatinine   1.3       Differential Method   Automated       eGFR   >60       Eos #   0.1       Eos %   1.0       Glucose   419       Glucose, UA 4+         Gran # (ANC)   5.1       Gran %   81.0       Hematocrit   42.0       Hemoglobin   14.0       Immature Grans (Abs)   0.01  Comment: Mild elevation in immature granulocytes is non specific and   can be seen in a variety of conditions including stress response,   acute inflammation, trauma and pregnancy. Correlation with other   laboratory and clinical findings is essential.         Immature Granulocytes   0.2       Ketones, UA 1+         Leukocyte Esterase, UA Negative         Lipase   6       Lymph #   0.5       Lymph %   8.4       MCH   28.1       MCHC   33.3       MCV   84       Microscopic Comment SEE COMMENT  Comment: Other formed elements not mentioned in the report are not   present in the microscopic examination.            Mono #   0.6       Mono %   8.9       MPV   10.2       NITRITE UA Negative         nRBC   0       Blood, UA Negative         pH, UA 6.0         Platelet Count   142       Potassium   4.0       PROTEIN TOTAL   7.2       Protein, UA Negative  Comment: Recommend a 24 hour urine protein or a urine   protein/creatinine ratio if globulin induced proteinuria is  clinically suspected.           RBC   4.98       RDW   13.4       Sodium   137       Spec Grav UA 1.025         Specimen UA Urine, Clean Catch         UROBILINOGEN UA Negative         WBC   6.29       Yeast, UA None                  Significant Imaging: I have reviewed all pertinent imaging results/findings within the past 24 hours.  CT Renal Stone Study ABD Pelvis WO   Final Result      Obstructive 4 mm stone in the right UVJ with resultant mild/moderate hydronephrosis.  Bilateral infectious/inflammatory perinephric edema, right greater than left.      Other findings above.         Electronically signed by: Jessee Nobles   Date:    10/09/2024   Time:    01:54

## 2024-10-09 NOTE — ASSESSMENT & PLAN NOTE
The likely etiology of thrombocytopenia is infection and liver disease. The patients 3 most recent labs are listed below.  Recent Labs     10/06/24  0922 10/09/24  0025   * 142*     Plan  - Will transfuse if platelet count is <50k (if undergoing surgical procedure or have active bleeding).  - recent HIV negative on 10/06/2024  -check acute hepatitis panel, B12, folate, and INR  -check CBC in a.m.

## 2024-10-09 NOTE — H&P
[facility] - Emergency Dept.  [facility]  History & Physical    Patient Name: Roland Lopez  MRN: [id number]  Patient Class: OP- Observation  Admission Date: 10/9/2024  Attending Physician:  Otilia Ballesteros MD  Primary Care Provider: [facility] Primary [facility]         Patient information was obtained from patient, ER records, and ER physician .     Subjective:     Principal Problem:Ureteral stone with hydronephrosis    Chief Complaint:   Chief Complaint   Patient presents with    Flank Pain     Pt reports right flank pain 10/10 since Sunday morning. Seen in this ER on Sunday and discharged with Flomax, Toradol, and Zofran but reports no relief. Pt is saying his pain is worst now than it was when the symptoms first began. +N/-V/-D.         HPI: 59-year-old white man with history of morbid obesity, psoriasis, insulin-dependent diabetes mellitus type 2, hypertension, obstructive sleep apnea with nocturnal CPAP, paroxysmal atrial fibrillation (status post cardioversion 06/27/2023, on Eliquis), hyperlipidemia, alcohol abuse, closed proximal right fibula fracture and right ankle sprain (in 2021) who presents to the emergency department with complaint of continued right flank pain.  His symptoms have been severe since this past Sunday, 10+ out of 10 on the pain scale, associated with nausea, and associated with dysuria and with some urine discoloration (no overt hematuria).  Patient denies any associated vomiting, fevers, chills, diarrhea.  He can not identify any aggravating or alleviating factors.  Patient can not recall if he has ever had prior history of kidney stones.    Patient was seen in the emergency department 10/06/2024 for right flank pain and diagnosed with nonobstructing nephrolithiasis at that time--> he received IV pain medications and IV fluids.  He was discharged to home with p.r.n. Norco, p.r.n. Zofran, and scheduled Flomax.  He was referred to Urology for outpatient follow up.    Past Medical History:    Diagnosis Date    Diabetes mellitus     Hyperlipidemia     Hypertension     EVELYN (obstructive sleep apnea)     Paroxysmal atrial fibrillation        Past Surgical History:   Procedure Laterality Date    TRANSESOPHAGEAL ECHOCARDIOGRAM WITH POSSIBLE CARDIOVERSION (SOFYA W/ POSS CARDIOVERSION) N/A 6/27/2023    Procedure: Transesophageal echo (SOFYA) intra-procedure log documentation;  Surgeon: Santy Martinez MD;  Location: [facility] CATH LAB;  Service: Cardiology;  Laterality: N/A;       Review of patient's allergies indicates:   Allergen Reactions    1 plus 1-f Rash    Sulfa (sulfonamide antibiotics) Rash       No current facility-administered medications on file prior to encounter.     Current Outpatient Medications on File Prior to Encounter   Medication Sig    amLODIPine (NORVASC) 10 MG tablet 10 mg.    amLODIPine (NORVASC) 10 MG tablet Take 1 tablet by mouth once daily.    amoxicillin (AMOXIL) 500 MG capsule amoxicillin 500 mg capsule   Take 2 capsules every 12 hours by oral route for 14 days.    apixaban (ELIQUIS) 5 mg Tab Take 1 tablet (5 mg total) by mouth 2 (two) times daily.    atorvastatin (LIPITOR) 10 MG tablet Take 1 tablet by mouth once daily.    atorvastatin (LIPITOR) 40 MG tablet Take 20 mg by mouth.    calcipotriene (DOVONOX) 0.005 % cream APPLY MODERATE AMOUNT TOPICALLY TWICE A DAY FOR PLAQUE PSORIASIS    carvediloL (COREG) 6.25 MG tablet Take 1 tablet (6.25 mg total) by mouth 2 (two) times daily with meals.    carvediloL (COREG) 6.25 MG tablet Take 1 tablet by mouth 2 (two) times daily with meals.    cholecalciferol, vitamin D3, (VITAMIN D3) 50 mcg (2,000 unit) Tab 50 mcg.    cholecalciferol, vitamin D3, (VITAMIN D3) 50 mcg (2,000 unit) Tab Take 50 mcg by mouth.    cyanocobalamin (VITAMIN B-12) 1000 MCG tablet Take 1 tablet by mouth once daily.    flecainide (TAMBOCOR) 50 MG Tab Take 1 tablet (50 mg total) by mouth every 12 (twelve) hours.    flecainide (TAMBOCOR) 50 MG Tab Take 1 tablet by mouth every 12  (twelve) hours.    HYDROcodone-acetaminophen (NORCO)  mg per tablet Take 1 tablet by mouth every 6 (six) hours as needed.    insulin glargine-yfgn 100 unit/mL Soln Inject into the skin.    levoFLOXacin (LEVAQUIN) 500 MG tablet levofloxacin 500 mg tablet   Take 1 tablet every 24 hours by oral route for 14 days.    lisinopriL-hydrochlorothiazide (PRINZIDE,ZESTORETIC) 20-12.5 mg per tablet Take 2 tablets by mouth once daily.    metFORMIN (GLUCOPHAGE) 1000 MG tablet Take 1,000 mg by mouth.    metoprolol succinate (TOPROL-XL) 50 MG 24 hr tablet Take 25 mg by mouth.    omeprazole (PRILOSEC) 20 MG capsule omeprazole 20 mg capsule,delayed release   Take 1 capsule twice a day by oral route for 14 days.    ondansetron (ZOFRAN) 4 MG tablet Take 1 tablet (4 mg total) by mouth every 6 (six) hours as needed for Nausea.    semaglutide (OZEMPIC) 0.25 mg or 0.5 mg (2 mg/3 mL) pen injector Inject into the skin.    semaglutide (OZEMPIC) 0.25 mg or 0.5 mg(2 mg/1.5 mL) pen injector INJECT SEMAGLUTIDE SUBCUTANEOUSLY AS DIRECTED FOR DIABETES 0.25MG UNDER THE SKIN INJECTION WEEKLY FOR FOUR WEEKS, THEN INCREASE TO 0.5MG INJECTION UNDER SKIN WEEKLY.    tamsulosin (FLOMAX) 0.4 mg Cap Take 1 capsule (0.4 mg total) by mouth once daily.    furosemide (LASIX) 20 MG tablet Take 1 tablet (20 mg total) by mouth once daily.    insulin glargine-yfgn 100 unit/mL Soln INJECT 20 UNITS SUBCUTANEOUSLY AT BEDTIME FOR DIABETES    magnesium oxide 420 mg Tab Take by oral route.    metFORMIN (GLUCOPHAGE) 1000 MG tablet 1,000 mg.     Family History    None       Tobacco Use    Smoking status: Never    Smokeless tobacco: Former     Quit date: 06/2024   Substance and Sexual Activity    Alcohol use: Not Currently     Alcohol/week: 6.0 standard drinks of alcohol     Types: 6 Cans of beer per week     Comment: 6 cans of beer on Saturdays    Drug use: Never    Sexual activity: Yes     Partners: Female     Review of Systems   Constitutional:  Negative for chills  and fever.   Respiratory:  Negative for shortness of breath.    Cardiovascular:  Negative for chest pain.   Gastrointestinal:  Positive for nausea. Negative for diarrhea and vomiting.   Genitourinary:  Positive for dysuria and flank pain.   All other systems reviewed and are negative.    Objective:     Vital Signs (Most Recent):  Temp: 98.2 °F (36.8 °C) (10/09/24 0525)  Pulse: 66 (10/09/24 0525)  Resp: 17 (10/09/24 0525)  BP: (!) 150/76 (10/09/24 0525)  SpO2: 95 % (10/09/24 0525) Vital Signs (24h Range):  Temp:  [97.9 °F (36.6 °C)-98.7 °F (37.1 °C)] 98.2 °F (36.8 °C)  Pulse:  [65-77] 66  Resp:  [17-20] 17  SpO2:  [95 %-99 %] 95 %  BP: (148-193)/(66-91) 150/76     Weight: (!) 157.7 kg (347 lb 10.7 oz)  Body mass index is 44.64 kg/m².     Physical Exam  Vitals reviewed.   Constitutional:       General: He is not in acute distress.     Appearance: He is obese. He is not diaphoretic.   HENT:      Nose: Nose normal.   Eyes:      Conjunctiva/sclera: Conjunctivae normal.   Cardiovascular:      Rate and Rhythm: Normal rate.   Pulmonary:      Effort: Pulmonary effort is normal. No respiratory distress.   Abdominal:      General: There is no distension.      Tenderness: There is no abdominal tenderness.   Musculoskeletal:      Comments: Skin changes of venous insufficiency present to BLE   Skin:     General: Skin is warm and dry.   Neurological:      Mental Status: He is alert and oriented to person, place, and time.   Psychiatric:         Mood and Affect: Mood normal.         Behavior: Behavior normal.                Significant Labs: All pertinent labs within the past 24 hours have been reviewed.  Recent Lab Results         10/09/24  0036   10/09/24  0025        Albumin   3.6       ALP   66       ALT   23       Anion Gap   10       Appearance, UA Clear         AST   16       Bacteria, UA None         Baso #   0.03       Basophil %   0.5       Bilirubin (UA) Negative         BILIRUBIN TOTAL   0.7  Comment: For infants and  newborns, interpretation of results should be based  on gestational age, weight and in agreement with clinical  observations.    Premature Infant recommended reference ranges:  Up to 24 hours.............<8.0 mg/dL  Up to 48 hours............<12.0 mg/dL  3-5 days..................<15.0 mg/dL  6-29 days.................<15.0 mg/dL         BUN   17       Calcium   9.9       Chloride   104       CO2   23       Color, UA Yellow         Creatinine   1.3       Differential Method   Automated       eGFR   >60       Eos #   0.1       Eos %   1.0       Glucose   419       Glucose, UA 4+         Gran # (ANC)   5.1       Gran %   81.0       Hematocrit   42.0       Hemoglobin   14.0       Immature Grans (Abs)   0.01  Comment: Mild elevation in immature granulocytes is non specific and   can be seen in a variety of conditions including stress response,   acute inflammation, trauma and pregnancy. Correlation with other   laboratory and clinical findings is essential.         Immature Granulocytes   0.2       Ketones, UA 1+         Leukocyte Esterase, UA Negative         Lipase   6       Lymph #   0.5       Lymph %   8.4       MCH   28.1       MCHC   33.3       MCV   84       Microscopic Comment SEE COMMENT  Comment: Other formed elements not mentioned in the report are not   present in the microscopic examination.            Mono #   0.6       Mono %   8.9       MPV   10.2       NITRITE UA Negative         nRBC   0       Blood, UA Negative         pH, UA 6.0         Platelet Count   142       Potassium   4.0       PROTEIN TOTAL   7.2       Protein, UA Negative  Comment: Recommend a 24 hour urine protein or a urine   protein/creatinine ratio if globulin induced proteinuria is  clinically suspected.           RBC   4.98       RDW   13.4       Sodium   137       Spec Grav UA 1.025         Specimen UA Urine, Clean Catch         UROBILINOGEN UA Negative         WBC   6.29       Yeast, UA None                 Significant Imaging: I  "have reviewed all pertinent imaging results/findings within the past 24 hours.  CT Renal Stone Study ABD Pelvis WO   Final Result      Obstructive 4 mm stone in the right UVJ with resultant mild/moderate hydronephrosis.  Bilateral infectious/inflammatory perinephric edema, right greater than left.      Other findings above.         Electronically signed by: Jessee Nobles   Date:    10/09/2024   Time:    01:54         Assessment/Plan:     * Ureteral stone with hydronephrosis  Right obstructing UVJ stone with right hydronephrosis, and possible bilateral pyelonephritis right greater than left by CT imaging  -CT renal stone protocol 10/09/2024 with "Obstructive 4 mm stone in the right UVJ with resultant mild/moderate hydronephrosis.  Bilateral infectious/inflammatory perinephric edema, right greater than left."  -white blood cell count 6.29, creatinine 1.3, urinalysis was not consistent with infection, and currently afebrile  -NPO, IV fluids, p.r.n. pain/nausea control  -patient received Levaquin 750 mg IV x1 dose in the emergency department  -will change antibiotics to IV ceftriaxone instead avoid drug drug interactions with flecainide  -strict Is&Os  -urology consult      Pyelonephritis  See discussion for ureteral stone with hydronephrosis above.  CT imaging consistent with possible infectious process although white blood cell count is normal and patient is afebrile.  Urinalysis also does not appear to be consistent with infection however patient does complain of dysuria.  Empiric IV antibiotics.      Liver cirrhosis  CT imaging with cirrhotic appearing liver.  Patient denies any prior history/diagnosis of cirrhosis.  Patient does drink heavily on weekends but has cut back in the last year.  He now drinks a 6 pack of beer on Saturdays only.  Previously he drank a 12 pack of beer followed by liquor over the weekend.  Patient is morbidly obese which could be a contributing factor as well.  -LFTs and lipase " "unremarkable, platelet count 142, hemoglobin 14  -check INR, acute hepatitis panel  -Plan for outpatient hepatology referral  -counseled alcohol cessation  -monitor volume status closely in the setting of patient receiving cautious IV fluids with normal saline at 75 mL/hr    PAF (paroxysmal atrial fibrillation)  Patient has paroxysmal (<7 days) atrial fibrillation. [id number] Score: 1. The patients heart rate in the last 24 hours is as follows:  Pulse  Min: 65  Max: 77 .   Continue Coreg when p.o. intake allowed.  Telemetry monitoring.    Antiarrhythmics  , , Oral  , Every 12 hours, Oral  flecainide tablet 50 mg, Every 12 hours, Oral    Anticoagulants   Eliquis held temporarily as patient may require  procedure    Plan  - Replete lytes with a goal of K>4, Mg >2  - Patient is anticoagulated, see medications listed above.  Eliquis is temporarily being held as patient may require  procedure.  - Patient's afib is currently controlled  - check EKG        Insulin dependent type 2 diabetes mellitus  Patient's FSGs are uncontrolled due to hyperglycemia on current medication regimen.  Last A1c reviewed- No results found for: "LABA1C", "HGBA1C"  Most recent fingerstick glucose reviewed- No results for input(s): "POCTGLUCOSE" in the last 24 hours.  Current correctional scale  Medium  Maintain anti-hyperglycemic dose as follows-   Antihyperglycemics (From admission, onward)      Start     Stop Route Frequency Ordered    10/09/24 0650  insulin aspart U-100 pen 0-10 Units         -- SubQ Before meals & nightly PRN 10/09/24 0554    10/09/24 0600  insulin glargine U-100 (Lantus) pen 10 Units         -- SubQ Daily 10/09/24 0554          Hold Oral hypoglycemics while patient is in the hospital.  -hold Ozempic and Glucophage  -Lantus has been decreased from 20 units to 10 units subQ daily while NPO, give 1st dose now in the setting of hyperglycemia  -check A1c  -NPO with IV fluids    Thrombocytopenia  The likely etiology of " thrombocytopenia is infection and liver disease. The patients 3 most recent labs are listed below.  Recent Labs     10/06/24  0922 10/09/24  0025   * 142*     Plan  - Will transfuse if platelet count is <50k (if undergoing surgical procedure or have active bleeding).  - recent HIV negative on 10/06/2024  -check acute hepatitis panel, B12, folate, and INR  -check CBC in a.m.      Alcohol abuse  -counseled alcohol cessation  -[facility] protocol in place  -multivitamin, thiamine, and folic acid supplementation once p.o. intake allowed      EVELYN on CPAP  Nocturnal CPAP.  Pulse ox q.4 hours, p.r.n. oxygen supplementation, and encourage incentive spirometry.      Hyperlipidemia  -plan to continue Lipitor      Primary hypertension  Patients blood pressure range in the last 24 hours was: BP  Min: 148/83  Max: 193/91.The patient's inpatient anti-hypertensive regimen is listed below:  Current Antihypertensives  , , Oral  , Daily, Oral  , 2 times daily with meals, Oral  , Daily, Oral  amLODIPine tablet 10 mg, Daily, Oral  carvediloL tablet 6.25 mg, 2 times daily with meals, Oral  hydrALAZINE injection 10 mg, Every 6 hours PRN, Intravenous    Plan  - BP is controlled, no changes needed to their regimen  - plan to continue Norvasc and Coreg when p.o. intake resumed.  Lisinopril/hydrochlorothiazide has been temporarily held in the acute setting.  P.r.n. IV hydralazine with parameters.    Obesity, Class III, BMI 40-49.9 (morbid obesity)  Body mass index is 44.64 kg/m². Morbid obesity complicates all aspects of disease management from diagnostic modalities to treatment. Weight loss encouraged and health benefits explained to patient.           VTE Risk Mitigation (From admission, onward)           Ordered     Reason for No Pharmacological VTE Prophylaxis  Once        Comments: Eliquis temporarily held, may need  procedure later today   Question:  Reasons:  Answer:  Physician Provided (leave comment)    10/09/24 0554     IP VTE  HIGH RISK PATIENT  Once         10/09/24 0554     Place sequential compression device  Until discontinued         10/09/24 0554                       On 10/09/2024, patient should be placed in hospital observation services under my care.             Otilia Ballesteros MD  Department of Hospital Medicine  [facility] - Emergency Dept.

## 2024-10-09 NOTE — ASSESSMENT & PLAN NOTE
"Patient's FSGs are uncontrolled due to hyperglycemia on current medication regimen.  Last A1c reviewed- No results found for: "LABA1C", "HGBA1C"  Most recent fingerstick glucose reviewed- No results for input(s): "POCTGLUCOSE" in the last 24 hours.  Current correctional scale  Medium  Maintain anti-hyperglycemic dose as follows-   Antihyperglycemics (From admission, onward)      Start     Stop Route Frequency Ordered    10/09/24 0650  insulin aspart U-100 pen 0-10 Units         -- SubQ Before meals & nightly PRN 10/09/24 0554    10/09/24 0600  insulin glargine U-100 (Lantus) pen 10 Units         -- SubQ Daily 10/09/24 0554          Hold Oral hypoglycemics while patient is in the hospital.  -hold Ozempic and Glucophage  -Lantus has been decreased from 20 units to 10 units subQ daily while NPO, give 1st dose now in the setting of hyperglycemia  -check A1c  -NPO with IV fluids  "

## 2024-10-09 NOTE — ASSESSMENT & PLAN NOTE
Nocturnal CPAP.  Pulse ox q.4 hours, p.r.n. oxygen supplementation, and encourage incentive spirometry.

## 2024-10-09 NOTE — ED PROVIDER NOTES
SCRIBE #1 NOTE: IPamela, ximena scribing for, and in the presence of, Darrell Giordano Jr., MD. I have scribed the entire note.       History     Chief Complaint   Patient presents with    Flank Pain     Pt reports right flank pain 10/10 since Sunday morning. Seen in this ER on Sunday and discharged with Flomax, Toradol, and Zofran but reports no relief. Pt is saying his pain is worst now than it was when the symptoms first began. +N/-V/-D.      Review of patient's allergies indicates:   Allergen Reactions    1 plus 1-f Rash    Sulfa (sulfonamide antibiotics) Rash         History of Present Illness     HPI    10/9/2024, 1:50 AM  History obtained from the patient      History of Present Illness: Roland Lopez is a 59 y.o. male patient with a PMHx of HTN who presents to the Emergency Department for evaluation of constant, moderate to severe right flank pain which onset 3 days PTA. No mitigating or exacerbating factors reported. The patient was seen here 3 days ago for this complaint, Dx with right kidney stone, and Rx Flomax as well as Norco and Zofran. He reports no relief with these medications. He states that his pain has worsened. Associated sxs include nausea. Patient denies any fever, vomiting, diarrhea, hematuria, dysuria, and all other sxs at this time. No further complaints or concerns at this time.       Arrival mode: Personal vehicle    PCP: Emilee, Primary Doctor        Past Medical History:  Past Medical History:   Diagnosis Date    Diabetes mellitus     Hyperlipidemia     Hypertension     EVELYN (obstructive sleep apnea)     Paroxysmal atrial fibrillation        Past Surgical History:  Past Surgical History:   Procedure Laterality Date    TRANSESOPHAGEAL ECHOCARDIOGRAM WITH POSSIBLE CARDIOVERSION (SOFYA W/ POSS CARDIOVERSION) N/A 6/27/2023    Procedure: Transesophageal echo (SOFYA) intra-procedure log documentation;  Surgeon: Santy Martinez MD;  Location: [facility] CATH LAB;  Service: Cardiology;  Laterality: N/A;          Family History:  No family history on file.    Social History:  Social History     Tobacco Use    Smoking status: Never    Smokeless tobacco: Former     Quit date: 06/2024   Substance and Sexual Activity    Alcohol use: Not Currently     Alcohol/week: 6.0 standard drinks of alcohol     Types: 6 Cans of beer per week     Comment: 6 cans of beer on Saturdays    Drug use: Never    Sexual activity: Yes     Partners: Female        Review of Systems     Review of Systems   Constitutional:  Negative for fever.   HENT:  Negative for sore throat.    Respiratory:  Negative for shortness of breath.    Cardiovascular:  Negative for chest pain.   Gastrointestinal:  Positive for nausea. Negative for diarrhea and vomiting.   Genitourinary:  Positive for flank pain (right). Negative for dysuria and hematuria.   Musculoskeletal:  Negative for back pain.   Skin:  Negative for rash.   Neurological:  Negative for weakness.   Hematological:  Does not bruise/bleed easily.   All other systems reviewed and are negative.     Physical Exam     Initial Vitals [10/08/24 2314]   BP Pulse Resp Temp SpO2   (!) 193/91 72 18 98.1 °F (36.7 °C) 95 %      MAP       --          Physical Exam  Nursing Notes and Vital Signs Reviewed.  Constitutional: Patient is in no acute distress. Well-developed and well-nourished.  Head: Atraumatic. Normocephalic.  Eyes: PERRL. EOM intact. Conjunctivae are not pale. No scleral icterus.  ENT: Mucous membranes are moist. Oropharynx is clear and symmetric.    Neck: Supple. Full ROM. No lymphadenopathy.  Cardiovascular: Regular rate. Regular rhythm. No murmurs, rubs, or gallops. Distal pulses are 2+ and symmetric.  Pulmonary/Chest: No respiratory distress. Clear to auscultation bilaterally. No wheezing or rales.  Abdominal: Soft and non-distended.  There is right flank tenderness.  No rebound, guarding, or rigidity. Good bowel sounds.  Genitourinary: No CVA tenderness  Musculoskeletal: Moves all extremities. No  "obvious deformities. No edema. No calf tenderness.  Skin: Warm and dry.  Neurological:  Alert, awake, and appropriate.  Normal speech.  No acute focal neurological deficits are appreciated.  Psychiatric: Normal affect. Good eye contact. Appropriate in content.     ED Course   Procedures  ED Vital Signs:  Vitals:    10/09/24 1230 10/09/24 1330 10/09/24 1430 10/09/24 1443   BP: (!) 155/80 (!) 164/88 (!) 169/88 (!) 172/86   Pulse: (!) 58 (!) 59 62 61   Resp: 18 18 18 18   Temp:   98.2 °F (36.8 °C) 97.6 °F (36.4 °C)   TempSrc:   Oral Oral   SpO2: 96% 95% 96% 95%   Weight:    (!) 157.7 kg (347 lb 10.7 oz)   Height:    6' 2" (1.88 m)    10/09/24 1552 10/09/24 1730 10/09/24 1735 10/09/24 1745   BP:  (!) 107/57 112/62 (!) 160/79   Pulse: 74 63 (!) 59 68   Resp:  20 20 20   Temp:  97.7 °F (36.5 °C)     TempSrc:  Temporal     SpO2:  (!) 93% 96% (!) 94%   Weight:       Height:        10/09/24 1910 10/09/24 1923 10/09/24 1949 10/09/24 2213   BP:  (!) 166/84  (!) 178/92   Pulse: 62 (!) 55 (!) 57 64   Resp:  18     Temp:  97.7 °F (36.5 °C)     TempSrc:  Axillary     SpO2:  (!) 93%     Weight:       Height:        10/09/24 2245 10/09/24 2345 10/10/24 0009   BP: (!) 154/73  (!) 146/68   Pulse: (!) 53 (!) 56 (!) 57   Resp: 20  18   Temp:   97.4 °F (36.3 °C)   TempSrc:   Oral   SpO2: (!) 94%  95%   Weight:      Height:          Abnormal Lab Results:  Labs Reviewed   CBC W/ AUTO DIFFERENTIAL - Abnormal       Result Value    WBC 6.29      RBC 4.98      Hemoglobin 14.0      Hematocrit 42.0      MCV 84      MCH 28.1      MCHC 33.3      RDW 13.4      Platelets 142 (*)     MPV 10.2      Immature Granulocytes 0.2      Gran # (ANC) 5.1      Immature Grans (Abs) 0.01      Lymph # 0.5 (*)     Mono # 0.6      Eos # 0.1      Baso # 0.03      nRBC 0      Gran % 81.0 (*)     Lymph % 8.4 (*)     Mono % 8.9      Eosinophil % 1.0      Basophil % 0.5      Differential Method Automated     COMPREHENSIVE METABOLIC PANEL - Abnormal    Sodium 137      " Potassium 4.0      Chloride 104      CO2 23      Glucose 419 (*)     BUN 17      Creatinine 1.3      Calcium 9.9      Total Protein 7.2      Albumin 3.6      Total Bilirubin 0.7      Alkaline Phosphatase 66      AST 16      ALT 23      eGFR >60      Anion Gap 10     URINALYSIS, REFLEX TO URINE CULTURE - Abnormal    Specimen UA Urine, Clean Catch      Color, UA Yellow      Appearance, UA Clear      pH, UA 6.0      Specific Gravity, UA 1.025      Protein, UA Negative      Glucose, UA 4+ (*)     Ketones, UA 1+ (*)     Bilirubin (UA) Negative      Occult Blood UA Negative      Nitrite, UA Negative      Urobilinogen, UA Negative      Leukocytes, UA Negative      Narrative:     Specimen Source->Urine   HEMOGLOBIN A1C - Abnormal    Hemoglobin A1C 11.7 (*)     Estimated Avg Glucose 289 (*)    POCT GLUCOSE - Abnormal    POCT Glucose 390 (*)    LIPASE   LIPASE    Lipase 6     URINALYSIS MICROSCOPIC    Bacteria None      Yeast, UA None      Microscopic Comment SEE COMMENT      Narrative:     Specimen Source->Urine   PROTIME-INR    Prothrombin Time 11.9      INR 1.1     HEPATITIS PANEL, ACUTE    Hepatitis B Surface Ag Non-reactive      Hep B C IgM Non-reactive      Hep A IgM Non-reactive      Hepatitis C Ab Non-reactive     VITAMIN B12    Vitamin B-12 267     FOLATE    Folate 13.3     POCT GLUCOSE MONITORING CONTINUOUS        All Lab Results:  Results for orders placed or performed during the hospital encounter of 10/09/24   CBC auto differential    Collection Time: 10/09/24 12:25 AM   Result Value Ref Range    WBC 6.29 3.90 - 12.70 K/uL    RBC 4.98 4.60 - 6.20 M/uL    Hemoglobin 14.0 14.0 - 18.0 g/dL    Hematocrit 42.0 40.0 - 54.0 %    MCV 84 82 - 98 fL    MCH 28.1 27.0 - 31.0 pg    MCHC 33.3 32.0 - 36.0 g/dL    RDW 13.4 11.5 - 14.5 %    Platelets 142 (L) 150 - 450 K/uL    MPV 10.2 9.2 - 12.9 fL    Immature Granulocytes 0.2 0.0 - 0.5 %    Gran # (ANC) 5.1 1.8 - 7.7 K/uL    Immature Grans (Abs) 0.01 0.00 - 0.04 K/uL    Lymph #  0.5 (L) 1.0 - 4.8 K/uL    Mono # 0.6 0.3 - 1.0 K/uL    Eos # 0.1 0.0 - 0.5 K/uL    Baso # 0.03 0.00 - 0.20 K/uL    nRBC 0 0 /100 WBC    Gran % 81.0 (H) 38.0 - 73.0 %    Lymph % 8.4 (L) 18.0 - 48.0 %    Mono % 8.9 4.0 - 15.0 %    Eosinophil % 1.0 0.0 - 8.0 %    Basophil % 0.5 0.0 - 1.9 %    Differential Method Automated    Comprehensive metabolic panel    Collection Time: 10/09/24 12:25 AM   Result Value Ref Range    Sodium 137 136 - 145 mmol/L    Potassium 4.0 3.5 - 5.1 mmol/L    Chloride 104 95 - 110 mmol/L    CO2 23 23 - 29 mmol/L    Glucose 419 (H) 70 - 110 mg/dL    BUN 17 6 - 20 mg/dL    Creatinine 1.3 0.5 - 1.4 mg/dL    Calcium 9.9 8.7 - 10.5 mg/dL    Total Protein 7.2 6.0 - 8.4 g/dL    Albumin 3.6 3.5 - 5.2 g/dL    Total Bilirubin 0.7 0.1 - 1.0 mg/dL    Alkaline Phosphatase 66 55 - 135 U/L    AST 16 10 - 40 U/L    ALT 23 10 - 44 U/L    eGFR >60 >60 mL/min/1.73 m^2    Anion Gap 10 8 - 16 mmol/L   Lipase    Collection Time: 10/09/24 12:25 AM   Result Value Ref Range    Lipase 6 4 - 60 U/L   Urinalysis, Reflex to Urine Culture Urine, Clean Catch    Collection Time: 10/09/24 12:36 AM    Specimen: Urine   Result Value Ref Range    Specimen UA Urine, Clean Catch     Color, UA Yellow Yellow, Straw, Gemma    Appearance, UA Clear Clear    pH, UA 6.0 5.0 - 8.0    Specific Gravity, UA 1.025 1.005 - 1.030    Protein, UA Negative Negative    Glucose, UA 4+ (A) Negative    Ketones, UA 1+ (A) Negative    Bilirubin (UA) Negative Negative    Occult Blood UA Negative Negative    Nitrite, UA Negative Negative    Urobilinogen, UA Negative <2.0 EU/dL    Leukocytes, UA Negative Negative   Urinalysis Microscopic    Collection Time: 10/09/24 12:36 AM   Result Value Ref Range    Bacteria None None-Occ /hpf    Yeast, UA None None    Microscopic Comment SEE COMMENT    EKG 12-lead    Collection Time: 10/09/24  6:05 AM   Result Value Ref Range    QRS Duration 98 ms    [facility] QTC Calculation 428 ms   Protime-INR    Collection Time: 10/09/24   6:12 AM   Result Value Ref Range    Prothrombin Time 11.9 9.0 - 12.5 sec    INR 1.1 0.8 - 1.2   Hepatitis panel, acute    Collection Time: 10/09/24  6:12 AM   Result Value Ref Range    Hepatitis B Surface Ag Non-reactive Non-reactive    Hep B C IgM Non-reactive Non-reactive    Hep A IgM Non-reactive Non-reactive    Hepatitis C Ab Non-reactive Non-reactive   Hemoglobin A1c    Collection Time: 10/09/24  6:12 AM   Result Value Ref Range    Hemoglobin A1C 11.7 (H) 4.0 - 5.6 %    Estimated Avg Glucose 289 (H) 68 - 131 mg/dL   Vitamin B12    Collection Time: 10/09/24  6:12 AM   Result Value Ref Range    Vitamin B-12 267 210 - 950 pg/mL   Folate    Collection Time: 10/09/24  6:12 AM   Result Value Ref Range    Folate 13.3 4.0 - 24.0 ng/mL   POCT glucose    Collection Time: 10/09/24  6:14 AM   Result Value Ref Range    POCT Glucose 390 (H) 70 - 110 mg/dL   POCT glucose    Collection Time: 10/09/24  5:44 PM   Result Value Ref Range    POCT Glucose 270 (H) 70 - 110 mg/dL   POCT glucose    Collection Time: 10/09/24  6:14 PM   Result Value Ref Range    POCT Glucose 269 (H) 70 - 110 mg/dL         Imaging Results:  Imaging Results              CT Renal Stone Study ABD Pelvis WO (Final result)  Result time 10/09/24 01:54:54      Final result by Jessee Nobles MD (10/09/24 01:54:54)                   Impression:      Obstructive 4 mm stone in the right UVJ with resultant mild/moderate hydronephrosis.  Bilateral infectious/inflammatory perinephric edema, right greater than left.    Other findings above.      Electronically signed by: Jessee Nobles  Date:    10/09/2024  Time:    01:54               Narrative:    EXAMINATION:  CT RENAL STONE STUDY ABD PELVIS WO    CLINICAL HISTORY:  Flank pain, kidney stone suspected;Nephrolithiasis, symptomatic/complicated; Unspecified abdominal pain    TECHNIQUE:  Low dose axial images, sagittal and coronal reformations were obtained from the lung bases to the pubic symphysis.  Contrast  was not administered.    COMPARISON:  10/06/2024    FINDINGS:  Obstructive 4 mm stone in the right UVJ with resultant mild/moderate hydronephrosis.  Bilateral infectious/inflammatory perinephric edema, right greater than left.    Fat containing umbilical hernia.    Cirrhotic liver.  Unremarkable gallbladder.  Unremarkable spleen and pancreas.  Unremarkable adrenal glands.  No small bowel obstruction.  No infectious or inflammatory process involving bowel.    Nonspecific mildly prominent bilateral inguinal and external iliac nodes.                                                The Emergency Provider reviewed the vital signs and test results, which are outlined above.     ED Discussion     4:27 AM: Discussed pt's case with Dr. Lerner (Urology) who agrees with current care and management and plan for admission to . Dr. Lerner will see patient as consult.    4:33 AM: Discussed case with Otilia Ballesteros MD ([facility]). Dr. Ballesteros agrees with current care and management of pt and accepts admission.   Admitting Service: Hospital Medicine  Admitting Physician: Dr. Ballesteros  Admit to: Obs Med Surg    4:33 AM: Re-evaluated pt. I have discussed test results, shared treatment plan, and the need for admission with patient and family at bedside. Pt and family express understanding at this time and agree with all information. All questions answered. Pt and family have no further questions or concerns at this time. Pt is ready for admit.         Medical Decision Making  Amount and/or Complexity of Data Reviewed  Labs: ordered. Decision-making details documented in ED Course.  Radiology: ordered and independent interpretation performed. Decision-making details documented in ED Course.    Risk  OTC drugs.  Prescription drug management.  Parenteral controlled substances.  Drug therapy requiring intensive monitoring for toxicity.  Decision regarding hospitalization.  Emergency major surgery.  Risk Details: OTC drugs,  prescription drugs and controlled substances considered.  Due to patient's symptoms improving and pain controlled pain medications ordered appropriately.  Differential diagnosis;  Gastroenteritis, Bowel obstruction, Colitis, Diverticulitis, Cholecystitis, Appendicitis, Perforated bowel, Herniation, Infectious etiology, UTI, Pyelonephritis,  Biliary obstruction, kidney stone                   ED Medication(s):  Medications   amLODIPine tablet 10 mg (10 mg Oral Given 10/9/24 0800)   atorvastatin tablet 20 mg (20 mg Oral Given 10/9/24 2219)   carvediloL tablet 6.25 mg (6.25 mg Oral Given 10/9/24 1815)   flecainide tablet 50 mg (50 mg Oral Given 10/9/24 2219)   tamsulosin 24 hr capsule 0.4 mg (0.4 mg Oral Given 10/9/24 0900)   sodium chloride 0.9% flush 10 mL (has no administration in time range)   naloxone 0.4 mg/mL injection 0.02 mg (has no administration in time range)   glucose chewable tablet 16 g (has no administration in time range)   glucose chewable tablet 24 g (has no administration in time range)   glucagon (human recombinant) injection 1 mg (has no administration in time range)   insulin aspart U-100 pen 0-10 Units (6 Units Subcutaneous Given 10/9/24 1815)   dextrose 10% bolus 125 mL 125 mL (has no administration in time range)   dextrose 10% bolus 250 mL 250 mL (has no administration in time range)   cefTRIAXone (ROCEPHIN) 2 g in D5W 100 mL IVPB (MB+) (0 g Intravenous Stopped 10/9/24 0718)   acetaminophen tablet 650 mg (has no administration in time range)   promethazine injection 25 mg (has no administration in time range)   morphine injection 2 mg (has no administration in time range)   hydrALAZINE injection 10 mg (has no administration in time range)   chlordiazepoxide capsule 25 mg (has no administration in time range)   multivitamin tablet (1 tablet Oral Given 10/9/24 0900)   folic acid tablet 1 mg (1 mg Oral Given 10/9/24 0900)   thiamine tablet 100 mg (100 mg Oral Given 10/9/24 0900)   insulin  glargine U-100 (Lantus) pen 20 Units (20 Units Subcutaneous Given 10/9/24 2218)   sodium chloride 0.9% bolus 1,000 mL 1,000 mL (0 mLs Intravenous Stopped 10/9/24 0139)   ketorolac injection 30 mg (30 mg Intravenous Given 10/9/24 0046)   ondansetron injection 4 mg (4 mg Intravenous Given 10/9/24 0045)   levoFLOXacin 750 mg/150 mL IVPB 750 mg (0 mg Intravenous Stopped 10/9/24 0609)       Current Discharge Medication List                  Scribe Attestation:   Scribe #1: I performed the above scribed service and the documentation accurately describes the services I performed. I attest to the accuracy of the note.     Attending:   Physician Attestation Statement for Scribe #1: I, Darrell Giordano Jr., MD, personally performed the services described in this documentation, as scribed by Pamela Harman, in my presence, and it is both accurate and complete.           Clinical Impression       ICD-10-CM ICD-9-CM   1. Bilateral hydronephrosis  N13.30 591   2. Right flank pain  R10.9 789.09   3. Renal colic on right side  N23 788.0   4. Renal calculi  N20.0 592.0   5. Chest pain  R07.9 786.50   6. Preop cardiovascular exam  Z01.810 V72.81   7. Right ureteral stone  N20.1 592.1   8. Ureteral stone with hydronephrosis  N13.2 592.1     591       Disposition:   Disposition: Placed in Observation  Condition: Darrell Nascimento Jr., MD  10/10/24 6692

## 2024-10-09 NOTE — ASSESSMENT & PLAN NOTE
See discussion for ureteral stone with hydronephrosis above.  CT imaging consistent with possible infectious process although white blood cell count is normal and patient is afebrile.  Urinalysis also does not appear to be consistent with infection however patient does complain of dysuria.  Empiric IV antibiotics.

## 2024-10-09 NOTE — ASSESSMENT & PLAN NOTE
Patient has paroxysmal (<7 days) atrial fibrillation. [id number] Score: 1. The patients heart rate in the last 24 hours is as follows:  Pulse  Min: 65  Max: 77 .   Continue Coreg when p.o. intake allowed.  Telemetry monitoring.    Antiarrhythmics  , , Oral  , Every 12 hours, Oral  flecainide tablet 50 mg, Every 12 hours, Oral    Anticoagulants   Eliquis held temporarily as patient may require  procedure    Plan  - Replete lytes with a goal of K>4, Mg >2  - Patient is anticoagulated, see medications listed above.  Eliquis is temporarily being held as patient may require  procedure.  - Patient's afib is currently controlled  - check EKG

## 2024-10-09 NOTE — CONSULTS
Chief Complaint:  Right ureteral stent    HPI:   Roland Lopez is a 59 y.o. male that presents today for evaluation of right ureteral stone.  Patient presented this past Sunday with 10/10 right-sided flank pain, CT was obtained which showed a 3 mm right distal ureteral stone.  His pain was controlled and was discharged home.  He did well until last night when he began having worsening pain.  He took a hydrocodone but it did not effectively control his pain.  He presented today, repeat CT scan showed a 4 mm right UVJ stone with upstream hydronephrosis.  Urine shows 4+ glucose and positive ketones, no other concerning findings.  White count 6, creatinine 1.3. First stone episode, no prior urologic procedures.    PMH:  Past Medical History:   Diagnosis Date    Diabetes mellitus     Hyperlipidemia     Hypertension     EVELYN (obstructive sleep apnea)     Paroxysmal atrial fibrillation        PSH:  Past Surgical History:   Procedure Laterality Date    TRANSESOPHAGEAL ECHOCARDIOGRAM WITH POSSIBLE CARDIOVERSION (SOFYA W/ POSS CARDIOVERSION) N/A 6/27/2023    Procedure: Transesophageal echo (SOFYA) intra-procedure log documentation;  Surgeon: Santy Martinez MD;  Location: [facility] CATH LAB;  Service: Cardiology;  Laterality: N/A;       Family History:  No family history on file.    Social History:  Social History     Tobacco Use    Smoking status: Never    Smokeless tobacco: Former     Quit date: 06/2024   Substance Use Topics    Alcohol use: Not Currently     Alcohol/week: 6.0 standard drinks of alcohol     Types: 6 Cans of beer per week     Comment: 6 cans of beer on Saturdays    Drug use: Never        Review of Systems:  General: No fever, chills  Skin: No rashes  Chest:  Denies cough and sputum production  Heart: Denies chest pain  Resp: Denies dyspnea  Abdomen: Denies diarrhea, abdominal pain, hematemesis, or blood in stool.  Musculoskeletal: No joint stiffness or swelling. Denies back pain.  : see HPI  Neuro: no dizziness  or weakness    Allergies:  1 plus 1-f and Sulfa (sulfonamide antibiotics)    Medications:    Current Facility-Administered Medications:     0.9%  NaCl infusion, , Intravenous, Continuous, Otilia Ballesteros MD, Last Rate: 75 mL/hr at 10/09/24 0639, New Bag at 10/09/24 0639    acetaminophen tablet 650 mg, 650 mg, Oral, Q8H PRN, Otilia Ballesteros MD    amLODIPine tablet 10 mg, 10 mg, Oral, Daily, Otilia Ballesteros MD, 10 mg at 10/09/24 0800    atorvastatin tablet 20 mg, 20 mg, Oral, QHS, Otilia Ballesteros MD    carvediloL tablet 6.25 mg, 6.25 mg, Oral, BID WM, Otilia Ballesteros MD, 6.25 mg at 10/09/24 0745    cefTRIAXone (ROCEPHIN) 2 g in D5W 100 mL IVPB (MB+), 2 g, Intravenous, Q24H, Otilia Ballesteros MD, Stopped at 10/09/24 0718    chlordiazepoxide capsule 25 mg, 25 mg, Oral, QID PRN, Otilia Ballesteros MD    dextrose 10% bolus 125 mL 125 mL, 12.5 g, Intravenous, PRN, Otilia Ballesteros MD    dextrose 10% bolus 250 mL 250 mL, 25 g, Intravenous, PRN, Otilia Ballesteros MD    flecainide tablet 50 mg, 50 mg, Oral, Q12H, Otilia Ballesteros MD, 50 mg at 10/09/24 1106    folic acid tablet 1 mg, 1 mg, Oral, Daily, Otilia Ballesteros MD, 1 mg at 10/09/24 0900    glucagon (human recombinant) injection 1 mg, 1 mg, Intramuscular, PRN, Otilia Ballesteros MD    glucose chewable tablet 16 g, 16 g, Oral, PRN, Otilia Ballesteros MD    glucose chewable tablet 24 g, 24 g, Oral, PRN, Otilia Ballesteros MD    hydrALAZINE injection 10 mg, 10 mg, Intravenous, Q6H PRN, Otilia Ballesteros MD    insulin aspart U-100 pen 0-10 Units, 0-10 Units, Subcutaneous, QID (AC + HS) PRN, Otilia Ballesteros MD    insulin glargine U-100 (Lantus) pen 10 Units, 10 Units, Subcutaneous, Daily, Otilia Ballesteros MD, 10 Units at 10/09/24 0620    morphine injection 2 mg, 2 mg, Intravenous, Q6H PRN, Otilia Ballesteros MD    multivitamin tablet, 1 tablet, Oral, Daily, Otilia Ballesteros MD, 1 tablet at 10/09/24 0900    naloxone 0.4 mg/mL injection 0.02 mg, 0.02 mg, Intravenous, PRN, Favian  Otilia CAMPA MD    promethazine injection 25 mg, 25 mg, Intramuscular, Q6H PRN, Otilia Ballesteros MD    sodium chloride 0.9% flush 10 mL, 10 mL, Intravenous, Q12H PRN, Otilia Ballesteros MD    tamsulosin 24 hr capsule 0.4 mg, 0.4 mg, Oral, Daily, Otilia Ballesteros MD, 0.4 mg at 10/09/24 0900    thiamine tablet 100 mg, 100 mg, Oral, Daily, Otilia Ballesteros MD, 100 mg at 10/09/24 0900    Physical Exam:  Vitals:    10/09/24 1443   BP: (!) 172/86   Pulse: 61   Resp: 18   Temp: 97.6 °F (36.4 °C)     Body mass index is 44.64 kg/m².  General: awake, alert, cooperative  Head: NC/AT  Ears: external ears normal  Eyes: sclera normal  Lungs: normal inspiration, NAD  Heart: well-perfused  Skin: The skin is warm and dry  Ext: No c/c/e.  Neuro: grossly intact, AOx3    RADIOLOGY:  CT RENAL STONE STUDY ABD PELVIS WO     CLINICAL HISTORY:  Flank pain, kidney stone suspected;Nephrolithiasis, symptomatic/complicated; Unspecified abdominal pain     TECHNIQUE:  Low dose axial images, sagittal and coronal reformations were obtained from the lung bases to the pubic symphysis.  Contrast was not administered.     COMPARISON:  10/06/2024     FINDINGS:  Obstructive 4 mm stone in the right UVJ with resultant mild/moderate hydronephrosis.  Bilateral infectious/inflammatory perinephric edema, right greater than left.     Fat containing umbilical hernia.     Cirrhotic liver.  Unremarkable gallbladder.  Unremarkable spleen and pancreas.  Unremarkable adrenal glands.  No small bowel obstruction.  No infectious or inflammatory process involving bowel.     Nonspecific mildly prominent bilateral inguinal and external iliac nodes.     Impression:     Obstructive 4 mm stone in the right UVJ with resultant mild/moderate hydronephrosis.  Bilateral infectious/inflammatory perinephric edema, right greater than left.     Other findings above.    LABS:  I personally reviewed the following lab values:  Lab Results   Component Value Date    WBC 6.29 10/09/2024    HGB  14.0 10/09/2024    HCT 42.0 10/09/2024     (L) 10/09/2024     10/09/2024    K 4.0 10/09/2024     10/09/2024    CREATININE 1.3 10/09/2024    BUN 17 10/09/2024    CO2 23 10/09/2024    TSH 3.541 05/22/2023    INR 1.1 10/09/2024    HGBA1C 11.7 (H) 10/09/2024    ALT 23 10/09/2024    AST 16 10/09/2024     Assessment/Plan:   Roland Lopez is a 59 y.o. male with:    Right ureteral stone - to OR for ureteroscopic stone removal, risks/benefits/alternatives reviewed      Thank you for allowing me the opportunity to participate in this patient's care.     Dave Lerner MD  Urology

## 2024-10-09 NOTE — PHARMACY MED REC
"Admission Medication History     The home medication history was taken by Nakul Frye.    You may go to "Admission" then "Reconcile Home Medications" tabs to review and/or act upon these items.     The home medication list has been updated by the Pharmacy department.   Please read ALL comments highlighted in yellow.   Please address this information as you see fit.    Feel free to contact us if you have any questions or require assistance.      Medications listed below were obtained from: [employer]- [employer], Medical records, and Patient's pharmacy: [facility]  (Not in a hospital admission)      Nakul Frye  [id number]    Current Outpatient Medications on File Prior to Encounter   Medication Sig Dispense Refill Last Dose/Taking    amLODIPine (NORVASC) 10 MG tablet Take 1 tablet by mouth once daily.   10/8/2024 Morning    apixaban (ELIQUIS) 5 mg Tab Take 1 tablet (5 mg total) by mouth 2 (two) times daily. 180 tablet 3 10/8/2024 Morning    atorvastatin (LIPITOR) 40 MG tablet Take 20 mg by mouth.   10/8/2024 Morning    calcipotriene (DOVONOX) 0.005 % cream APPLY MODERATE AMOUNT TOPICALLY TWICE A DAY FOR PLAQUE PSORIASIS   10/8/2024 Morning    carvediloL (COREG) 6.25 MG tablet Take 1 tablet (6.25 mg total) by mouth 2 (two) times daily with meals. 180 tablet 3 10/8/2024 Morning    carvediloL (COREG) 6.25 MG tablet Take 1 tablet by mouth 2 (two) times daily with meals.   10/8/2024 Morning    cholecalciferol, vitamin D3, (VITAMIN D3) 50 mcg (2,000 unit) Tab Take 50 mcg by mouth.   10/8/2024 Morning    cyanocobalamin (VITAMIN B-12) 1000 MCG tablet Take 1 tablet by mouth once daily.   10/8/2024 Morning    flecainide (TAMBOCOR) 50 MG Tab Take 1 tablet (50 mg total) by mouth every 12 (twelve) hours. 60 tablet 11 10/8/2024 Morning    flecainide (TAMBOCOR) 50 MG Tab Take 1 tablet by mouth every 12 (twelve) hours.   10/8/2024 Morning    HYDROcodone-acetaminophen (NORCO)  mg per tablet Take 1 tablet by mouth " every 6 (six) hours as needed. 12 tablet 0 10/8/2024 Morning    insulin glargine-yfgn 100 unit/mL Soln Inject 20 Units into the skin every evening.   10/8/2024 Morning    lisinopriL-hydrochlorothiazide (PRINZIDE,ZESTORETIC) 20-12.5 mg per tablet Take 2 tablets by mouth once daily.   10/8/2024 Morning    metFORMIN (GLUCOPHAGE) 1000 MG tablet Take 1,000 mg by mouth.   10/8/2024 Morning    metoprolol succinate (TOPROL-XL) 50 MG 24 hr tablet Take 25 mg by mouth.   Past Week    ondansetron (ZOFRAN) 4 MG tablet Take 1 tablet (4 mg total) by mouth every 6 (six) hours as needed for Nausea. 12 tablet 0 10/8/2024 Morning    semaglutide (OZEMPIC) 0.25 mg or 0.5 mg (2 mg/3 mL) pen injector Inject into the skin.   Past Week    tamsulosin (FLOMAX) 0.4 mg Cap Take 1 capsule (0.4 mg total) by mouth once daily. 30 capsule 0 10/8/2024 Morning    [DISCONTINUED] amLODIPine (NORVASC) 10 MG tablet 10 mg.   10/8/2024 Morning    [DISCONTINUED] amoxicillin (AMOXIL) 500 MG capsule amoxicillin 500 mg capsule   Take 2 capsules every 12 hours by oral route for 14 days.   10/8/2024 Morning    [DISCONTINUED] atorvastatin (LIPITOR) 10 MG tablet Take 1 tablet by mouth once daily.   10/8/2024 Morning    [DISCONTINUED] cholecalciferol, vitamin D3, (VITAMIN D3) 50 mcg (2,000 unit) Tab 50 mcg.   10/8/2024 Morning    [DISCONTINUED] levoFLOXacin (LEVAQUIN) 500 MG tablet levofloxacin 500 mg tablet   Take 1 tablet every 24 hours by oral route for 14 days.   Past Week    [DISCONTINUED] omeprazole (PRILOSEC) 20 MG capsule omeprazole 20 mg capsule,delayed release   Take 1 capsule twice a day by oral route for 14 days.   Past Week    [DISCONTINUED] semaglutide (OZEMPIC) 0.25 mg or 0.5 mg(2 mg/1.5 mL) pen injector INJECT SEMAGLUTIDE SUBCUTANEOUSLY AS DIRECTED FOR DIABETES 0.25MG UNDER THE SKIN INJECTION WEEKLY FOR FOUR WEEKS, THEN INCREASE TO 0.5MG INJECTION UNDER SKIN WEEKLY.   Past Week    magnesium oxide 420 mg Tab Take 420 mg by mouth once daily.         .

## 2024-10-09 NOTE — ASSESSMENT & PLAN NOTE
Patients blood pressure range in the last 24 hours was: BP  Min: 148/83  Max: 193/91.The patient's inpatient anti-hypertensive regimen is listed below:  Current Antihypertensives  , , Oral  , Daily, Oral  , 2 times daily with meals, Oral  , Daily, Oral  amLODIPine tablet 10 mg, Daily, Oral  carvediloL tablet 6.25 mg, 2 times daily with meals, Oral  hydrALAZINE injection 10 mg, Every 6 hours PRN, Intravenous    Plan  - BP is controlled, no changes needed to their regimen  - plan to continue Norvasc and Coreg when p.o. intake resumed.  Lisinopril/hydrochlorothiazide has been temporarily held in the acute setting.  P.r.n. IV hydralazine with parameters.

## 2024-10-10 VITALS
OXYGEN SATURATION: 97 % | HEART RATE: 65 BPM | WEIGHT: 315 LBS | TEMPERATURE: 98 F | HEIGHT: 74 IN | RESPIRATION RATE: 17 BRPM | BODY MASS INDEX: 40.43 KG/M2 | SYSTOLIC BLOOD PRESSURE: 165 MMHG | DIASTOLIC BLOOD PRESSURE: 90 MMHG

## 2024-10-10 LAB
ALBUMIN SERPL BCP-MCNC: 3.1 G/DL (ref 3.5–5.2)
ALP SERPL-CCNC: 57 U/L (ref 55–135)
ALT SERPL W/O P-5'-P-CCNC: 19 U/L (ref 10–44)
ANION GAP SERPL CALC-SCNC: 8 MMOL/L (ref 8–16)
AST SERPL-CCNC: 16 U/L (ref 10–40)
BASOPHILS # BLD AUTO: 0.02 K/UL (ref 0–0.2)
BASOPHILS NFR BLD: 0.5 % (ref 0–1.9)
BILIRUB SERPL-MCNC: 0.8 MG/DL (ref 0.1–1)
BUN SERPL-MCNC: 11 MG/DL (ref 6–20)
CALCIUM SERPL-MCNC: 9 MG/DL (ref 8.7–10.5)
CHLORIDE SERPL-SCNC: 105 MMOL/L (ref 95–110)
CO2 SERPL-SCNC: 27 MMOL/L (ref 23–29)
CREAT SERPL-MCNC: 0.8 MG/DL (ref 0.5–1.4)
DIFFERENTIAL METHOD BLD: ABNORMAL
EOSINOPHIL # BLD AUTO: 0.1 K/UL (ref 0–0.5)
EOSINOPHIL NFR BLD: 2 % (ref 0–8)
ERYTHROCYTE [DISTWIDTH] IN BLOOD BY AUTOMATED COUNT: 13.4 % (ref 11.5–14.5)
EST. GFR  (NO RACE VARIABLE): >60 ML/MIN/1.73 M^2
GLUCOSE SERPL-MCNC: 241 MG/DL (ref 70–110)
HCT VFR BLD AUTO: 41 % (ref 40–54)
HGB BLD-MCNC: 13.2 G/DL (ref 14–18)
IMM GRANULOCYTES # BLD AUTO: 0.01 K/UL (ref 0–0.04)
IMM GRANULOCYTES NFR BLD AUTO: 0.3 % (ref 0–0.5)
LYMPHOCYTES # BLD AUTO: 0.8 K/UL (ref 1–4.8)
LYMPHOCYTES NFR BLD: 20.9 % (ref 18–48)
MCH RBC QN AUTO: 27.5 PG (ref 27–31)
MCHC RBC AUTO-ENTMCNC: 32.2 G/DL (ref 32–36)
MCV RBC AUTO: 85 FL (ref 82–98)
MONOCYTES # BLD AUTO: 0.4 K/UL (ref 0.3–1)
MONOCYTES NFR BLD: 8.8 % (ref 4–15)
NEUTROPHILS # BLD AUTO: 2.7 K/UL (ref 1.8–7.7)
NEUTROPHILS NFR BLD: 67.5 % (ref 38–73)
NRBC BLD-RTO: 0 /100 WBC
OHS QRS DURATION: 98 MS
OHS QTC CALCULATION: 428 MS
PLATELET # BLD AUTO: 123 K/UL (ref 150–450)
PMV BLD AUTO: 9.8 FL (ref 9.2–12.9)
POCT GLUCOSE: 245 MG/DL (ref 70–110)
POCT GLUCOSE: 299 MG/DL (ref 70–110)
POTASSIUM SERPL-SCNC: 3.5 MMOL/L (ref 3.5–5.1)
PROT SERPL-MCNC: 6.4 G/DL (ref 6–8.4)
RBC # BLD AUTO: 4.8 M/UL (ref 4.6–6.2)
SODIUM SERPL-SCNC: 140 MMOL/L (ref 136–145)
WBC # BLD AUTO: 3.97 K/UL (ref 3.9–12.7)

## 2024-10-10 PROCEDURE — 36415 COLL VENOUS BLD VENIPUNCTURE: CPT | Performed by: INTERNAL MEDICINE

## 2024-10-10 PROCEDURE — 85025 COMPLETE CBC W/AUTO DIFF WBC: CPT | Performed by: INTERNAL MEDICINE

## 2024-10-10 PROCEDURE — 25000003 PHARM REV CODE 250: Performed by: UROLOGY

## 2024-10-10 PROCEDURE — 80053 COMPREHEN METABOLIC PANEL: CPT | Performed by: INTERNAL MEDICINE

## 2024-10-10 PROCEDURE — 94799 UNLISTED PULMONARY SVC/PX: CPT

## 2024-10-10 PROCEDURE — 96366 THER/PROPH/DIAG IV INF ADDON: CPT

## 2024-10-10 PROCEDURE — 63600175 PHARM REV CODE 636 W HCPCS: Performed by: INTERNAL MEDICINE

## 2024-10-10 PROCEDURE — 25000003 PHARM REV CODE 250: Performed by: INTERNAL MEDICINE

## 2024-10-10 PROCEDURE — G0378 HOSPITAL OBSERVATION PER HR: HCPCS

## 2024-10-10 RX ADMIN — TAMSULOSIN HYDROCHLORIDE 0.4 MG: 0.4 CAPSULE ORAL at 09:10

## 2024-10-10 RX ADMIN — CEFTRIAXONE 2 G: 2 INJECTION, POWDER, FOR SOLUTION INTRAMUSCULAR; INTRAVENOUS at 05:10

## 2024-10-10 RX ADMIN — CARVEDILOL 6.25 MG: 6.25 TABLET, FILM COATED ORAL at 09:10

## 2024-10-10 RX ADMIN — THERA TABS 1 TABLET: TAB at 09:10

## 2024-10-10 RX ADMIN — FLECAINIDE ACETATE 50 MG: 50 TABLET ORAL at 09:10

## 2024-10-10 RX ADMIN — FOLIC ACID 1 MG: 1 TABLET ORAL at 09:10

## 2024-10-10 RX ADMIN — AMLODIPINE BESYLATE 10 MG: 10 TABLET ORAL at 09:10

## 2024-10-10 RX ADMIN — Medication 100 MG: at 09:10

## 2024-10-10 NOTE — ANESTHESIA POSTPROCEDURE EVALUATION
Anesthesia Post Evaluation    Patient: Roland Lopez    Procedure(s) Performed: Procedure(s) (LRB):  CYSTOSCOPY, WITH RETROGRADE PYELOGRAM (Right)    Final Anesthesia Type: general      Patient location during evaluation: PACU  Patient participation: Yes- Able to Participate  Level of consciousness: awake and alert  Post-procedure vital signs: reviewed and stable  Pain management: adequate  Airway patency: patent  EVELYN mitigation strategies: Extubation while patient is awake  PONV status at discharge: No PONV  Anesthetic complications: no      Cardiovascular status: hemodynamically stable  Respiratory status: spontaneous ventilation  Hydration status: euvolemic  Follow-up not needed.              Vitals Value Taken Time   /75 10/10/24 0423   Temp 37 °C (98.6 °F) 10/10/24 0423   Pulse 62 10/10/24 0423   Resp 18 10/10/24 0423   SpO2 92 % 10/10/24 0423         Event Time   Out of Recovery 18:03:15         Pain/Gabby Score: Presence of Pain: denies (10/9/2024  2:30 PM)  Pain Rating Prior to Med Admin: 10 (10/9/2024 12:46 AM)  Pain Rating Post Med Admin: 6 (10/9/2024  1:23 AM)  Gabby Score: 9 (10/9/2024  5:45 PM)

## 2024-10-10 NOTE — HOSPITAL COURSE
Urology was consulted, he underwent cystoscopy with R ureteral catheterization with Dr. Otto 10/10/2024 but pt had spontaneously passed R ureteral stone. Pt deemed stable for DC from urology standpoint. Cr remained improved to 0.8 postop, pt afebrile/HDS. UA not consistent with UTI so antibiotics were discontinued. Pt seen and examined on day of discharge and discharged home in stable condition. [facility].

## 2024-10-10 NOTE — PLAN OF CARE
[facility] - Med Surg  Discharge Final Note    Primary Care Provider: No, Primary Doctor    Expected Discharge Date: 10/10/2024    Final Discharge Note (most recent)       Final Note - 10/10/24 0931          Final Note    Assessment Type Final Discharge Note     Anticipated Discharge Disposition Home or Self Care        Post-Acute Status    Discharge Delays None known at this time                     Contact Info       [facility]    [address] [address]   Phone: [phone]       Next Steps: Schedule an appointment as soon as possible for a visit in 3 day(s)          Patient has no d/c needs at this time. Sw to follow up, as needed, for d/c planning purposes.

## 2024-10-10 NOTE — CONSULTS
Consult received. The medical records were reviewed.     Current admission diagnosis: :Ureteral stone with hydronephrosis               Met with pt and father for diabetes management practices. Reviewed current A1C and target. Reviewed disease process and medical complications associated with uncontrolled diabetes. Encouraged lifestyle management to include weight loss, increased activity, and appropriate diet as a part of diabetes management.    Patient states he has all medication supplies needed at home and takes as ordered. Patient also [facility].     Pt reports having  meter and supplies at home. Patient states he attempts to check his blood sugar level at least once daily. States he has been under stress the last few months also. Patient is also waiting on Ozempic injection to [facility] with the [facility].     Patient states he has been eating a lot more breads lately but is aware he needs to make healthier food options. Encouraged to avoid obvious sources of sugar.     Reviewed the diabetes care checklist and the importance of regular follow up with physician. Left diabetes education packet for reference and contact information. Encouraged pt to contact diabetes education team with any questions or concerns.     Self Management Goals:   Take blood sugar levels at least daily.  Making better food choices.       Recommendation:  [facility] and [facility] of ozempic. Also encouraged patient to keep log of blood sugar levels to bring to his follow up visit.

## 2024-10-10 NOTE — PLAN OF CARE
Discussed poc with pt, pt verbalized understanding    Purposeful rounding every 2hours    VS wnl  Cardiac monitoring in use, pt is NSR, tele monitor #[id number]  Blood glucose monitoring   Fall precautions in place, remains injury free  Pt denies c/o pain    IVFs saline locked  Accurate I&Os    Bed locked at lowest position  Call light within reach    Chart check complete  Will cont with POC

## 2024-10-10 NOTE — DISCHARGE SUMMARY
[facility] Medicine  Discharge Summary      Patient Name: Roland Lopez  MRN: [id number]  [facility]: [id number]  Patient Class: OP- Observation  Admission Date: 10/9/2024  Hospital Length of Stay: 1 days  Discharge Date and Time:  10/10/2024 1:32 PM  Attending Physician: Beckie Evans MD   Discharging Provider: Beckie Evans MD  Primary Care Provider: Emilee, Primary Doctor    Primary Care Team: Networked reference to record PCT     HPI:   59-year-old white man with history of morbid obesity, psoriasis, insulin-dependent diabetes mellitus type 2, hypertension, obstructive sleep apnea with nocturnal CPAP, paroxysmal atrial fibrillation (status post cardioversion 06/27/2023, on Eliquis), hyperlipidemia, alcohol abuse, closed proximal right fibula fracture and right ankle sprain (in 2021) who presents to the emergency department with complaint of continued right flank pain.  His symptoms have been severe since this past Sunday, 10+ out of 10 on the pain scale, associated with nausea, and associated with dysuria and with some urine discoloration (no overt hematuria).  Patient denies any associated vomiting, fevers, chills, diarrhea.  He can not identify any aggravating or alleviating factors.  Patient can not recall if he has ever had prior history of kidney stones.    Patient was seen in the emergency department 10/06/2024 for right flank pain and diagnosed with nonobstructing nephrolithiasis at that time--> he received IV pain medications and IV fluids.  He was discharged to home with p.r.n. Norco, p.r.n. Zofran, and scheduled Flomax.  He was referred to Urology for outpatient follow up.    Procedure(s) (LRB):  CYSTOSCOPY, WITH RETROGRADE PYELOGRAM (Right)      Hospital Course:   Urology was consulted, he underwent cystoscopy with R ureteral catheterization with Dr. Otto 10/10/2024 but pt had spontaneously passed R ureteral stone. Pt deemed stable for DC from urology standpoint. Cr remained improved  to 0.8 postop, pt afebrile/HDS. UA not consistent with UTI so antibiotics were discontinued. Pt seen and examined on day of discharge and discharged home in stable condition. [facility] week.      Goals of Care Treatment Preferences:  Code Status: Full Code      SDOH Screening:  The patient was screened for utility difficulties, food insecurity, transport difficulties, housing insecurity, and interpersonal safety and there were no concerns identified this admission.     Consults:   Consults (From admission, onward)          Status Ordering Provider     Inpatient consult to Diabetes educator  Once        Provider:  (Not yet assigned)    LIZA Francis     Inpatient consult to Urology  Once        Provider:  Dave Lerner MD    Completed WAQAR IRENE JR            No new Assessment & Plan notes have been filed under this hospital service since the last note was generated.  Service: Hospital Medicine    Final Active Diagnoses:    Diagnosis Date Noted POA    PRINCIPAL PROBLEM:  Ureteral stone with hydronephrosis [N13.2] 10/09/2024 Yes    Pyelonephritis [N12] 10/09/2024 Yes    Liver cirrhosis [K74.60] 10/09/2024 Yes    PAF (paroxysmal atrial fibrillation) [I48.0] 10/09/2024 Yes    Primary hypertension [I10] 10/09/2024 Yes    Hyperlipidemia [E78.5] 10/09/2024 Yes    EVELYN on CPAP [G47.33] 10/09/2024 Yes    Alcohol abuse [F10.10] 10/09/2024 Yes    Thrombocytopenia [D69.6] 10/09/2024 Yes    Insulin dependent type 2 diabetes mellitus [E11.9, Z79.4] 10/09/2024 Not Applicable    Right ureteral stone [N20.1] 10/09/2024 Yes    Obesity, Class III, BMI 40-49.9 (morbid obesity) [E66.01] 06/04/2021 Yes      Problems Resolved During this Admission:       Discharged Condition: good    Disposition: Home or Self Care    Follow Up:   Follow-up Information       [facility]. Schedule an appointment as soon as possible for a visit in 3 day(s).    Contact information:  [address]  [facility] [address]  [phone]                           Patient Instructions:      Diet Cardiac     Diet diabetic     Notify your health care provider if you experience any of the following:  severe uncontrolled pain     Notify your health care provider if you experience any of the following:  temperature >100.4     Activity as tolerated       Significant Diagnostic Studies: See Hospital Course     Pending Diagnostic Studies:       None           Medications:  Reconciled Home Medications:      Medication List        CHANGE how you take these medications      carvediloL 6.25 MG tablet  Commonly known as: COREG  Take 1 tablet (6.25 mg total) by mouth 2 (two) times daily with meals.  What changed: Another medication with the same name was removed. Continue taking this medication, and follow the directions you see here.     flecainide 50 MG Tab  Commonly known as: TAMBOCOR  Take 1 tablet (50 mg total) by mouth every 12 (twelve) hours.  What changed: Another medication with the same name was removed. Continue taking this medication, and follow the directions you see here.            CONTINUE taking these medications      amLODIPine 10 MG tablet  Commonly known as: NORVASC  Take 1 tablet by mouth once daily.     apixaban 5 mg Tab  Commonly known as: ELIQUIS  Take 1 tablet (5 mg total) by mouth 2 (two) times daily.     atorvastatin 40 MG tablet  Commonly known as: LIPITOR  Take 20 mg by mouth.     calcipotriene 0.005 % cream  Commonly known as: DOVONOX  APPLY MODERATE AMOUNT TOPICALLY TWICE A DAY FOR PLAQUE PSORIASIS     cholecalciferol (vitamin D3) 50 mcg (2,000 unit) Tab  Commonly known as: VITAMIN D3  Take 50 mcg by mouth.     cyanocobalamin 1000 MCG tablet  Commonly known as: VITAMIN B-12  Take 1 tablet by mouth once daily.     HYDROcodone-acetaminophen  mg per tablet  Commonly known as: NORCO  Take 1 tablet by mouth every 6 (six) hours as needed.     insulin glargine-yfgn 100 unit/mL Soln  Inject 20  Units into the skin every evening.     lisinopriL-hydrochlorothiazide 20-12.5 mg per tablet  Commonly known as: PRINZIDE,ZESTORETIC  Take 2 tablets by mouth once daily.     magnesium oxide 420 mg Tab  Take 420 mg by mouth once daily.     metFORMIN 1000 MG tablet  Commonly known as: GLUCOPHAGE  Take 1,000 mg by mouth.     ondansetron 4 MG tablet  Commonly known as: ZOFRAN  Take 1 tablet (4 mg total) by mouth every 6 (six) hours as needed for Nausea.     semaglutide 0.25 mg or 0.5 mg (2 mg/3 mL) pen injector  Commonly known as: OZEMPIC  Inject into the skin.     tamsulosin 0.4 mg Cap  Commonly known as: FLOMAX  Take 1 capsule (0.4 mg total) by mouth once daily.            STOP taking these medications      metoprolol succinate 50 MG 24 hr tablet  Commonly known as: TOPROL-XL              Indwelling Lines/Drains at time of discharge:   Lines/Drains/Airways       None                   Time spent on the discharge of patient: 25 minutes         Beckie Evans MD  Department of Hospital Medicine  '[place] - Med Surg

## 2025-07-08 ENCOUNTER — HOSPITAL ENCOUNTER (INPATIENT)
Facility: HOSPITAL | Age: 60
LOS: 8 days | Discharge: NURSING, REHAB OR LONG-TERM CARE | DRG: 481 | End: 2025-07-16
Attending: EMERGENCY MEDICINE | Admitting: FAMILY MEDICINE
Payer: OTHER GOVERNMENT

## 2025-07-08 ENCOUNTER — ANESTHESIA EVENT (OUTPATIENT)
Dept: SURGERY | Facility: HOSPITAL | Age: 60
End: 2025-07-08
Payer: OTHER GOVERNMENT

## 2025-07-08 ENCOUNTER — ANESTHESIA (OUTPATIENT)
Dept: SURGERY | Facility: HOSPITAL | Age: 60
End: 2025-07-08
Payer: OTHER GOVERNMENT

## 2025-07-08 DIAGNOSIS — Z01.818 PRE-OP EVALUATION: ICD-10-CM

## 2025-07-08 DIAGNOSIS — S72.142A CLOSED DISPLACED INTERTROCHANTERIC FRACTURE OF LEFT FEMUR, INITIAL ENCOUNTER: ICD-10-CM

## 2025-07-08 DIAGNOSIS — R07.9 CHEST PAIN: ICD-10-CM

## 2025-07-08 DIAGNOSIS — M79.606 LEG PAIN: ICD-10-CM

## 2025-07-08 DIAGNOSIS — S72.22XA CLOSED DISPLACED SUBTROCHANTERIC FRACTURE OF LEFT FEMUR, INITIAL ENCOUNTER: Primary | ICD-10-CM

## 2025-07-08 PROBLEM — S72.25XA CLOSED NONDISPLACED SUBTROCHANTERIC FRACTURE OF LEFT FEMUR: Status: ACTIVE | Noted: 2025-07-08

## 2025-07-08 LAB
ABORH RETYPE: NORMAL
ABSOLUTE EOSINOPHIL (OHS): 0.12 K/UL
ABSOLUTE MONOCYTE (OHS): 0.45 K/UL (ref 0.3–1)
ABSOLUTE NEUTROPHIL COUNT (OHS): 4.19 K/UL (ref 1.8–7.7)
ALBUMIN SERPL BCP-MCNC: 3.4 G/DL (ref 3.5–5.2)
ALP SERPL-CCNC: 57 UNIT/L (ref 40–150)
ALT SERPL W/O P-5'-P-CCNC: 26 UNIT/L (ref 10–44)
AMORPH CRY UR QL COMP ASSIST: NORMAL
ANION GAP (OHS): 9 MMOL/L (ref 8–16)
APTT PPP: 24.2 SECONDS (ref 21–32)
AST SERPL-CCNC: 15 UNIT/L (ref 11–45)
BACTERIA #/AREA URNS AUTO: NORMAL /HPF
BASOPHILS # BLD AUTO: 0.03 K/UL
BASOPHILS NFR BLD AUTO: 0.5 %
BILIRUB SERPL-MCNC: 0.7 MG/DL (ref 0.1–1)
BILIRUB UR QL STRIP.AUTO: NEGATIVE
BUN SERPL-MCNC: 12 MG/DL (ref 6–20)
CALCIUM SERPL-MCNC: 8.9 MG/DL (ref 8.7–10.5)
CHLORIDE SERPL-SCNC: 106 MMOL/L (ref 95–110)
CLARITY UR: ABNORMAL
CO2 SERPL-SCNC: 23 MMOL/L (ref 23–29)
COLOR UR AUTO: YELLOW
CREAT SERPL-MCNC: 0.7 MG/DL (ref 0.5–1.4)
ERYTHROCYTE [DISTWIDTH] IN BLOOD BY AUTOMATED COUNT: 13.2 % (ref 11.5–14.5)
GFR SERPLBLD CREATININE-BSD FMLA CKD-EPI: >60 ML/MIN/1.73/M2
GLUCOSE SERPL-MCNC: 280 MG/DL (ref 70–110)
GLUCOSE UR QL STRIP: ABNORMAL
HCT VFR BLD AUTO: 40.4 % (ref 40–54)
HGB BLD-MCNC: 13.3 GM/DL (ref 14–18)
HGB UR QL STRIP: NEGATIVE
IMM GRANULOCYTES # BLD AUTO: 0.04 K/UL (ref 0–0.04)
IMM GRANULOCYTES NFR BLD AUTO: 0.7 % (ref 0–0.5)
INDIRECT COOMBS: NORMAL
INR PPP: 1.1 (ref 0.8–1.2)
KETONES UR QL STRIP: ABNORMAL
LEUKOCYTE ESTERASE UR QL STRIP: NEGATIVE
LYMPHOCYTES # BLD AUTO: 1.15 K/UL (ref 1–4.8)
MCH RBC QN AUTO: 27.3 PG (ref 27–31)
MCHC RBC AUTO-ENTMCNC: 32.9 G/DL (ref 32–36)
MCV RBC AUTO: 83 FL (ref 82–98)
MICROSCOPIC COMMENT: NORMAL
NITRITE UR QL STRIP: NEGATIVE
NUCLEATED RBC (/100WBC) (OHS): 0 /100 WBC
OHS QRS DURATION: 114 MS
OHS QTC CALCULATION: 440 MS
PH UR STRIP: 7 [PH]
PLATELET # BLD AUTO: 157 K/UL (ref 150–450)
PMV BLD AUTO: 9.6 FL (ref 9.2–12.9)
POCT GLUCOSE: 182 MG/DL (ref 70–110)
POCT GLUCOSE: 204 MG/DL (ref 70–110)
POCT GLUCOSE: 276 MG/DL (ref 70–110)
POCT GLUCOSE: 277 MG/DL (ref 70–110)
POTASSIUM SERPL-SCNC: 3.8 MMOL/L (ref 3.5–5.1)
PROT SERPL-MCNC: 6.9 GM/DL (ref 6–8.4)
PROT UR QL STRIP: NEGATIVE
PROTHROMBIN TIME: 12 SECONDS (ref 9–12.5)
RBC # BLD AUTO: 4.87 M/UL (ref 4.6–6.2)
RELATIVE EOSINOPHIL (OHS): 2 %
RELATIVE LYMPHOCYTE (OHS): 19.2 % (ref 18–48)
RELATIVE MONOCYTE (OHS): 7.5 % (ref 4–15)
RELATIVE NEUTROPHIL (OHS): 70.1 % (ref 38–73)
RH BLD: NORMAL
SODIUM SERPL-SCNC: 138 MMOL/L (ref 136–145)
SP GR UR STRIP: 1.02
SPECIMEN OUTDATE: NORMAL
UROBILINOGEN UR STRIP-ACNC: NEGATIVE EU/DL
WBC # BLD AUTO: 5.98 K/UL (ref 3.9–12.7)
WBC #/AREA URNS AUTO: 1 /HPF (ref 0–5)
YEAST UR QL AUTO: NORMAL /HPF

## 2025-07-08 PROCEDURE — 93010 ELECTROCARDIOGRAM REPORT: CPT | Mod: ,,, | Performed by: INTERNAL MEDICINE

## 2025-07-08 PROCEDURE — 81003 URINALYSIS AUTO W/O SCOPE: CPT | Performed by: EMERGENCY MEDICINE

## 2025-07-08 PROCEDURE — 5A09357 ASSISTANCE WITH RESPIRATORY VENTILATION, LESS THAN 24 CONSECUTIVE HOURS, CONTINUOUS POSITIVE AIRWAY PRESSURE: ICD-10-PCS | Performed by: INTERNAL MEDICINE

## 2025-07-08 PROCEDURE — 94760 N-INVAS EAR/PLS OXIMETRY 1: CPT

## 2025-07-08 PROCEDURE — 94660 CPAP INITIATION&MGMT: CPT

## 2025-07-08 PROCEDURE — 93005 ELECTROCARDIOGRAM TRACING: CPT

## 2025-07-08 PROCEDURE — 25000003 PHARM REV CODE 250: Performed by: NURSE PRACTITIONER

## 2025-07-08 PROCEDURE — 86920 COMPATIBILITY TEST SPIN: CPT | Performed by: NURSE PRACTITIONER

## 2025-07-08 PROCEDURE — 80053 COMPREHEN METABOLIC PANEL: CPT | Performed by: EMERGENCY MEDICINE

## 2025-07-08 PROCEDURE — 85730 THROMBOPLASTIN TIME PARTIAL: CPT | Performed by: EMERGENCY MEDICINE

## 2025-07-08 PROCEDURE — 86850 RBC ANTIBODY SCREEN: CPT | Performed by: NURSE PRACTITIONER

## 2025-07-08 PROCEDURE — 36415 COLL VENOUS BLD VENIPUNCTURE: CPT | Performed by: NURSE PRACTITIONER

## 2025-07-08 PROCEDURE — 94799 UNLISTED PULMONARY SVC/PX: CPT

## 2025-07-08 PROCEDURE — 63600175 PHARM REV CODE 636 W HCPCS: Performed by: FAMILY MEDICINE

## 2025-07-08 PROCEDURE — 99285 EMERGENCY DEPT VISIT HI MDM: CPT | Mod: 25

## 2025-07-08 PROCEDURE — 21400001 HC TELEMETRY ROOM

## 2025-07-08 PROCEDURE — 36415 COLL VENOUS BLD VENIPUNCTURE: CPT | Performed by: FAMILY MEDICINE

## 2025-07-08 PROCEDURE — 85610 PROTHROMBIN TIME: CPT | Performed by: EMERGENCY MEDICINE

## 2025-07-08 PROCEDURE — 99285 EMERGENCY DEPT VISIT HI MDM: CPT | Mod: 57,,, | Performed by: ORTHOPAEDIC SURGERY

## 2025-07-08 PROCEDURE — 85025 COMPLETE CBC W/AUTO DIFF WBC: CPT | Performed by: EMERGENCY MEDICINE

## 2025-07-08 PROCEDURE — 27000190 HC CPAP FULL FACE MASK W/VALVE

## 2025-07-08 PROCEDURE — 63600175 PHARM REV CODE 636 W HCPCS: Performed by: EMERGENCY MEDICINE

## 2025-07-08 PROCEDURE — 83036 HEMOGLOBIN GLYCOSYLATED A1C: CPT | Performed by: FAMILY MEDICINE

## 2025-07-08 PROCEDURE — 94761 N-INVAS EAR/PLS OXIMETRY MLT: CPT

## 2025-07-08 PROCEDURE — 27100171 HC OXYGEN HIGH FLOW UP TO 24 HOURS

## 2025-07-08 PROCEDURE — 99900035 HC TECH TIME PER 15 MIN (STAT)

## 2025-07-08 PROCEDURE — 63600175 PHARM REV CODE 636 W HCPCS: Performed by: NURSE PRACTITIONER

## 2025-07-08 RX ORDER — TALC
6 POWDER (GRAM) TOPICAL NIGHTLY PRN
Status: DISCONTINUED | OUTPATIENT
Start: 2025-07-08 | End: 2025-07-16 | Stop reason: HOSPADM

## 2025-07-08 RX ORDER — HYDROMORPHONE HYDROCHLORIDE 1 MG/ML
1 INJECTION, SOLUTION INTRAMUSCULAR; INTRAVENOUS; SUBCUTANEOUS ONCE
Refills: 0 | Status: DISCONTINUED | OUTPATIENT
Start: 2025-07-08 | End: 2025-07-08

## 2025-07-08 RX ORDER — ONDANSETRON HYDROCHLORIDE 2 MG/ML
4 INJECTION, SOLUTION INTRAVENOUS EVERY 6 HOURS PRN
Status: DISCONTINUED | OUTPATIENT
Start: 2025-07-08 | End: 2025-07-10 | Stop reason: SDUPTHER

## 2025-07-08 RX ORDER — IBUPROFEN 200 MG
16 TABLET ORAL
Status: DISCONTINUED | OUTPATIENT
Start: 2025-07-08 | End: 2025-07-16 | Stop reason: HOSPADM

## 2025-07-08 RX ORDER — NALOXONE HCL 0.4 MG/ML
0.02 VIAL (ML) INJECTION
Status: DISCONTINUED | OUTPATIENT
Start: 2025-07-08 | End: 2025-07-16 | Stop reason: HOSPADM

## 2025-07-08 RX ORDER — ATORVASTATIN CALCIUM 10 MG/1
20 TABLET, FILM COATED ORAL DAILY
Status: DISCONTINUED | OUTPATIENT
Start: 2025-07-08 | End: 2025-07-16 | Stop reason: HOSPADM

## 2025-07-08 RX ORDER — HYDROCODONE BITARTRATE AND ACETAMINOPHEN 500; 5 MG/1; MG/1
TABLET ORAL
Status: DISCONTINUED | OUTPATIENT
Start: 2025-07-08 | End: 2025-07-16 | Stop reason: HOSPADM

## 2025-07-08 RX ORDER — POLYETHYLENE GLYCOL 3350 17 G/17G
17 POWDER, FOR SOLUTION ORAL 2 TIMES DAILY PRN
Status: DISCONTINUED | OUTPATIENT
Start: 2025-07-08 | End: 2025-07-16 | Stop reason: HOSPADM

## 2025-07-08 RX ORDER — SODIUM CHLORIDE 0.9 % (FLUSH) 0.9 %
10 SYRINGE (ML) INJECTION EVERY 12 HOURS PRN
Status: DISCONTINUED | OUTPATIENT
Start: 2025-07-08 | End: 2025-07-16 | Stop reason: HOSPADM

## 2025-07-08 RX ORDER — INSULIN GLARGINE 100 [IU]/ML
13 INJECTION, SOLUTION SUBCUTANEOUS NIGHTLY
Status: DISCONTINUED | OUTPATIENT
Start: 2025-07-11 | End: 2025-07-16

## 2025-07-08 RX ORDER — MORPHINE SULFATE 4 MG/ML
4 INJECTION, SOLUTION INTRAMUSCULAR; INTRAVENOUS
Refills: 0 | Status: COMPLETED | OUTPATIENT
Start: 2025-07-08 | End: 2025-07-08

## 2025-07-08 RX ORDER — HYDRALAZINE HYDROCHLORIDE 20 MG/ML
10 INJECTION INTRAMUSCULAR; INTRAVENOUS EVERY 6 HOURS PRN
Status: DISCONTINUED | OUTPATIENT
Start: 2025-07-08 | End: 2025-07-16 | Stop reason: HOSPADM

## 2025-07-08 RX ORDER — ACETAMINOPHEN 325 MG/1
650 TABLET ORAL EVERY 4 HOURS PRN
Status: DISCONTINUED | OUTPATIENT
Start: 2025-07-08 | End: 2025-07-16 | Stop reason: HOSPADM

## 2025-07-08 RX ORDER — INSULIN ASPART 100 [IU]/ML
0-5 INJECTION, SOLUTION INTRAVENOUS; SUBCUTANEOUS EVERY 6 HOURS PRN
Status: DISCONTINUED | OUTPATIENT
Start: 2025-07-08 | End: 2025-07-10

## 2025-07-08 RX ORDER — INSULIN ASPART 100 [IU]/ML
0-10 INJECTION, SOLUTION INTRAVENOUS; SUBCUTANEOUS
Status: DISCONTINUED | OUTPATIENT
Start: 2025-07-08 | End: 2025-07-08

## 2025-07-08 RX ORDER — CARVEDILOL 6.25 MG/1
6.25 TABLET ORAL 2 TIMES DAILY WITH MEALS
Status: DISCONTINUED | OUTPATIENT
Start: 2025-07-08 | End: 2025-07-16 | Stop reason: HOSPADM

## 2025-07-08 RX ORDER — FLECAINIDE ACETATE 50 MG/1
50 TABLET ORAL EVERY 12 HOURS
Status: DISCONTINUED | OUTPATIENT
Start: 2025-07-08 | End: 2025-07-16 | Stop reason: HOSPADM

## 2025-07-08 RX ORDER — GLUCAGON 1 MG
1 KIT INJECTION
Status: DISCONTINUED | OUTPATIENT
Start: 2025-07-08 | End: 2025-07-10

## 2025-07-08 RX ORDER — IBUPROFEN 200 MG
24 TABLET ORAL
Status: DISCONTINUED | OUTPATIENT
Start: 2025-07-08 | End: 2025-07-16 | Stop reason: HOSPADM

## 2025-07-08 RX ORDER — ALUMINUM HYDROXIDE, MAGNESIUM HYDROXIDE, AND SIMETHICONE 1200; 120; 1200 MG/30ML; MG/30ML; MG/30ML
30 SUSPENSION ORAL 4 TIMES DAILY PRN
Status: DISCONTINUED | OUTPATIENT
Start: 2025-07-08 | End: 2025-07-16 | Stop reason: HOSPADM

## 2025-07-08 RX ORDER — GLUCAGON 1 MG
1 KIT INJECTION
Status: DISCONTINUED | OUTPATIENT
Start: 2025-07-08 | End: 2025-07-16 | Stop reason: HOSPADM

## 2025-07-08 RX ORDER — INSULIN GLARGINE 100 [IU]/ML
13 INJECTION, SOLUTION SUBCUTANEOUS NIGHTLY
Status: DISCONTINUED | OUTPATIENT
Start: 2025-07-08 | End: 2025-07-08

## 2025-07-08 RX ORDER — SODIUM CHLORIDE 9 MG/ML
INJECTION, SOLUTION INTRAVENOUS CONTINUOUS
Status: ACTIVE | OUTPATIENT
Start: 2025-07-08 | End: 2025-07-08

## 2025-07-08 RX ORDER — MORPHINE SULFATE 4 MG/ML
4 INJECTION, SOLUTION INTRAMUSCULAR; INTRAVENOUS EVERY 4 HOURS PRN
Status: DISCONTINUED | OUTPATIENT
Start: 2025-07-08 | End: 2025-07-16 | Stop reason: HOSPADM

## 2025-07-08 RX ORDER — PROCHLORPERAZINE EDISYLATE 5 MG/ML
5 INJECTION INTRAMUSCULAR; INTRAVENOUS EVERY 6 HOURS PRN
Status: DISCONTINUED | OUTPATIENT
Start: 2025-07-08 | End: 2025-07-16 | Stop reason: HOSPADM

## 2025-07-08 RX ADMIN — INSULIN ASPART 6 UNITS: 100 INJECTION, SOLUTION INTRAVENOUS; SUBCUTANEOUS at 12:07

## 2025-07-08 RX ADMIN — MORPHINE SULFATE 4 MG: 4 INJECTION INTRAVENOUS at 10:07

## 2025-07-08 RX ADMIN — SODIUM CHLORIDE: 9 INJECTION, SOLUTION INTRAVENOUS at 11:07

## 2025-07-08 RX ADMIN — FLECAINIDE ACETATE 50 MG: 50 TABLET ORAL at 09:07

## 2025-07-08 RX ADMIN — MORPHINE SULFATE 4 MG: 4 INJECTION INTRAVENOUS at 11:07

## 2025-07-08 RX ADMIN — INSULIN ASPART 2 UNITS: 100 INJECTION, SOLUTION INTRAVENOUS; SUBCUTANEOUS at 05:07

## 2025-07-08 RX ADMIN — FLECAINIDE ACETATE 50 MG: 50 TABLET ORAL at 11:07

## 2025-07-08 RX ADMIN — CARVEDILOL 6.25 MG: 6.25 TABLET, FILM COATED ORAL at 05:07

## 2025-07-08 RX ADMIN — SODIUM CHLORIDE: 9 INJECTION, SOLUTION INTRAVENOUS at 07:07

## 2025-07-08 RX ADMIN — ATORVASTATIN CALCIUM 20 MG: 10 TABLET, FILM COATED ORAL at 11:07

## 2025-07-08 NOTE — SUBJECTIVE & OBJECTIVE
Past Medical History:   Diagnosis Date    Diabetes mellitus     Hyperlipidemia     Hypertension     EVELYN (obstructive sleep apnea)     Paroxysmal atrial fibrillation        Past Surgical History:   Procedure Laterality Date    CYSTOSCOPY W/ RETROGRADES Right 10/9/2024    Procedure: CYSTOSCOPY, WITH RETROGRADE PYELOGRAM;  Surgeon: Shawn Otto MD;  Location: [facility] OR;  Service: Urology;  Laterality: Right;    TRANSESOPHAGEAL ECHOCARDIOGRAM WITH POSSIBLE CARDIOVERSION (SOFYA W/ POSS CARDIOVERSION) N/A 6/27/2023    Procedure: Transesophageal echo (SOFYA) intra-procedure log documentation;  Surgeon: Santy Martinez MD;  Location: [facility] CATH LAB;  Service: Cardiology;  Laterality: N/A;       Review of patient's allergies indicates:   Allergen Reactions    1 plus 1-f Rash    Sulfa (sulfonamide antibiotics) Rash       No current facility-administered medications on file prior to encounter.     Current Outpatient Medications on File Prior to Encounter   Medication Sig    amLODIPine (NORVASC) 10 MG tablet Take 1 tablet by mouth once daily.    apixaban (ELIQUIS) 5 mg Tab Take 1 tablet (5 mg total) by mouth 2 (two) times daily.    atorvastatin (LIPITOR) 40 MG tablet Take 20 mg by mouth.    calcipotriene (DOVONOX) 0.005 % cream APPLY MODERATE AMOUNT TOPICALLY TWICE A DAY FOR PLAQUE PSORIASIS    carvediloL (COREG) 6.25 MG tablet Take 1 tablet (6.25 mg total) by mouth 2 (two) times daily with meals.    cholecalciferol, vitamin D3, (VITAMIN D3) 50 mcg (2,000 unit) Tab Take 50 mcg by mouth.    cyanocobalamin (VITAMIN B-12) 1000 MCG tablet Take 1 tablet by mouth once daily.    flecainide (TAMBOCOR) 50 MG Tab Take 1 tablet (50 mg total) by mouth every 12 (twelve) hours.    insulin glargine-yfgn 100 unit/mL Soln Inject 26 Units into the skin every evening.    metFORMIN (GLUCOPHAGE) 1000 MG tablet Take 1,000 mg by mouth.    semaglutide (OZEMPIC) 0.25 mg or 0.5 mg (2 mg/3 mL) pen injector Inject into the skin.     HYDROcodone-acetaminophen (NORCO)  mg per tablet Take 1 tablet by mouth every 6 (six) hours as needed.    ondansetron (ZOFRAN) 4 MG tablet Take 1 tablet (4 mg total) by mouth every 6 (six) hours as needed for Nausea.    tamsulosin (FLOMAX) 0.4 mg Cap Take 1 capsule (0.4 mg total) by mouth once daily.    [DISCONTINUED] lisinopriL-hydrochlorothiazide (PRINZIDE,ZESTORETIC) 20-12.5 mg per tablet Take 2 tablets by mouth once daily.    [DISCONTINUED] magnesium oxide 420 mg Tab Take 420 mg by mouth once daily.     Family History    None       Tobacco Use    Smoking status: Never    Smokeless tobacco: Former     Quit date: 06/2024   Substance and Sexual Activity    Alcohol use: Not Currently     Alcohol/week: 6.0 standard drinks of alcohol     Types: 6 Cans of beer per week     Comment: 6 cans of beer on Saturdays    Drug use: Never    Sexual activity: Yes     Partners: Female     Review of Systems   Musculoskeletal:  Positive for gait problem and joint swelling.   All other systems reviewed and are negative.    Objective:     Vital Signs (Most Recent):  Temp: 97.8 °F (36.6 °C) (07/08/25 0725)  Pulse: 74 (07/08/25 1005)  Resp: 18 (07/08/25 1024)  BP: (!) 144/77 (07/08/25 1005)  SpO2: 96 % (07/08/25 1005) Vital Signs (24h Range):  Temp:  [97.8 °F (36.6 °C)] 97.8 °F (36.6 °C)  Pulse:  [57-76] 74  Resp:  [16-19] 18  SpO2:  [95 %-97 %] 96 %  BP: (144-170)/(65-91) 144/77     Weight: (!) 152.9 kg (337 lb) (please obtain weight)  Body mass index is 43.27 kg/m².     Physical Exam  Vitals and nursing note reviewed.   Constitutional:       General: He is not in acute distress.     Appearance: He is well-developed. He is not diaphoretic.   HENT:      Head: Normocephalic and atraumatic.      Right Ear: Hearing and external ear normal.      Left Ear: Hearing and external ear normal.      Nose: Nose normal. No mucosal edema or rhinorrhea.      Mouth/Throat:      Pharynx: Uvula midline.   Eyes:      General:         Right eye: No  discharge.         Left eye: No discharge.      Conjunctiva/sclera: Conjunctivae normal.      Right eye: No chemosis.     Left eye: No chemosis.     Pupils: Pupils are equal, round, and reactive to light.   Neck:      Thyroid: No thyroid mass or thyromegaly.      Trachea: Trachea normal.   Cardiovascular:      Rate and Rhythm: Normal rate and regular rhythm.      Pulses:           Dorsalis pedis pulses are 2+ on the right side and 2+ on the left side.      Heart sounds: Normal heart sounds. No murmur heard.  Pulmonary:      Effort: Pulmonary effort is normal. No respiratory distress.      Breath sounds: Normal breath sounds. No decreased breath sounds or wheezing.   Abdominal:      General: Bowel sounds are normal. There is no distension.      Palpations: Abdomen is soft.      Tenderness: There is no abdominal tenderness.   Musculoskeletal:         General: Normal range of motion.      Cervical back: Normal range of motion and neck supple.      Comments: Left leg: LROM, +pedal pulses, NVI   Lymphadenopathy:      Cervical: No cervical adenopathy.      Upper Body:      Right upper body: No supraclavicular adenopathy.      Left upper body: No supraclavicular adenopathy.   Skin:     General: Skin is warm and dry.      Capillary Refill: Capillary refill takes less than 2 seconds.      Findings: No rash.   Neurological:      Mental Status: He is alert and oriented to person, place, and time.   Psychiatric:         Mood and Affect: Mood is not anxious.         Speech: Speech normal.         Behavior: Behavior normal.         Thought Content: Thought content normal.         Judgment: Judgment normal.              CRANIAL NERVES     CN III, IV, VI   Pupils are equal, round, and reactive to light.       Significant Labs: All pertinent labs within the past 24 hours have been reviewed.  CBC:   Recent Labs   Lab 07/08/25  0739   WBC 5.98   HGB 13.3*   HCT 40.4        CMP:   Recent Labs   Lab 07/08/25  0739      K  3.8      CO2 23   *   BUN 12   CREATININE 0.7   CALCIUM 8.9   PROT 6.9   ALBUMIN 3.4*   BILITOT 0.7   ALKPHOS 57   AST 15   ALT 26   ANIONGAP 9       Significant Imaging: I have reviewed all pertinent imaging results/findings within the past 24 hours.

## 2025-07-08 NOTE — CONSULTS
[facility]  Orthopedics  Consult Note    Patient Name: Roland Lopez  MRN: [id number]  Admission Date: 7/8/2025  Hospital Length of Stay: 0 days  Attending Provider: Pratik Nova MD  Primary Care Provider: Em[facility] Primary Doctor    Patient information was obtained from patient, relative(s), and ER records.     Consults  Subjective:     Principal Problem:<principal problem not specified>    Chief Complaint:   Chief Complaint   Patient presents with    Fall     LERN patient: Per EMS, patient tripped and fell; c/o left hip pain; received Fentanyl 100 mcg prior to arrival to ED; + use of blood thinners        HPI: Roland Lopez is a 60 y.o. male with past medical history significant for had a fall this morning going out his back door slipping on cat urine.  He basically did the splits injuring his left leg.  He denies any loss of consciousness or other injury.  He denies any tingling or numbness in the lower extremity    Past Medical History:   Diagnosis Date    Diabetes mellitus     Hyperlipidemia     Hypertension     EVELYN (obstructive sleep apnea)     Paroxysmal atrial fibrillation        Past Surgical History:   Procedure Laterality Date    CYSTOSCOPY W/ RETROGRADES Right 10/9/2024    Procedure: CYSTOSCOPY, WITH RETROGRADE PYELOGRAM;  Surgeon: Shawn Otto MD;  Location: [facility] OR;  Service: Urology;  Laterality: Right;    TRANSESOPHAGEAL ECHOCARDIOGRAM WITH POSSIBLE CARDIOVERSION (SOFYA W/ POSS CARDIOVERSION) N/A 6/27/2023    Procedure: Transesophageal echo (SOFYA) intra-procedure log documentation;  Surgeon: Santy Martinez MD;  Location: [facility] CATH LAB;  Service: Cardiology;  Laterality: N/A;       Review of patient's allergies indicates:   Allergen Reactions    1 plus 1-f Rash    Sulfa (sulfonamide antibiotics) Rash       Current Facility-Administered Medications   Medication    morphine injection 4 mg     Current Outpatient Medications   Medication Sig    amLODIPine (NORVASC) 10 MG tablet Take 1 tablet by  mouth once daily.    apixaban (ELIQUIS) 5 mg Tab Take 1 tablet (5 mg total) by mouth 2 (two) times daily.    atorvastatin (LIPITOR) 40 MG tablet Take 20 mg by mouth.    calcipotriene (DOVONOX) 0.005 % cream APPLY MODERATE AMOUNT TOPICALLY TWICE A DAY FOR PLAQUE PSORIASIS    carvediloL (COREG) 6.25 MG tablet Take 1 tablet (6.25 mg total) by mouth 2 (two) times daily with meals.    cholecalciferol, vitamin D3, (VITAMIN D3) 50 mcg (2,000 unit) Tab Take 50 mcg by mouth.    cyanocobalamin (VITAMIN B-12) 1000 MCG tablet Take 1 tablet by mouth once daily.    flecainide (TAMBOCOR) 50 MG Tab Take 1 tablet (50 mg total) by mouth every 12 (twelve) hours.    HYDROcodone-acetaminophen (NORCO)  mg per tablet Take 1 tablet by mouth every 6 (six) hours as needed.    insulin glargine-yfgn 100 unit/mL Soln Inject 20 Units into the skin every evening.    lisinopriL-hydrochlorothiazide (PRINZIDE,ZESTORETIC) 20-12.5 mg per tablet Take 2 tablets by mouth once daily.    magnesium oxide 420 mg Tab Take 420 mg by mouth once daily.    metFORMIN (GLUCOPHAGE) 1000 MG tablet Take 1,000 mg by mouth.    ondansetron (ZOFRAN) 4 MG tablet Take 1 tablet (4 mg total) by mouth every 6 (six) hours as needed for Nausea.    semaglutide (OZEMPIC) 0.25 mg or 0.5 mg (2 mg/3 mL) pen injector Inject into the skin.    tamsulosin (FLOMAX) 0.4 mg Cap Take 1 capsule (0.4 mg total) by mouth once daily.     Family History    None       Tobacco Use    Smoking status: Never    Smokeless tobacco: Former     Quit date: 06/2024   Substance and Sexual Activity    Alcohol use: Not Currently     Alcohol/week: 6.0 standard drinks of alcohol     Types: 6 Cans of beer per week     Comment: 6 cans of beer on Saturdays    Drug use: Never    Sexual activity: Yes     Partners: Female     Review of Systems   Constitutional: Negative for chills and decreased appetite.   HENT:  Negative for congestion.    Eyes:  Negative for discharge and double vision.   Cardiovascular:   "Negative for chest pain.   Respiratory:  Negative for shortness of breath.    Endocrine: Negative for cold intolerance and heat intolerance.   Skin:  Negative for dry skin and itching.   Gastrointestinal:  Negative for abdominal pain.   Genitourinary:  Negative for dysuria.   Neurological:  Negative for light-headedness and loss of balance.   Psychiatric/Behavioral:  Negative for altered mental status.    Allergic/Immunologic: Negative for HIV exposure.     Objective:     Vital Signs (Most Recent):  Temp: 97.8 °F (36.6 °C) (07/08/25 0725)  Pulse: 69 (07/08/25 0930)  Resp: 18 (07/08/25 0930)  BP: (!) 165/86 (07/08/25 0930)  SpO2: 95 % (07/08/25 0930) Vital Signs (24h Range):  Temp:  [97.8 °F (36.6 °C)] 97.8 °F (36.6 °C)  Pulse:  [57-76] 69  Resp:  [16-19] 18  SpO2:  [95 %-97 %] 95 %  BP: (144-170)/(65-91) 165/86     Weight: (!) 152.9 kg (337 lb) (please obtain weight)  Height: 6' 2" (188 cm)  Body mass index is 43.27 kg/m².    No intake or output data in the 24 hours ending 07/08/25 1007    Ortho/SPM Exam  Skin is intact about the hip no sign of laceration or abrasion there is some contusion  Neurovascular exam of the extremities normal  I did not logroll of the hip due to pain  He has no effusion in the knees but does have significant psoriatic plaques to the skin  He has no upper extremity complaints    GEN: Well developed, well nourished male. AAOX3. No acute distress.   Head: Normocephalic, atraumatic.   Eyes: CHEY  Neck: Trachea is midline, no adenopathy  Resp: Breathing unlabored.  Neuro: Motor function normal, Cranial nerves intact  Psych: Mood and affect pleasant.      Significant Labs: BMP:   Recent Labs   Lab 07/08/25  0739   *      K 3.8      CO2 23   BUN 12   CREATININE 0.7   CALCIUM 8.9     CBC:   Recent Labs   Lab 07/08/25  0739   WBC 5.98   HGB 13.3*   HCT 40.4        Coagulation:   Recent Labs   Lab 07/08/25  0739   INR 1.1   APTT 24.2     All pertinent labs within the past " 24 hours have been reviewed.    Significant Imaging: X-Ray: I have reviewed all pertinent results/findings and my personal findings are:  Markedly displaced subtrochanteric left femur fracture with extension in the intertrochanteric region    Assessment/Plan:     There are no hospital problems to display for this patient.      Assessment:   Left femur fracture    Plan:   This is a surgical problem.  He has been NPO.  We will plan surgery later today.  We went over the risks and benefits of IM rodding.  I told him with the fracture location sometimes I have to actually open the fracture itself to get this reduced well.  We went over the risks and benefits of surgery.  Risks include infection, persistent pain, injury to blood vessels or nerves, anesthesia problems as being some but not all the risks.  He understood this well and consented to proceed.    Kodi Frye MD, MD  Orthopedics  [facility]

## 2025-07-08 NOTE — ASSESSMENT & PLAN NOTE
MELD-Na score calculated; MELD 3.0: 7 at 7/8/2025  7:39 AM  MELD-Na: 7 at 7/8/2025  7:39 AM  Calculated from:  Serum Creatinine: 0.7 mg/dL (Using min of 1 mg/dL) at 7/8/2025  7:39 AM  Serum Sodium: 138 mmol/L (Using max of 137 mmol/L) at 7/8/2025  7:39 AM  Total Bilirubin: 0.7 mg/dL (Using min of 1 mg/dL) at 7/8/2025  7:39 AM  Serum Albumin: 3.4 g/dL at 7/8/2025  7:39 AM  INR(ratio): 1.1 at 7/8/2025  7:39 AM  Age at listing (hypothetical): 60 years  Sex: Male at 7/8/2025  7:39 AM      Continue chronic meds. Etiology likely ETOH. Will avoid any hepatotoxic meds, and monitor CBC/CMP/INR for synthetic function.

## 2025-07-08 NOTE — ASSESSMENT & PLAN NOTE
Patient had a fall at home PTA, xray left femur: There is an acute appearing fracture in the intertrochanteric portion of the left femur.     --Orthopedic surgery consulted  --Planned for surgery on day of admit  --Pain control per MAR  --NPO for now  --PT/OT post-op

## 2025-07-08 NOTE — ASSESSMENT & PLAN NOTE
Patient has paroxysmal (<7 days) atrial fibrillation. Patient is currently in sinus rhythm. [id number] Score: 1. The patients heart rate in the last 24 hours is as follows:  Pulse  Min: 57  Max: 77     Antiarrhythmics  flecainide tablet 50 mg, Every 12 hours, Oral    Anticoagulants       Plan  - Replete lytes with a goal of K>4, Mg >2  - Patient is not anticoagulated due to Eliquis on hold for surgery  - Patient's afib is currently controlled

## 2025-07-08 NOTE — ASSESSMENT & PLAN NOTE
Patient's blood pressure range in the last 24 hours was: BP  Min: 144/77  Max: 170/85.The patient's inpatient anti-hypertensive regimen is listed below:  Current Antihypertensives  carvediloL tablet 6.25 mg, 2 times daily with meals, Oral  hydrALAZINE injection 10 mg, Every 6 hours PRN, Intravenous    Plan  - BP is controlled, no changes needed to their regimen

## 2025-07-08 NOTE — ED PROVIDER NOTES
SCRIBE #1 NOTE: IKarena, am scribing for, and in the presence of, Pratik Nova MD. I have scribed the entire note.       History     Chief Complaint   Patient presents with    Fall     LERN patient: Per EMS, patient tripped and fell; c/o left hip pain; received Fentanyl 100 mcg prior to arrival to ED; + use of blood thinners     Review of patient's allergies indicates:   Allergen Reactions    1 plus 1-f Rash    Sulfa (sulfonamide antibiotics) Rash         History of Present Illness     HPI    7/8/2025, 7:23 AM  History obtained from the patient and medical records      History of Present Illness: Roland Lopez is a 60 y.o. male patient with a PMHx of HTN, EVELYN, DM, HLD, and paroxysmal A fib who presents to the Emergency Department for evaluation of a fall which occurred this morning. Pt state he was getting ready for work when he slipped on what he thinks was cat urine in his home. Pt tried to use the installed railings in his home but could not catch himself. Symptoms are constant and moderate in severity. No mitigating or exacerbating factors reported. Associated sxs include left leg pain and left hip pain. Patient denies any nausea or LOC. No prior Tx specified. Pt reports he takes eliquis. No further complaints or concerns at this time.       Arrival mode: Ambulance Service    PCP: Emilee, Primary Doctor        Past Medical History:  Past Medical History:   Diagnosis Date    Diabetes mellitus     Hyperlipidemia     Hypertension     EVELYN (obstructive sleep apnea)     Paroxysmal atrial fibrillation        Past Surgical History:  Past Surgical History:   Procedure Laterality Date    CYSTOSCOPY W/ RETROGRADES Right 10/9/2024    Procedure: CYSTOSCOPY, WITH RETROGRADE PYELOGRAM;  Surgeon: Shawn Otto MD;  Location: [facility];  Service: Urology;  Laterality: Right;    TRANSESOPHAGEAL ECHOCARDIOGRAM WITH POSSIBLE CARDIOVERSION (SOFYA W/ POSS CARDIOVERSION) N/A 6/27/2023    Procedure: Transesophageal echo  (SOFYA) intra-procedure log documentation;  Surgeon: Santy Martinez MD;  Location: [facility] CATH LAB;  Service: Cardiology;  Laterality: N/A;         Family History:  No family history on file.    Social History:  Social History     Tobacco Use    Smoking status: Never    Smokeless tobacco: Former     Quit date: 06/2024   Substance and Sexual Activity    Alcohol use: Not Currently     Alcohol/week: 6.0 standard drinks of alcohol     Types: 6 Cans of beer per week     Comment: 6 cans of beer on Saturdays    Drug use: Never    Sexual activity: Yes     Partners: Female        Review of Systems     Review of Systems   Constitutional:  Negative for fever.   HENT:  Negative for sore throat.    Respiratory:  Negative for shortness of breath.    Cardiovascular:  Negative for chest pain.   Gastrointestinal:  Negative for nausea.   Genitourinary:  Negative for dysuria.   Musculoskeletal:  Positive for arthralgias (Left leg and hip pain). Negative for back pain.   Skin:  Negative for rash.   Neurological:  Negative for syncope and weakness.   Hematological:  Does not bruise/bleed easily.   All other systems reviewed and are negative.     Physical Exam     Initial Vitals [07/08/25 0725]   BP Pulse Resp Temp SpO2   (!) 153/84 62 16 97.8 °F (36.6 °C) 97 %      MAP       --          Physical Exam  Nursing Notes and Vital Signs Reviewed.  Constitutional: Patient is in no apparent distress. Well-developed and well-nourished.  Head: Atraumatic. Normocephalic.  Eyes: PERRL. EOM intact. Conjunctivae are not pale. No scleral icterus.  ENT: Mucous membranes are moist. Oropharynx is clear and symmetric.    Neck: Supple. Full ROM. No lymphadenopathy.  Cardiovascular: Regular rate. Regular rhythm. No murmurs, rubs, or gallops. Distal pulses are 2+ and symmetric.  Pulmonary/Chest: No respiratory distress. Clear to auscultation bilaterally. No wheezing or rales.  Abdominal: Soft and non-distended. There is no tenderness.  No rebound, guarding,  "or rigidity. Good bowel sounds.  Genitourinary: No CVA tenderness.  Musculoskeletal: Moves all extremities. No edema. No calf tenderness. Tenderness to left hip and thigh upon palpitation.  Skin: Warm and dry.  Neurological:  Alert, awake, and appropriate.  Normal speech.  No acute focal neurological deficits are appreciated.  Psychiatric: Normal affect. Good eye contact. Appropriate in content.     ED Course   Procedures  ED Vital Signs:  Vitals:    07/08/25 0725 07/08/25 0741 07/08/25 0835 07/08/25 0900   BP: (!) 153/84 (!) 144/65 (!) 170/85 (!) 158/91   Pulse: 62 (!) 57 63 76   Resp: 16 18 19 18   Temp: 97.8 °F (36.6 °C)      TempSrc: Oral      SpO2: 97% 97% 97% 95%   Weight: (!) 152.9 kg (337 lb)      Height: 6' 2" (1.88 m)       07/08/25 0930 07/08/25 1005 07/08/25 1024   BP: (!) 165/86 (!) 144/77    Pulse: 69 74    Resp: 18 18 18   Temp:      TempSrc:      SpO2: 95% 96%    Weight:      Height:          Abnormal Lab Results:  Labs Reviewed   COMPREHENSIVE METABOLIC PANEL - Abnormal       Result Value    Sodium 138      Potassium 3.8      Chloride 106      CO2 23      Glucose 280 (*)     BUN 12      Creatinine 0.7      Calcium 8.9      Protein Total 6.9      Albumin 3.4 (*)     Bilirubin Total 0.7      ALP 57      AST 15      ALT 26      Anion Gap 9      eGFR >60     URINALYSIS, REFLEX TO URINE CULTURE - Abnormal    Color, UA Yellow      Appearance, UA Hazy (*)     pH, UA 7.0      Spec Grav UA 1.020      Protein, UA Negative      Glucose, UA 4+ (*)     Ketones, UA Trace (*)     Bilirubin, UA Negative      Blood, UA Negative      Nitrites, UA Negative      Urobilinogen, UA Negative      Leukocyte Esterase, UA Negative     CBC WITH DIFFERENTIAL - Abnormal    WBC 5.98      RBC 4.87      HGB 13.3 (*)     HCT 40.4      MCV 83      MCH 27.3      MCHC 32.9      RDW 13.2      Platelet Count 157      MPV 9.6      Nucleated RBC 0      Neut % 70.1      Lymph % 19.2      Mono % 7.5      Eos % 2.0      Basophil % 0.5      " Imm Grans % 0.7 (*)     Neut # 4.19      Lymph # 1.15      Mono # 0.45      Eos # 0.12      Baso # 0.03      Imm Grans # 0.04     APTT - Normal    PTT 24.2     PROTIME-INR - Normal    PT 12.0      INR 1.1     CBC W/ AUTO DIFFERENTIAL    Narrative:     The following orders were created for panel order CBC Auto Differential.  Procedure                               Abnormality         Status                     ---------                               -----------         ------                     CBC with Differential[[id number]]       Abnormal            Final result                 Please view results for these tests on the individual orders.   URINALYSIS MICROSCOPIC    WBC, UA 1      Bacteria, UA None      Yeast, UA None      Amorphous, UA Moderate      Microscopic Comment       GREY TOP URINE HOLD   TYPE & SCREEN   PREPARE RBC SOFT        All Lab Results:  Results for orders placed or performed during the hospital encounter of 07/08/25   Comprehensive Metabolic Panel    Collection Time: 07/08/25  7:39 AM   Result Value Ref Range    Sodium 138 136 - 145 mmol/L    Potassium 3.8 3.5 - 5.1 mmol/L    Chloride 106 95 - 110 mmol/L    CO2 23 23 - 29 mmol/L    Glucose 280 (H) 70 - 110 mg/dL    BUN 12 6 - 20 mg/dL    Creatinine 0.7 0.5 - 1.4 mg/dL    Calcium 8.9 8.7 - 10.5 mg/dL    Protein Total 6.9 6.0 - 8.4 gm/dL    Albumin 3.4 (L) 3.5 - 5.2 g/dL    Bilirubin Total 0.7 0.1 - 1.0 mg/dL    ALP 57 40 - 150 unit/L    AST 15 11 - 45 unit/L    ALT 26 10 - 44 unit/L    Anion Gap 9 8 - 16 mmol/L    eGFR >60 >60 mL/min/1.73/m2   APTT    Collection Time: 07/08/25  7:39 AM   Result Value Ref Range    PTT 24.2 21.0 - 32.0 seconds   Protime-INR    Collection Time: 07/08/25  7:39 AM   Result Value Ref Range    PT 12.0 9.0 - 12.5 seconds    INR 1.1 0.8 - 1.2   CBC with Differential    Collection Time: 07/08/25  7:39 AM   Result Value Ref Range    WBC 5.98 3.90 - 12.70 K/uL    RBC 4.87 4.60 - 6.20 M/uL    HGB 13.3 (L) 14.0 - 18.0 gm/dL     HCT 40.4 40.0 - 54.0 %    MCV 83 82 - 98 fL    MCH 27.3 27.0 - 31.0 pg    MCHC 32.9 32.0 - 36.0 g/dL    RDW 13.2 11.5 - 14.5 %    Platelet Count 157 150 - 450 K/uL    MPV 9.6 9.2 - 12.9 fL    Nucleated RBC 0 <=0 /100 WBC    Neut % 70.1 38 - 73 %    Lymph % 19.2 18 - 48 %    Mono % 7.5 4 - 15 %    Eos % 2.0 <=8 %    Basophil % 0.5 <=1.9 %    Imm Grans % 0.7 (H) 0.0 - 0.5 %    Neut # 4.19 1.8 - 7.7 K/uL    Lymph # 1.15 1 - 4.8 K/uL    Mono # 0.45 0.3 - 1 K/uL    Eos # 0.12 <=0.5 K/uL    Baso # 0.03 <=0.2 K/uL    Imm Grans # 0.04 0.00 - 0.04 K/uL   EKG 12-lead    Collection Time: 07/08/25  8:27 AM   Result Value Ref Range    QRS Duration 114 ms    [facility] QTC Calculation 440 ms   Urinalysis, Reflex to Urine Culture Urine, Clean Catch    Collection Time: 07/08/25  9:16 AM    Specimen: Urine, Clean Catch   Result Value Ref Range    Color, UA Yellow Straw, Gemma, Yellow, Light-Orange    Appearance, UA Hazy (A) Clear    pH, UA 7.0 5.0 - 8.0    Spec Grav UA 1.020 1.005 - 1.030    Protein, UA Negative Negative    Glucose, UA 4+ (A) Negative    Ketones, UA Trace (A) Negative    Bilirubin, UA Negative Negative    Blood, UA Negative Negative    Nitrites, UA Negative Negative    Urobilinogen, UA Negative <2.0 EU/dL    Leukocyte Esterase, UA Negative Negative   Urinalysis Microscopic    Collection Time: 07/08/25  9:16 AM   Result Value Ref Range    WBC, UA 1 0 - 5 /HPF    Bacteria, UA None None, Rare, Occasional /HPF    Yeast, UA None None /HPF    Amorphous, UA Moderate None, Occasional, Few, Moderate, Rare    Microscopic Comment         Imaging Results:  Imaging Results              X-Ray Femur Ap/Lat Left (Final result)  Result time 07/08/25 08:42:14      Final result by MC King Sr., MD (07/08/25 08:42:14)                   Impression:      There is an acute appearing fracture in the trochanteric portion of the left femur.      Electronically signed by: Zack King MD  Date:    07/08/2025  Time:    08:42                Narrative:    EXAMINATION:  XR FEMUR 2 VIEW LEFT    CLINICAL HISTORY:  Pain in leg, unspecified    COMPARISON:  None    FINDINGS:  There is an acute appearing fracture in the trochanteric portion of the left femur.  There is no dislocation.                                       X-Ray Pelvis Routine AP (Final result)  Result time 07/08/25 08:41:00   Procedure changed from X-Ray Hip 2 or 3 views Left with Pelvis when performed     Final result by MC King Sr., MD (07/08/25 08:41:00)                   Impression:      There is an acute appearing fracture in the intertrochanteric portion of the left femur.      Electronically signed by: Zack King MD  Date:    07/08/2025  Time:    08:41               Narrative:    EXAMINATION:  XR PELVIS ROUTINE AP    CLINICAL HISTORY:  fall;    COMPARISON:  None    FINDINGS:  There is an acute appearing fracture in the intertrochanteric portion of the left femur.  There is no dislocation.                                       X-Ray Chest AP Portable (Final result)  Result time 07/08/25 08:39:58      Final result by MC Knig Sr., MD (07/08/25 08:39:58)                   Impression:      1. There is no focal pulmonary infiltrate visualized.  2. There is moderate cardiomegaly.  .      Electronically signed by: Zack King MD  Date:    07/08/2025  Time:    08:39               Narrative:    EXAMINATION:  XR CHEST AP PORTABLE    CLINICAL HISTORY:  preop;    COMPARISON:  04/23/2024    FINDINGS:  There is moderate cardiomegaly.  There is no focal pulmonary infiltrate visualized.  There is no pneumothorax.  The costophrenic angles are sharp.                                       CT Head Without Contrast (Final result)  Result time 07/08/25 08:14:03      Final result by MC King Sr., MD (07/08/25 08:14:03)                   Impression:      1. There is no calvarial fracture or intracranial hemorrhage.  2. Right maxillary sinusitis  3. There is a 13 mm  polyp versus mucous retention cyst in the left maxillary sinus.  All CT scans at this facility use dose modulation, iterative reconstruction, and/or weight base dosing when appropriate to reduce radiation dose when appropriate to reduce radiation dose to as low as reasonably achievable.      Electronically signed by: Zack King MD  Date:    07/08/2025  Time:    08:14               Narrative:    EXAMINATION:  CT HEAD WITHOUT CONTRAST    CLINICAL HISTORY:  Head trauma, moderate-severe;    TECHNIQUE:  Standard brain CT protocol without IV contrast was performed.    COMPARISON:  None    FINDINGS:  There is no calvarial fracture or intracranial hemorrhage.  There are mild chronic appearing ischemic changes in the deep white matter of both cerebral hemispheres.  There is moderate partial opacification of the right maxillary sinus.  There is a 13 mm polyp versus mucous retention cyst in the left maxillary sinus.                                       The EKG was ordered, reviewed, and independently interpreted by the ED provider.  Interpretation time: 08:27  Rate: 61 BPM  Rhythm: normal sinus rhythm  Interpretation: Moderate voltage criteria for LVH, may be normal variant (R in aVL, Yunier product). Borderline ECG. No STEMI.           The Emergency Provider reviewed the vital signs and test results, which are outlined above.     ED Discussion     10:03 AM: Discussed case with Dr. Onel MD ([facility]). Dr. Sykes agrees with current care and management of pt and accepts admission.   Admitting Service: [facility]  Admitting Physician: Dr. Sykes  Admit to: Inpatient Med/Tele    10:03 AM: Re-evaluated pt.  I have discussed test results, shared treatment plan, and the need for admission with patient/family/caretaker at bedside. Pt and/or family/caretaker express understanding at this time and agree with all information. All questions answered. Pt/caretaker/family member(s) have no further questions or concerns  at this time. Pt is ready for admit.       Medical Decision Making  DDx: hip pain, fracture    Amount and/or Complexity of Data Reviewed  Labs: ordered. Decision-making details documented in ED Course.  Radiology: ordered. Decision-making details documented in ED Course.  ECG/medicine tests: ordered and independent interpretation performed. Decision-making details documented in ED Course.    Risk  Prescription drug management.  Decision regarding hospitalization.                ED Medication(s):  Medications   0.9%  NaCl infusion (for blood administration) (has no administration in time range)   HYDROmorphone injection 1 mg (has no administration in time range)   0.9% NaCl infusion (has no administration in time range)   morphine injection 4 mg (4 mg Intravenous Given 7/8/25 1024)       New Prescriptions    No medications on file               Scribe Attestation:   Scribe #1: I performed the above scribed service and the documentation accurately describes the services I performed. I attest to the accuracy of the note.     Attending:   Physician Attestation Statement for Scribe #1: I, Pratik Nova MD, personally performed the services described in this documentation, as scribed by Karena Arechiga, in my presence, and it is both accurate and complete.           Clinical Impression       ICD-10-CM ICD-9-CM   1. Closed displaced subtrochanteric fracture of left femur, initial encounter  S72.22XA 820.22   2. Pre-op evaluation  Z01.818 V72.84   3. Leg pain  M79.606 729.5   4. Closed displaced intertrochanteric fracture of left femur, initial encounter  S72.142A 820.21       Disposition:   Disposition: Admitted  Condition: Stable         Pratik Nova MD  07/08/25 1045

## 2025-07-08 NOTE — ED NOTES
Bed: 05  Expected date:   Expected time:   Means of arrival: Ambulance Service  Comments:  When clean

## 2025-07-08 NOTE — ED NOTES
Gave patient a urine cup and informed them that a urine sample is needed for collection. Patient verbalized understanding.

## 2025-07-08 NOTE — H&P
[facility]  [facility] Medicine  History & Physical    Patient Name: Roland Lopez  MRN: [id number]  Patient Class: IP- Inpatient  Admission Date: 7/8/2025  Attending Physician: Tram Sykes MD   Primary Care Provider: Emilee, Primary Doctor         Patient information was obtained from patient, relative(s), past medical records, and ER records.     Subjective:     Principal Problem:Closed nondisplaced subtrochanteric fracture of left femur    Chief Complaint:   Chief Complaint   Patient presents with    Fall     LERN patient: Per EMS, patient tripped and fell; c/o left hip pain; received Fentanyl 100 mcg prior to arrival to ED; + use of blood thinners        HPI: 60 y.o. male with a PMHx of HTN, EVELYN, DM, HLD, and paroxysmal A fib who presented to the Emergency Department for evaluation of a fall which occurred this morning. Pt state he was getting ready for work when he slipped on what he thinks was cat urine in his home. Pt tried to use the installed railings in his home but could not catch himself. Symptoms are constant and moderate in severity. No mitigating or exacerbating factors reported. Associated sxs include left leg pain and left hip pain. Patient denies any nausea or LOC, numbness or tingling. No prior Tx specified. Pt reports he takes eliquis. No further complaints or concerns at this time.   ED course: H/H: 13.3/40.4, Plt 157K, glucose 280, PTT: Wnl, PT/INR:WNL. Given 4mg Morphine for pain in ED.     Left Femur xray, Pelvic xray: There is an acute appearing fracture in the trochanteric portion of the left femur.   CXR: no acute findings, CT head: There is no calvarial fracture or intracranial hemorrhage, other findings insignificant  Consulted Orthopedic Surgery, recommended admission for surgery later today.  I note some minor lab abnormalities that have been stable over time, these are of doubtful clinical significance.       Patient is a full code.   After evaluating the patients  clinical presentation, vitals, labs, and risk factors, the attending physician and I decided to admit the patient for monitoring, diagnostics, and treatment due to the risk of deterioration if managed outpatient   Admitted to  for management of Left Femur Fracture.     Past Medical History:   Diagnosis Date    Diabetes mellitus     Hyperlipidemia     Hypertension     EVELYN (obstructive sleep apnea)     Paroxysmal atrial fibrillation        Past Surgical History:   Procedure Laterality Date    CYSTOSCOPY W/ RETROGRADES Right 10/9/2024    Procedure: CYSTOSCOPY, WITH RETROGRADE PYELOGRAM;  Surgeon: Shawn Otto MD;  Location: [facility] OR;  Service: Urology;  Laterality: Right;    TRANSESOPHAGEAL ECHOCARDIOGRAM WITH POSSIBLE CARDIOVERSION (SOFYA W/ POSS CARDIOVERSION) N/A 6/27/2023    Procedure: Transesophageal echo (SOFYA) intra-procedure log documentation;  Surgeon: Santy Martinez MD;  Location: [facility] CATH LAB;  Service: Cardiology;  Laterality: N/A;       Review of patient's allergies indicates:   Allergen Reactions    1 plus 1-f Rash    Sulfa (sulfonamide antibiotics) Rash       No current facility-administered medications on file prior to encounter.     Current Outpatient Medications on File Prior to Encounter   Medication Sig    amLODIPine (NORVASC) 10 MG tablet Take 1 tablet by mouth once daily.    apixaban (ELIQUIS) 5 mg Tab Take 1 tablet (5 mg total) by mouth 2 (two) times daily.    atorvastatin (LIPITOR) 40 MG tablet Take 20 mg by mouth.    calcipotriene (DOVONOX) 0.005 % cream APPLY MODERATE AMOUNT TOPICALLY TWICE A DAY FOR PLAQUE PSORIASIS    carvediloL (COREG) 6.25 MG tablet Take 1 tablet (6.25 mg total) by mouth 2 (two) times daily with meals.    cholecalciferol, vitamin D3, (VITAMIN D3) 50 mcg (2,000 unit) Tab Take 50 mcg by mouth.    cyanocobalamin (VITAMIN B-12) 1000 MCG tablet Take 1 tablet by mouth once daily.    flecainide (TAMBOCOR) 50 MG Tab Take 1 tablet (50 mg total) by mouth every 12 (twelve)  hours.    insulin glargine-yfgn 100 unit/mL Soln Inject 26 Units into the skin every evening.    metFORMIN (GLUCOPHAGE) 1000 MG tablet Take 1,000 mg by mouth.    semaglutide (OZEMPIC) 0.25 mg or 0.5 mg (2 mg/3 mL) pen injector Inject into the skin.    HYDROcodone-acetaminophen (NORCO)  mg per tablet Take 1 tablet by mouth every 6 (six) hours as needed.    ondansetron (ZOFRAN) 4 MG tablet Take 1 tablet (4 mg total) by mouth every 6 (six) hours as needed for Nausea.    tamsulosin (FLOMAX) 0.4 mg Cap Take 1 capsule (0.4 mg total) by mouth once daily.    [DISCONTINUED] lisinopriL-hydrochlorothiazide (PRINZIDE,ZESTORETIC) 20-12.5 mg per tablet Take 2 tablets by mouth once daily.    [DISCONTINUED] magnesium oxide 420 mg Tab Take 420 mg by mouth once daily.     Family History    None       Tobacco Use    Smoking status: Never    Smokeless tobacco: Former     Quit date: 06/2024   Substance and Sexual Activity    Alcohol use: Not Currently     Alcohol/week: 6.0 standard drinks of alcohol     Types: 6 Cans of beer per week     Comment: 6 cans of beer on Saturdays    Drug use: Never    Sexual activity: Yes     Partners: Female     Review of Systems   Musculoskeletal:  Positive for gait problem and joint swelling.   All other systems reviewed and are negative.    Objective:     Vital Signs (Most Recent):  Temp: 97.8 °F (36.6 °C) (07/08/25 0725)  Pulse: 74 (07/08/25 1005)  Resp: 18 (07/08/25 1024)  BP: (!) 144/77 (07/08/25 1005)  SpO2: 96 % (07/08/25 1005) Vital Signs (24h Range):  Temp:  [97.8 °F (36.6 °C)] 97.8 °F (36.6 °C)  Pulse:  [57-76] 74  Resp:  [16-19] 18  SpO2:  [95 %-97 %] 96 %  BP: (144-170)/(65-91) 144/77     Weight: (!) 152.9 kg (337 lb) (please obtain weight)  Body mass index is 43.27 kg/m².     Physical Exam  Vitals and nursing note reviewed.   Constitutional:       General: He is not in acute distress.     Appearance: He is well-developed. He is not diaphoretic.   HENT:      Head: Normocephalic and  atraumatic.      Right Ear: Hearing and external ear normal.      Left Ear: Hearing and external ear normal.      Nose: Nose normal. No mucosal edema or rhinorrhea.      Mouth/Throat:      Pharynx: Uvula midline.   Eyes:      General:         Right eye: No discharge.         Left eye: No discharge.      Conjunctiva/sclera: Conjunctivae normal.      Right eye: No chemosis.     Left eye: No chemosis.     Pupils: Pupils are equal, round, and reactive to light.   Neck:      Thyroid: No thyroid mass or thyromegaly.      Trachea: Trachea normal.   Cardiovascular:      Rate and Rhythm: Normal rate and regular rhythm.      Pulses:           Dorsalis pedis pulses are 2+ on the right side and 2+ on the left side.      Heart sounds: Normal heart sounds. No murmur heard.  Pulmonary:      Effort: Pulmonary effort is normal. No respiratory distress.      Breath sounds: Normal breath sounds. No decreased breath sounds or wheezing.   Abdominal:      General: Bowel sounds are normal. There is no distension.      Palpations: Abdomen is soft.      Tenderness: There is no abdominal tenderness.   Musculoskeletal:         General: Normal range of motion.      Cervical back: Normal range of motion and neck supple.      Comments: Left leg: LROM, +pedal pulses, NVI   Lymphadenopathy:      Cervical: No cervical adenopathy.      Upper Body:      Right upper body: No supraclavicular adenopathy.      Left upper body: No supraclavicular adenopathy.   Skin:     General: Skin is warm and dry.      Capillary Refill: Capillary refill takes less than 2 seconds.      Findings: No rash.   Neurological:      Mental Status: He is alert and oriented to person, place, and time.   Psychiatric:         Mood and Affect: Mood is not anxious.         Speech: Speech normal.         Behavior: Behavior normal.         Thought Content: Thought content normal.         Judgment: Judgment normal.              CRANIAL NERVES     CN III, IV, VI   Pupils are equal,  round, and reactive to light.       Significant Labs: All pertinent labs within the past 24 hours have been reviewed.  CBC:   Recent Labs   Lab 07/08/25  0739   WBC 5.98   HGB 13.3*   HCT 40.4        CMP:   Recent Labs   Lab 07/08/25  0739      K 3.8      CO2 23   *   BUN 12   CREATININE 0.7   CALCIUM 8.9   PROT 6.9   ALBUMIN 3.4*   BILITOT 0.7   ALKPHOS 57   AST 15   ALT 26   ANIONGAP 9       Significant Imaging: I have reviewed all pertinent imaging results/findings within the past 24 hours.  Assessment/Plan:     Assessment & Plan  Closed nondisplaced subtrochanteric fracture of left femur  Patient had a fall at home PTA, xray left femur: There is an acute appearing fracture in the intertrochanteric portion of the left femur.     --Orthopedic surgery consulted  --Planned for surgery on day of admit  --Pain control per MAR  --NPO for now  --PT/OT post-op    Liver cirrhosis  MELD-Na score calculated; MELD 3.0: 7 at 7/8/2025  7:39 AM  MELD-Na: 7 at 7/8/2025  7:39 AM  Calculated from:  Serum Creatinine: 0.7 mg/dL (Using min of 1 mg/dL) at 7/8/2025  7:39 AM  Serum Sodium: 138 mmol/L (Using max of 137 mmol/L) at 7/8/2025  7:39 AM  Total Bilirubin: 0.7 mg/dL (Using min of 1 mg/dL) at 7/8/2025  7:39 AM  Serum Albumin: 3.4 g/dL at 7/8/2025  7:39 AM  INR(ratio): 1.1 at 7/8/2025  7:39 AM  Age at listing (hypothetical): 60 years  Sex: Male at 7/8/2025  7:39 AM      Continue chronic meds. Etiology likely ETOH. Will avoid any hepatotoxic meds, and monitor CBC/CMP/INR for synthetic function.   PAF (paroxysmal atrial fibrillation)  Patient has paroxysmal (<7 days) atrial fibrillation. Patient is currently in sinus rhythm. [id number] Score: 1. The patients heart rate in the last 24 hours is as follows:  Pulse  Min: 57  Max: 77     Antiarrhythmics  flecainide tablet 50 mg, Every 12 hours, Oral    Anticoagulants       Plan  - Replete lytes with a goal of K>4, Mg >2  - Patient is not anticoagulated due to  Eliquis on hold for surgery  - Patient's afib is currently controlled        Primary hypertension  Patient's blood pressure range in the last 24 hours was: BP  Min: 144/77  Max: 170/85.The patient's inpatient anti-hypertensive regimen is listed below:  Current Antihypertensives  carvediloL tablet 6.25 mg, 2 times daily with meals, Oral  hydrALAZINE injection 10 mg, Every 6 hours PRN, Intravenous    Plan  - BP is controlled, no changes needed to their regimen    EVELYN on CPAP  --CPAP @ HS    Insulin dependent type 2 diabetes mellitus  Patient's FSGs are controlled on current medication regimen.  Last A1c reviewed-   Lab Results   Component Value Date    HGBA1C 11.7 (H) 10/09/2024     Most recent fingerstick glucose reviewed-   Recent Labs   Lab 07/08/25  1058   POCTGLUCOSE 277*     Current correctional scale  Medium  Maintain anti-hyperglycemic dose as follows-   Antihyperglycemics (From admission, onward)      Start     Stop Route Frequency Ordered    07/08/25 2100  insulin glargine U-100 (Lantus) pen 13 Units         -- SubQ Nightly 07/08/25 1052    07/08/25 1151  insulin aspart U-100 pen 0-10 Units         -- SubQ Before meals & nightly PRN 07/08/25 1052          Hold Oral hypoglycemics while patient is in the hospital.  VTE Risk Mitigation (From admission, onward)           Ordered     IP VTE HIGH RISK PATIENT  Once         07/08/25 1052     Place sequential compression device  Until discontinued         07/08/25 1052     Reason for No Pharmacological VTE Prophylaxis  Once        Question:  Reasons:  Answer:  IV Heparin w/in 24 hrs. Pre or Post-Op    07/08/25 1052                                                Elsa Sykes NP  Department of Hospital Medicine  '[facility] - Emergency Dept.

## 2025-07-08 NOTE — ASSESSMENT & PLAN NOTE
Patient's FSGs are controlled on current medication regimen.  Last A1c reviewed-   Lab Results   Component Value Date    HGBA1C 11.7 (H) 10/09/2024     Most recent fingerstick glucose reviewed-   Recent Labs   Lab 07/08/25  1058   POCTGLUCOSE 277*     Current correctional scale  Medium  Maintain anti-hyperglycemic dose as follows-   Antihyperglycemics (From admission, onward)      Start     Stop Route Frequency Ordered    07/08/25 2100  insulin glargine U-100 (Lantus) pen 13 Units         -- SubQ Nightly 07/08/25 1052    07/08/25 1151  insulin aspart U-100 pen 0-10 Units         -- SubQ Before meals & nightly PRN 07/08/25 1052          Hold Oral hypoglycemics while patient is in the hospital.

## 2025-07-08 NOTE — ANESTHESIA PREPROCEDURE EVALUATION
07/10/2025  Roland Lopez is a 60 y.o., male    **Procedure initially scheduled for 7/8/25 postponed due to patient having taken last Ozempic on Sun 7/6/25*    Patient Active Problem List   Diagnosis    Closed fracture of proximal end of right fibula    Obesity, Class III, BMI 40-49.9 (morbid obesity)    Psoriasis    Moderate ankle sprain, right, initial encounter    Ureteral stone with hydronephrosis    Pyelonephritis    Liver cirrhosis    PAF (paroxysmal atrial fibrillation)    Primary hypertension    Hyperlipidemia    EVELYN on CPAP    Alcohol abuse    Thrombocytopenia    Insulin dependent type 2 diabetes mellitus    Right ureteral stone    Closed nondisplaced subtrochanteric fracture of left femur     Past Surgical History:   Procedure Laterality Date    CYSTOSCOPY W/ RETROGRADES Right 10/9/2024    Procedure: CYSTOSCOPY, WITH RETROGRADE PYELOGRAM;  Surgeon: Shawn Otto MD;  Location: [facility] OR;  Service: Urology;  Laterality: Right;    TRANSESOPHAGEAL ECHOCARDIOGRAM WITH POSSIBLE CARDIOVERSION (SOFYA W/ POSS CARDIOVERSION) N/A 6/27/2023    Procedure: Transesophageal echo (SOFYA) intra-procedure log documentation;  Surgeon: Santy Martinez MD;  Location: [facility] CATH LAB;  Service: Cardiology;  Laterality: N/A;       Pre-op Assessment    I have reviewed the Patient Summary Reports.    I have reviewed the NPO Status.   I have reviewed the Medications.     Review of Systems  Anesthesia Hx:  No problems with previous Anesthesia  No problems reported with previous General anesthesia  History of prior surgery of interest to airway management or planning:  Previous anesthesia: General, MAC         Personal Hx of Anesthesia complications               Unintended Unpleasent Intraoperative Awareness and during MAC / sedation only      Social:  Non-Smoker, Social Alcohol Use Hx of alcohol abuse - no longer abusing ETOH       Hematology/Oncology:  Hematology Normal                                     EENT/Dental:  EENT/Dental Normal           Cardiovascular:     Hypertension    Dysrhythmias atrial fibrillation      hyperlipidemia   ECG has been reviewed. Paroxysmal atrial fibrillation    Normal sinus rhythm   Moderate voltage criteria for LVH, may be normal variant ( R in aVL ,   [place] product )   Borderline Abnormal ECG   No previous ECGs available                              Pulmonary:        Sleep Apnea, CPAP                Renal/:   renal calculi  Right ureteral stone             Hepatic/GI:      Liver Disease,  Liver cirrhosis    Ozempic - last dose 7/6/25 Taking GLP-1 Agonists            Neurological:  Neurology Normal                                      Endocrine:  Diabetes   BMI 44      Morbid Obesity / BMI > 40  Psych:  Psychiatric History   Hx of alcohol abuse                Physical Exam  General: Well nourished, Cooperative, Alert and Oriented    Airway:  Mallampati: II   Mouth Opening: Normal  TM Distance: Normal  Tongue: Normal  Neck ROM: Normal ROM    Dental:  Intact  Patient denies any currently loose or chipped teeth; Patient denies any removable dental appliances        Anesthesia Plan  Type of Anesthesia, risks & benefits discussed:    Anesthesia Type: Gen ETT  Intra-op Monitoring Plan: Standard ASA Monitors  Post Op Pain Control Plan: multimodal analgesia and IV/PO Opioids PRN  Induction:  IV and rapid sequence  Airway Plan: Video, Post-Induction  Informed Consent: Informed consent signed with the Patient and all parties understand the risks and agree with anesthesia plan.  All questions answered.   ASA Score: 3  Day of Surgery Review of History & Physical: H&P Update referred to the surgeon/provider.  Anesthesia Plan Notes: Date/Time: 10/9/2024 4:56 PM     Performed by: Miguel Gardner CRNA  Authorized by: Castro Suarez II, MD    Intubation:     Induction:  Intravenous    Intubated:  Postinduction     Mask Ventilation:  Not attempted    Attempts:  1    Attempted By:  CRNA    Difficult Airway Encountered?: No      Complications:  None    Airway Device:  Supraglottic airway/LMA    Airway Device Size:  4.0    Style/Cuff Inflation:  Cuffed (inflated to minimal occlusive pressure)    Secured at:  The lips    Placement Verified By:  Capnometry    Complicating Factors:  None    Findings Post-Intubation:  BS equal bilateral and atraumatic/condition of teeth unchanged      Ready For Surgery From Anesthesia Perspective.     .      Chemistry        Component Value Date/Time     07/10/2025 0513     10/10/2024 0546    K 4.0 07/10/2025 0513    K 3.5 10/10/2024 0546     07/10/2025 0513     10/10/2024 0546    CO2 26 07/10/2025 0513    CO2 27 10/10/2024 0546    BUN 14 07/10/2025 0513    CREATININE 0.7 07/10/2025 0513     (H) 07/10/2025 0513     (H) 10/10/2024 0546        Component Value Date/Time    CALCIUM 8.7 07/10/2025 0513    CALCIUM 9.0 10/10/2024 0546    ALKPHOS 58 07/10/2025 0513    ALKPHOS 57 10/10/2024 0546    AST 17 07/10/2025 0513    AST 16 10/10/2024 0546    ALT 19 07/10/2025 0513    ALT 19 10/10/2024 0546    BILITOT 1.2 (H) 07/10/2025 0513    BILITOT 0.8 10/10/2024 0546    ESTGFRAFRICA >60 10/30/2016 1012    EGFRNONAA >60 10/30/2016 1012        Lab Results   Component Value Date    WBC 8.45 07/10/2025    HGB 10.9 (L) 07/10/2025    HCT 33.6 (L) 07/10/2025    MCV 85 07/10/2025     (L) 07/10/2025       Sinus bradycardia   Abnormal QRS-T angle, consider primary T wave abnormality   Abnormal ECG   When compared with ECG of 09-JUL-2024 13:49,   Vent. rate has decreased BY  31 BPM   The axis Shifted right   Inverted T waves have replaced nonspecific T wave abnormality in Inferior   leads   Nonspecific T wave abnormality now evident in Lateral leads

## 2025-07-08 NOTE — HPI
60 y.o. male with a PMHx of HTN, EVELYN, DM, HLD, and paroxysmal A fib who presented to the Emergency Department for evaluation of a fall which occurred this morning. Pt state he was getting ready for work when he slipped on what he thinks was cat urine in his home. Pt tried to use the installed railings in his home but could not catch himself. Symptoms are constant and moderate in severity. No mitigating or exacerbating factors reported. Associated sxs include left leg pain and left hip pain. Patient denies any nausea or LOC, numbness or tingling. No prior Tx specified. Pt reports he takes eliquis. No further complaints or concerns at this time.   ED course: H/H: 13.3/40.4, Plt 157K, glucose 280, PTT: Wnl, PT/INR:WNL. Given 4mg Morphine for pain in ED.     Left Femur xray, Pelvic xray: There is an acute appearing fracture in the trochanteric portion of the left femur.   CXR: no acute findings, CT head: There is no calvarial fracture or intracranial hemorrhage, other findings insignificant  Consulted Orthopedic Surgery, recommended admission for surgery later today.  I note some minor lab abnormalities that have been stable over time, these are of doubtful clinical significance.       Patient is a full code.   After evaluating the patients clinical presentation, vitals, labs, and risk factors, the attending physician and I decided to admit the patient for monitoring, diagnostics, and treatment due to the risk of deterioration if managed outpatient   Admitted to  for management of Left Femur Fracture.

## 2025-07-09 LAB
ABSOLUTE EOSINOPHIL (OHS): 0.09 K/UL
ABSOLUTE MONOCYTE (OHS): 0.63 K/UL (ref 0.3–1)
ABSOLUTE NEUTROPHIL COUNT (OHS): 5.98 K/UL (ref 1.8–7.7)
ALBUMIN SERPL BCP-MCNC: 3 G/DL (ref 3.5–5.2)
ALP SERPL-CCNC: 60 UNIT/L (ref 40–150)
ALT SERPL W/O P-5'-P-CCNC: 23 UNIT/L (ref 10–44)
ANION GAP (OHS): 4 MMOL/L (ref 8–16)
AST SERPL-CCNC: 19 UNIT/L (ref 11–45)
BASOPHILS # BLD AUTO: 0.03 K/UL
BASOPHILS NFR BLD AUTO: 0.4 %
BILIRUB SERPL-MCNC: 1 MG/DL (ref 0.1–1)
BUN SERPL-MCNC: 15 MG/DL (ref 6–20)
CALCIUM SERPL-MCNC: 8.1 MG/DL (ref 8.7–10.5)
CHLORIDE SERPL-SCNC: 108 MMOL/L (ref 95–110)
CO2 SERPL-SCNC: 26 MMOL/L (ref 23–29)
CREAT SERPL-MCNC: 0.7 MG/DL (ref 0.5–1.4)
EAG (OHS): 237 MG/DL (ref 68–131)
ERYTHROCYTE [DISTWIDTH] IN BLOOD BY AUTOMATED COUNT: 13.5 % (ref 11.5–14.5)
GFR SERPLBLD CREATININE-BSD FMLA CKD-EPI: >60 ML/MIN/1.73/M2
GLUCOSE SERPL-MCNC: 219 MG/DL (ref 70–110)
HBA1C MFR BLD: 9.9 % (ref 4–5.6)
HCT VFR BLD AUTO: 34.8 % (ref 40–54)
HGB BLD-MCNC: 11.3 GM/DL (ref 14–18)
HOLD SPECIMEN: NORMAL
IMM GRANULOCYTES # BLD AUTO: 0.02 K/UL (ref 0–0.04)
IMM GRANULOCYTES NFR BLD AUTO: 0.3 % (ref 0–0.5)
LYMPHOCYTES # BLD AUTO: 1.05 K/UL (ref 1–4.8)
MAGNESIUM SERPL-MCNC: 1.7 MG/DL (ref 1.6–2.6)
MCH RBC QN AUTO: 27.6 PG (ref 27–31)
MCHC RBC AUTO-ENTMCNC: 32.5 G/DL (ref 32–36)
MCV RBC AUTO: 85 FL (ref 82–98)
NUCLEATED RBC (/100WBC) (OHS): 0 /100 WBC
PLATELET # BLD AUTO: 153 K/UL (ref 150–450)
PMV BLD AUTO: 9.7 FL (ref 9.2–12.9)
POCT GLUCOSE: 172 MG/DL (ref 70–110)
POCT GLUCOSE: 181 MG/DL (ref 70–110)
POCT GLUCOSE: 197 MG/DL (ref 70–110)
POCT GLUCOSE: 218 MG/DL (ref 70–110)
POTASSIUM SERPL-SCNC: 3.9 MMOL/L (ref 3.5–5.1)
PROT SERPL-MCNC: 6.1 GM/DL (ref 6–8.4)
RBC # BLD AUTO: 4.1 M/UL (ref 4.6–6.2)
RELATIVE EOSINOPHIL (OHS): 1.2 %
RELATIVE LYMPHOCYTE (OHS): 13.5 % (ref 18–48)
RELATIVE MONOCYTE (OHS): 8.1 % (ref 4–15)
RELATIVE NEUTROPHIL (OHS): 76.5 % (ref 38–73)
SODIUM SERPL-SCNC: 138 MMOL/L (ref 136–145)
WBC # BLD AUTO: 7.8 K/UL (ref 3.9–12.7)

## 2025-07-09 PROCEDURE — 94761 N-INVAS EAR/PLS OXIMETRY MLT: CPT

## 2025-07-09 PROCEDURE — 94660 CPAP INITIATION&MGMT: CPT

## 2025-07-09 PROCEDURE — 99900035 HC TECH TIME PER 15 MIN (STAT)

## 2025-07-09 PROCEDURE — 85025 COMPLETE CBC W/AUTO DIFF WBC: CPT | Performed by: NURSE PRACTITIONER

## 2025-07-09 PROCEDURE — 83735 ASSAY OF MAGNESIUM: CPT | Performed by: NURSE PRACTITIONER

## 2025-07-09 PROCEDURE — 63600175 PHARM REV CODE 636 W HCPCS: Performed by: NURSE PRACTITIONER

## 2025-07-09 PROCEDURE — 36415 COLL VENOUS BLD VENIPUNCTURE: CPT | Performed by: NURSE PRACTITIONER

## 2025-07-09 PROCEDURE — 21400001 HC TELEMETRY ROOM

## 2025-07-09 PROCEDURE — 94799 UNLISTED PULMONARY SVC/PX: CPT

## 2025-07-09 PROCEDURE — 25000003 PHARM REV CODE 250: Performed by: NURSE PRACTITIONER

## 2025-07-09 PROCEDURE — 99024 POSTOP FOLLOW-UP VISIT: CPT | Mod: ,,, | Performed by: ORTHOPAEDIC SURGERY

## 2025-07-09 PROCEDURE — 80053 COMPREHEN METABOLIC PANEL: CPT | Performed by: NURSE PRACTITIONER

## 2025-07-09 RX ADMIN — FLECAINIDE ACETATE 50 MG: 50 TABLET ORAL at 09:07

## 2025-07-09 RX ADMIN — SODIUM CHLORIDE: 9 INJECTION, SOLUTION INTRAVENOUS at 11:07

## 2025-07-09 RX ADMIN — MORPHINE SULFATE 4 MG: 4 INJECTION INTRAVENOUS at 10:07

## 2025-07-09 RX ADMIN — ATORVASTATIN CALCIUM 20 MG: 10 TABLET, FILM COATED ORAL at 09:07

## 2025-07-09 RX ADMIN — MORPHINE SULFATE 4 MG: 4 INJECTION INTRAVENOUS at 11:07

## 2025-07-09 RX ADMIN — CARVEDILOL 6.25 MG: 6.25 TABLET, FILM COATED ORAL at 07:07

## 2025-07-09 RX ADMIN — CARVEDILOL 6.25 MG: 6.25 TABLET, FILM COATED ORAL at 04:07

## 2025-07-09 RX ADMIN — FLECAINIDE ACETATE 50 MG: 50 TABLET ORAL at 08:07

## 2025-07-09 RX ADMIN — INSULIN ASPART 2 UNITS: 100 INJECTION, SOLUTION INTRAVENOUS; SUBCUTANEOUS at 11:07

## 2025-07-09 RX ADMIN — SODIUM CHLORIDE: 9 INJECTION, SOLUTION INTRAVENOUS at 04:07

## 2025-07-09 RX ADMIN — MORPHINE SULFATE 4 MG: 4 INJECTION INTRAVENOUS at 06:07

## 2025-07-09 RX ADMIN — MORPHINE SULFATE 4 MG: 4 INJECTION INTRAVENOUS at 07:07

## 2025-07-09 NOTE — ASSESSMENT & PLAN NOTE
Patient had a fall at home PTA, xray left femur: There is an acute appearing fracture in the intertrochanteric portion of the left femur.     --Orthopedic surgery consulted  Delayed surgery due to recent glp-1 agonist use  High aspiration risk  Npo pending surgical intervention tomorrow

## 2025-07-09 NOTE — ASSESSMENT & PLAN NOTE
Patient's blood pressure range in the last 24 hours was: BP  Min: 134/67  Max: 165/81.The patient's inpatient anti-hypertensive regimen is listed below:  Current Antihypertensives  carvediloL tablet 6.25 mg, 2 times daily with meals, Oral  hydrALAZINE injection 10 mg, Every 6 hours PRN, Intravenous    Plan  - BP is controlled, no changes needed to their regimen

## 2025-07-09 NOTE — PLAN OF CARE
Problem: Adult Inpatient Plan of Care  Goal: Plan of Care Review  Outcome: Progressing  Goal: Patient-Specific Goal (Individualized)  Outcome: Progressing  Goal: Absence of Hospital-Acquired Illness or Injury  Outcome: Progressing  Goal: Optimal Comfort and Wellbeing  Outcome: Progressing  Goal: Readiness for Transition of Care  Outcome: Progressing     Problem: Bariatric Environmental Safety  Goal: Safety Maintained with Care  Outcome: Progressing     Problem: Wound  Goal: Optimal Coping  Outcome: Progressing  Goal: Optimal Functional Ability  Outcome: Progressing  Goal: Absence of Infection Signs and Symptoms  Outcome: Progressing  Goal: Improved Oral Intake  Outcome: Progressing  Goal: Optimal Pain Control and Function  Outcome: Progressing  Goal: Skin Health and Integrity  Outcome: Progressing  Goal: Optimal Wound Healing  Outcome: Progressing     Problem: Diabetes Comorbidity  Goal: Blood Glucose Level Within Targeted Range  Outcome: Progressing     Problem: Fall Injury Risk  Goal: Absence of Fall and Fall-Related Injury  Outcome: Progressing     Problem: Skin Injury Risk Increased  Goal: Skin Health and Integrity  Outcome: Progressing

## 2025-07-09 NOTE — CONSULTS
Consult received. The medical records were reviewed.     Current admission diagnosis: Closed nondisplaced subtrochanteric fracture of left femur               Met with pt  for diabetes management practices. Reviewed current A1C and target. Reviewed disease process and medical complications associated with uncontrolled diabetes. Encouraged lifestyle management to include weight loss, increased activity, and appropriate diet as a part of diabetes management.    Patient reports he has his medications for diabetes management at home and takes as ordered. [facility] he started on Ozempic injections last year and has lost some weight and decreased his A1C level from October 2024. [facility]         Pt reports having  meter and supplies at home. Provided log with recommended testing times and expected results. Discussed S/S of hypoglycemia. Reviewed appropriate treatment options. Reviewed S/S of hyperglycemia.     Discussed carbohydrate containing foods, 30-60 carbohydrate choice meal plan using pictures; serving sizes, Plate Method, label reading,  and Calorie Severo Mark Anthony. Encouraged to avoid obvious sources of sugar.     Reviewed the diabetes care checklist and the importance of regular follow up with physician. Left diabetes education packet for reference and contact information. Encouraged pt to contact diabetes education team with any questions or concerns.

## 2025-07-09 NOTE — SUBJECTIVE & OBJECTIVE
Interval History: See hospital course for today      Review of Systems   Constitutional:  Positive for activity change and appetite change. Negative for fatigue and fever.   Respiratory:  Negative for shortness of breath.    Neurological:  Positive for weakness.   Psychiatric/Behavioral:  The patient is nervous/anxious.      Objective:     Vital Signs (Most Recent):  Temp: 98.1 °F (36.7 °C) (07/09/25 0731)  Pulse: 77 (07/09/25 0750)  Resp: 18 (07/09/25 0731)  BP: (!) 160/84 (07/09/25 0731)  SpO2: 96 % (07/09/25 0731) Vital Signs (24h Range):  Temp:  [97.6 °F (36.4 °C)-98.4 °F (36.9 °C)] 98.1 °F (36.7 °C)  Pulse:  [72-83] 77  Resp:  [17-18] 18  SpO2:  [96 %-100 %] 96 %  BP: (134-165)/(67-87) 160/84     Weight: (!) 156.6 kg (345 lb 3.9 oz)  Body mass index is 44.33 kg/m².    Intake/Output Summary (Last 24 hours) at 7/9/2025 1012  Last data filed at 7/9/2025 0800  Gross per 24 hour   Intake 2193.88 ml   Output 500 ml   Net 1693.88 ml         Physical Exam  Vitals and nursing note reviewed. Exam conducted with a chaperone present (visitor).   Constitutional:       General: He is not in acute distress.     Appearance: He is morbidly obese. He is ill-appearing. He is not toxic-appearing.   HENT:      Head: Normocephalic and atraumatic.   Cardiovascular:      Rate and Rhythm: Normal rate.      Heart sounds: Murmur heard.   Pulmonary:      Effort: Pulmonary effort is normal. No respiratory distress.   Abdominal:      Palpations: Abdomen is soft.      Tenderness: There is no abdominal tenderness.   Musculoskeletal:         General: Tenderness present.      Comments: Left leg shortened and externally rotated   Skin:     General: Skin is warm.   Neurological:      Mental Status: He is alert and oriented to person, place, and time.      Motor: Weakness present.   Psychiatric:         Mood and Affect: Mood is anxious.         Behavior: Behavior is cooperative.               Significant Labs: All pertinent labs within the past 24  hours have been reviewed.  A1C:   Recent Labs   Lab 07/08/25  1750   HGBA1C 9.9*     CBC:   Recent Labs   Lab 07/08/25  0739 07/09/25  0505   WBC 5.98 7.80   HGB 13.3* 11.3*   HCT 40.4 34.8*    153     CMP:   Recent Labs   Lab 07/08/25  0739 07/09/25  0505    138   K 3.8 3.9    108   CO2 23 26   * 219*   BUN 12 15   CREATININE 0.7 0.7   CALCIUM 8.9 8.1*   PROT 6.9 6.1   ALBUMIN 3.4* 3.0*   BILITOT 0.7 1.0   ALKPHOS 57 60   AST 15 19   ALT 26 23   ANIONGAP 9 4*       Significant Imaging: I have reviewed all pertinent imaging results/findings within the past 24 hours.  CT: I have reviewed all pertinent results/findings within the past 24 hours and my personal findings are:  head without contrast showing maxillary sinusitis but no intracranial hemorrhage   CXR: I have reviewed all pertinent results/findings within the past 24 hours and my personal findings are:  showing moderate cardiomegaly   Xray femur and pelvis with acute intertrochanteric fracture, left

## 2025-07-09 NOTE — PLAN OF CARE
[facility] - Telemetry (Hospital)  Initial Discharge Assessment       Primary Care Provider: No, Primary Doctor  Establishing with [facility], for PCP    Admission Diagnosis: Leg pain [M79.606]  Chest pain [R07.9]  Pre-op evaluation [Z01.818]  Closed displaced intertrochanteric fracture of left femur, initial encounter [S72.142A]  Closed displaced subtrochanteric fracture of left femur, initial encounter [S72.22XA]    Admission Date: 7/8/2025  Expected Discharge Date: Per Attending    Transition of Care Barriers: None    Payor: [employer] / Plan: [facility] [employer] / Product Type: Government /     Extended Emergency Contact Information  Primary Emergency Contact: shahid lopez  Mobile Phone: [phone]  Relation: Father   needed? No  Secondary Emergency Contact: Beverly Garcia  Mobile Phone: [phone]  Relation: Daughter   needed? No    Discharge Plan A: Home with family         [facility] - [facility] [address]  [address] [facility] [facility] [address]  Phone: [phone] Fax: [phone]    [id number]  [place] - [address] AT [facility]  [address]  [place] [address]  Phone: [phone] Fax: [phone]    [id number]  [place] - [address] AT [facility]  [address]  [place] [address]  Phone: [phone] Fax: [phone]      Initial Assessment (most recent)       Adult Discharge Assessment - 07/09/25 0945          Discharge Assessment    Assessment Type Discharge Planning Assessment     Confirmed/corrected address, phone number and insurance Yes     Confirmed Demographics Correct on Facesheet     Source of Information patient;family     Communicated BRANDIE with patient/caregiver Date not available/Unable to determine     People in Home parent(s)     Name(s) of People in Home Shahid Lopez - [facility]     Facility Arrived From: home     Do you  expect to return to your current living situation? Yes     Do you have help at home or someone to help you manage your care at home? Yes     Who are your caregiver(s) and their phone number(s)? Milo savage     Prior to hospitilization cognitive status: Alert/Oriented     Current cognitive status: Alert/Oriented     Walking or Climbing Stairs Difficulty no     Dressing/Bathing Difficulty no     Equipment Currently Used at Home grab bar;CPAP;glucometer     Readmission within 30 days? No     Patient currently being followed by outpatient case management? No     Do you currently have service(s) that help you manage your care at home? No     Do you take prescription medications? Yes     Do you have prescription coverage? Yes     Do you have any problems affording any of your prescribed medications? No     Is the patient taking medications as prescribed? yes     Who is going to help you get home at discharge? Milo savage     How do you get to doctors appointments? car, drives self     Are you on dialysis? No     Do you take coumadin? No     Discharge Plan A Home with family     DME Needed Upon Discharge  none     Discharge Plan discussed with: Patient;Parent(s)     Name(s) and Number(s) Milo savage     Transition of Care Barriers None        Transportation Needs    In the past 12 months, has lack of transportation kept you from medical appointments or from getting medications? No     In the past 12 months, has lack of transportation kept you from meetings, work, or from getting things needed for daily living? No                   Anticipated DC dispo: home with family  Prior Level of Function: independent with ADLs  People in home: Milo savage     Comments:  SW met with patient and father at bedside to introduce role and discuss discharge planning. Patient's father will be help at home and can provide transport at time of discharge. Confirmed demographics, insurance, and  emergency contacts. SW updated Patient's whiteboard with contact information and anticipated DC plan. CM discharge needs depends on hospital progress. SW will continue following to assist with other needs.

## 2025-07-09 NOTE — ASSESSMENT & PLAN NOTE
MELD-Na score calculated; MELD 3.0: 8 at 7/9/2025  5:05 AM  MELD-Na: 7 at 7/9/2025  5:05 AM  Calculated from:  Serum Creatinine: 0.7 mg/dL (Using min of 1 mg/dL) at 7/9/2025  5:05 AM  Serum Sodium: 138 mmol/L (Using max of 137 mmol/L) at 7/9/2025  5:05 AM  Total Bilirubin: 1 mg/dL at 7/9/2025  5:05 AM  Serum Albumin: 3 g/dL at 7/9/2025  5:05 AM  INR(ratio): 1.1 at 7/8/2025  7:39 AM  Age at listing (hypothetical): 60 years  Sex: Male at 7/9/2025  5:05 AM      Continue chronic meds. Etiology likely ETOH. Will avoid any hepatotoxic meds, and monitor CBC/CMP/INR for synthetic function.

## 2025-07-09 NOTE — ASSESSMENT & PLAN NOTE
Patient has paroxysmal (<7 days) atrial fibrillation. Patient is currently in sinus rhythm. [id number] Score: 1. The patients heart rate in the last 24 hours is as follows:  Pulse  Min: 72  Max: 83     Antiarrhythmics  flecainide tablet 50 mg, Every 12 hours, Oral    Anticoagulants       Plan  - Replete lytes with a goal of K>4, Mg >2  - Patient is not anticoagulated due to Eliquis on hold for surgery  - Patient's afib is currently controlled

## 2025-07-09 NOTE — PROGRESS NOTES
[facility] Medicine  Progress Note    Patient Name: Roland Lopez  MRN: [id number]  Patient Class: IP- Inpatient   Admission Date: 7/8/2025  Length of Stay: 1 days  Attending Physician: Tram Sykes MD  Primary Care Provider: Emilee, Primary Doctor        Subjective     Principal Problem:Closed nondisplaced subtrochanteric fracture of left femur        HPI:  60 y.o. male with a PMHx of HTN, EVELYN, DM, HLD, and paroxysmal A fib who presented to the Emergency Department for evaluation of a fall which occurred this morning. Pt state he was getting ready for work when he slipped on what he thinks was cat urine in his home. Pt tried to use the installed railings in his home but could not catch himself. Symptoms are constant and moderate in severity. No mitigating or exacerbating factors reported. Associated sxs include left leg pain and left hip pain. Patient denies any nausea or LOC, numbness or tingling. No prior Tx specified. Pt reports he takes eliquis. No further complaints or concerns at this time.   ED course: H/H: 13.3/40.4, Plt 157K, glucose 280, PTT: Wnl, PT/INR:WNL. Given 4mg Morphine for pain in ED.     Left Femur xray, Pelvic xray: There is an acute appearing fracture in the trochanteric portion of the left femur.   CXR: no acute findings, CT head: There is no calvarial fracture or intracranial hemorrhage, other findings insignificant  Consulted Orthopedic Surgery, recommended admission for surgery later today.  I note some minor lab abnormalities that have been stable over time, these are of doubtful clinical significance.       Patient is a full code.   After evaluating the patients clinical presentation, vitals, labs, and risk factors, the attending physician and I decided to admit the patient for monitoring, diagnostics, and treatment due to the risk of deterioration if managed outpatient   Admitted to  for management of Left Femur Fracture.     Overview/Hospital Course:  7/9  admitted for acute left hip fracture status post slip and fall. Ortho consulted and recommended surgical intervention. Due to recent use of ozempic, surgery for tomorrow after being npo x 24h+h. Complains of pain. Denies any problems with anesthesia in the past.     Interval History: See hospital course for today      Review of Systems   Constitutional:  Positive for activity change and appetite change. Negative for fatigue and fever.   Respiratory:  Negative for shortness of breath.    Neurological:  Positive for weakness.   Psychiatric/Behavioral:  The patient is nervous/anxious.      Objective:     Vital Signs (Most Recent):  Temp: 98.1 °F (36.7 °C) (07/09/25 0731)  Pulse: 77 (07/09/25 0750)  Resp: 18 (07/09/25 0731)  BP: (!) 160/84 (07/09/25 0731)  SpO2: 96 % (07/09/25 0731) Vital Signs (24h Range):  Temp:  [97.6 °F (36.4 °C)-98.4 °F (36.9 °C)] 98.1 °F (36.7 °C)  Pulse:  [72-83] 77  Resp:  [17-18] 18  SpO2:  [96 %-100 %] 96 %  BP: (134-165)/(67-87) 160/84     Weight: (!) 156.6 kg (345 lb 3.9 oz)  Body mass index is 44.33 kg/m².    Intake/Output Summary (Last 24 hours) at 7/9/2025 1012  Last data filed at 7/9/2025 0800  Gross per 24 hour   Intake 2193.88 ml   Output 500 ml   Net 1693.88 ml         Physical Exam  Vitals and nursing note reviewed. Exam conducted with a chaperone present (visitor).   Constitutional:       General: He is not in acute distress.     Appearance: He is morbidly obese. He is ill-appearing. He is not toxic-appearing.   HENT:      Head: Normocephalic and atraumatic.   Cardiovascular:      Rate and Rhythm: Normal rate.      Heart sounds: Murmur heard.   Pulmonary:      Effort: Pulmonary effort is normal. No respiratory distress.   Abdominal:      Palpations: Abdomen is soft.      Tenderness: There is no abdominal tenderness.   Musculoskeletal:         General: Tenderness present.      Comments: Left leg shortened and externally rotated   Skin:     General: Skin is warm.   Neurological:       Mental Status: He is alert and oriented to person, place, and time.      Motor: Weakness present.   Psychiatric:         Mood and Affect: Mood is anxious.         Behavior: Behavior is cooperative.               Significant Labs: All pertinent labs within the past 24 hours have been reviewed.  A1C:   Recent Labs   Lab 07/08/25  1750   HGBA1C 9.9*     CBC:   Recent Labs   Lab 07/08/25  0739 07/09/25  0505   WBC 5.98 7.80   HGB 13.3* 11.3*   HCT 40.4 34.8*    153     CMP:   Recent Labs   Lab 07/08/25  0739 07/09/25  0505    138   K 3.8 3.9    108   CO2 23 26   * 219*   BUN 12 15   CREATININE 0.7 0.7   CALCIUM 8.9 8.1*   PROT 6.9 6.1   ALBUMIN 3.4* 3.0*   BILITOT 0.7 1.0   ALKPHOS 57 60   AST 15 19   ALT 26 23   ANIONGAP 9 4*       Significant Imaging: I have reviewed all pertinent imaging results/findings within the past 24 hours.  CT: I have reviewed all pertinent results/findings within the past 24 hours and my personal findings are:  head without contrast showing maxillary sinusitis but no intracranial hemorrhage   CXR: I have reviewed all pertinent results/findings within the past 24 hours and my personal findings are:  showing moderate cardiomegaly   Xray femur and pelvis with acute intertrochanteric fracture, left      Assessment & Plan  Closed nondisplaced subtrochanteric fracture of left femur  Patient had a fall at home PTA, xray left femur: There is an acute appearing fracture in the intertrochanteric portion of the left femur.     --Orthopedic surgery consulted  Delayed surgery due to recent glp-1 agonist use  High aspiration risk  Npo pending surgical intervention tomorrow    Liver cirrhosis  MELD-Na score calculated; MELD 3.0: 8 at 7/9/2025  5:05 AM  MELD-Na: 7 at 7/9/2025  5:05 AM  Calculated from:  Serum Creatinine: 0.7 mg/dL (Using min of 1 mg/dL) at 7/9/2025  5:05 AM  Serum Sodium: 138 mmol/L (Using max of 137 mmol/L) at 7/9/2025  5:05 AM  Total Bilirubin: 1 mg/dL at 7/9/2025   5:05 AM  Serum Albumin: 3 g/dL at 7/9/2025  5:05 AM  INR(ratio): 1.1 at 7/8/2025  7:39 AM  Age at listing (hypothetical): 60 years  Sex: Male at 7/9/2025  5:05 AM      Continue chronic meds. Etiology likely ETOH. Will avoid any hepatotoxic meds, and monitor CBC/CMP/INR for synthetic function.   PAF (paroxysmal atrial fibrillation)  Patient has paroxysmal (<7 days) atrial fibrillation. Patient is currently in sinus rhythm. [id number] Score: 1. The patients heart rate in the last 24 hours is as follows:  Pulse  Min: 72  Max: 83     Antiarrhythmics  flecainide tablet 50 mg, Every 12 hours, Oral    Anticoagulants       Plan  - Replete lytes with a goal of K>4, Mg >2  - Patient is not anticoagulated due to Eliquis on hold for surgery  - Patient's afib is currently controlled        Primary hypertension  Patient's blood pressure range in the last 24 hours was: BP  Min: 134/67  Max: 165/81.The patient's inpatient anti-hypertensive regimen is listed below:  Current Antihypertensives  carvediloL tablet 6.25 mg, 2 times daily with meals, Oral  hydrALAZINE injection 10 mg, Every 6 hours PRN, Intravenous    Plan  - BP is controlled, no changes needed to their regimen    EVELYN on CPAP  --CPAP @ HS    Insulin dependent type 2 diabetes mellitus  Patient's FSGs are controlled on current medication regimen.  Last A1c reviewed-   Lab Results   Component Value Date    HGBA1C 9.9 (H) 07/08/2025     Most recent fingerstick glucose reviewed-   Recent Labs   Lab 07/08/25  1058 07/08/25  1159 07/08/25  1640 07/08/25  2144   POCTGLUCOSE 277* 276* 204* 182*     Current correctional scale  Medium  Maintain anti-hyperglycemic dose as follows-   Antihyperglycemics (From admission, onward)      Start     Stop Route Frequency Ordered    07/11/25 2100  insulin glargine U-100 (Lantus) pen 13 Units         -- SubQ Nightly 07/08/25 1646    07/08/25 1745  insulin aspart U-100 pen 0-5 Units         -- SubQ Every 6 hours PRN 07/08/25 1646          Hold  Oral hypoglycemics while patient is in the hospital.  Diabetic education  Low sliding scale insulin while npo    VTE Risk Mitigation (From admission, onward)           Ordered     IP VTE HIGH RISK PATIENT  Once         07/08/25 1052     Place sequential compression device  Until discontinued         07/08/25 1052     Reason for No Pharmacological VTE Prophylaxis  Once        Question:  Reasons:  Answer:  IV Heparin w/in 24 hrs. Pre or Post-Op    07/08/25 1052                    Discharge Planning   BRANDIE:      Code Status: Full Code   Medical Readiness for Discharge Date:                            Tram Sykes MD  Department of Hospital Medicine   [facility])

## 2025-07-09 NOTE — PLAN OF CARE
POC reviewed with pt. Pt verbalizes understanding of POC. No questions at this time.  AAOx4.  NSR on cardiac monitor.  Pt remains free of falls.  No complaints at this time.  Safety measures in place. Will continue to monitor.  Informed pt to call for assistance before getting up. Pt verbalizes understanding.  Hourly rounding conducted and chart check completed.

## 2025-07-09 NOTE — ASSESSMENT & PLAN NOTE
Patient's FSGs are controlled on current medication regimen.  Last A1c reviewed-   Lab Results   Component Value Date    HGBA1C 9.9 (H) 07/08/2025     Most recent fingerstick glucose reviewed-   Recent Labs   Lab 07/08/25  1058 07/08/25  1159 07/08/25  1640 07/08/25  2144   POCTGLUCOSE 277* 276* 204* 182*     Current correctional scale  Medium  Maintain anti-hyperglycemic dose as follows-   Antihyperglycemics (From admission, onward)      Start     Stop Route Frequency Ordered    07/11/25 2100  insulin glargine U-100 (Lantus) pen 13 Units         -- SubQ Nightly 07/08/25 1646    07/08/25 1745  insulin aspart U-100 pen 0-5 Units         -- SubQ Every 6 hours PRN 07/08/25 1646          Hold Oral hypoglycemics while patient is in the hospital.  Diabetic education  Low sliding scale insulin while npo   none...

## 2025-07-09 NOTE — HOSPITAL COURSE
7/9 admitted for acute left hip fracture status post slip and fall. Ortho consulted and recommended surgical intervention. Due to recent use of ozempic, surgery for today after being npo x 24h+h. Complains of pain. Denies any problems with anesthesia in the past.   7/10 status post intramedullary maria t insertion for femur fracture. Tolerated well. Physical/occupational therapy when able.  7/11 Transfused 1 u pRbc for post-op anemia with Hgb<7. Renal function concerning for TRACY, started on fluid resuscitation.  7/12:  Transfused another 1u pRbc for post-op anemia with Hgb<7. Renal function improving after started on fluid resuscitation.  PT/OT with recs for high intensity therapy, but patient declined and preferred home health instead  7/13:  Hemoglobin levels remained stable, cautiously resuming home anticoagulation(Eliquis) while trending hemoglobin given concerns for persistent anemia postoperatively requiring multiple blood transfusions. Patient amenable to rehab placement given his debility concerns.  On 7/14/2025, H/H declined to 7/21.6 with PRBCs x 1 unit transfused and iron supplementation initiated. DVT prophylaxis held. Hyperglycemia noted with Lantus BID initiated. CM consulted for Rehab placement. On 7/15/2025, H/H improved post transfusion. RLE edema noted with Eliquis continued at decreased dose.  Awaiting rehab placement. On 7/16/2025, H/H remains stable with no transfusion required.  Surgical dressing stable and intact.  Hyperglycemia noted with insulin adjusted as appropriate. Toe-touch weight-bearing to the left lower extremity with PT/OT for gait training and ADLs continued.  Patient accepted to inpatient rehab.  KUB obtained with results showing bowel gas pattern is normal in appearance with normal appearing amount of fecal material within the colon.  Patient seen and examined in deemed medically stable for discharge to rehab facility.  Medications reconciled with ferrous sulfate, Norco, insulin  sliding scale, Lantus, and Glycolax prescribed.  Eliquis dose decreased in the setting of anemia.  No signs of acute distress witnessed or reported prior to discharge.  Patient instructed to follow up with PCP and orthopedic surgery upon discharge for further evaluation.

## 2025-07-10 LAB
ABSOLUTE EOSINOPHIL (OHS): 0.16 K/UL
ABSOLUTE MONOCYTE (OHS): 0.77 K/UL (ref 0.3–1)
ABSOLUTE NEUTROPHIL COUNT (OHS): 6.47 K/UL (ref 1.8–7.7)
ALBUMIN SERPL BCP-MCNC: 3.1 G/DL (ref 3.5–5.2)
ALP SERPL-CCNC: 58 UNIT/L (ref 40–150)
ALT SERPL W/O P-5'-P-CCNC: 19 UNIT/L (ref 10–44)
ANION GAP (OHS): 6 MMOL/L (ref 8–16)
AST SERPL-CCNC: 17 UNIT/L (ref 11–45)
BASOPHILS # BLD AUTO: 0.03 K/UL
BASOPHILS NFR BLD AUTO: 0.4 %
BILIRUB SERPL-MCNC: 1.2 MG/DL (ref 0.1–1)
BUN SERPL-MCNC: 14 MG/DL (ref 6–20)
CALCIUM SERPL-MCNC: 8.7 MG/DL (ref 8.7–10.5)
CHLORIDE SERPL-SCNC: 105 MMOL/L (ref 95–110)
CO2 SERPL-SCNC: 26 MMOL/L (ref 23–29)
CREAT SERPL-MCNC: 0.7 MG/DL (ref 0.5–1.4)
ERYTHROCYTE [DISTWIDTH] IN BLOOD BY AUTOMATED COUNT: 13.2 % (ref 11.5–14.5)
GFR SERPLBLD CREATININE-BSD FMLA CKD-EPI: >60 ML/MIN/1.73/M2
GLUCOSE SERPL-MCNC: 213 MG/DL (ref 70–110)
HCT VFR BLD AUTO: 26 % (ref 40–54)
HCT VFR BLD AUTO: 33.6 % (ref 40–54)
HGB BLD-MCNC: 10.9 GM/DL (ref 14–18)
HGB BLD-MCNC: 8.5 GM/DL (ref 14–18)
IMM GRANULOCYTES # BLD AUTO: 0.04 K/UL (ref 0–0.04)
IMM GRANULOCYTES NFR BLD AUTO: 0.5 % (ref 0–0.5)
LYMPHOCYTES # BLD AUTO: 0.98 K/UL (ref 1–4.8)
MAGNESIUM SERPL-MCNC: 1.7 MG/DL (ref 1.6–2.6)
MCH RBC QN AUTO: 27.7 PG (ref 27–31)
MCHC RBC AUTO-ENTMCNC: 32.4 G/DL (ref 32–36)
MCV RBC AUTO: 85 FL (ref 82–98)
NUCLEATED RBC (/100WBC) (OHS): 0 /100 WBC
PLATELET # BLD AUTO: 145 K/UL (ref 150–450)
PMV BLD AUTO: 9.7 FL (ref 9.2–12.9)
POCT GLUCOSE: 210 MG/DL (ref 70–110)
POCT GLUCOSE: 211 MG/DL (ref 70–110)
POCT GLUCOSE: 300 MG/DL (ref 70–110)
POCT GLUCOSE: 305 MG/DL (ref 70–110)
POCT GLUCOSE: 310 MG/DL (ref 70–110)
POTASSIUM SERPL-SCNC: 4 MMOL/L (ref 3.5–5.1)
PROT SERPL-MCNC: 6.4 GM/DL (ref 6–8.4)
RBC # BLD AUTO: 3.94 M/UL (ref 4.6–6.2)
RELATIVE EOSINOPHIL (OHS): 1.9 %
RELATIVE LYMPHOCYTE (OHS): 11.6 % (ref 18–48)
RELATIVE MONOCYTE (OHS): 9.1 % (ref 4–15)
RELATIVE NEUTROPHIL (OHS): 76.5 % (ref 38–73)
SODIUM SERPL-SCNC: 137 MMOL/L (ref 136–145)
WBC # BLD AUTO: 8.45 K/UL (ref 3.9–12.7)

## 2025-07-10 PROCEDURE — 63600175 PHARM REV CODE 636 W HCPCS: Performed by: FAMILY MEDICINE

## 2025-07-10 PROCEDURE — 27201423 OPTIME MED/SURG SUP & DEVICES STERILE SUPPLY: Performed by: ORTHOPAEDIC SURGERY

## 2025-07-10 PROCEDURE — 63600175 PHARM REV CODE 636 W HCPCS: Performed by: NURSE ANESTHETIST, CERTIFIED REGISTERED

## 2025-07-10 PROCEDURE — 94799 UNLISTED PULMONARY SVC/PX: CPT

## 2025-07-10 PROCEDURE — 63600175 PHARM REV CODE 636 W HCPCS: Performed by: NURSE PRACTITIONER

## 2025-07-10 PROCEDURE — 36000710: Performed by: ORTHOPAEDIC SURGERY

## 2025-07-10 PROCEDURE — 36000711: Performed by: ORTHOPAEDIC SURGERY

## 2025-07-10 PROCEDURE — 25000003 PHARM REV CODE 250: Performed by: NURSE PRACTITIONER

## 2025-07-10 PROCEDURE — 37000009 HC ANESTHESIA EA ADD 15 MINS: Performed by: ORTHOPAEDIC SURGERY

## 2025-07-10 PROCEDURE — 99231 SBSQ HOSP IP/OBS SF/LOW 25: CPT | Mod: 57,95,, | Performed by: ORTHOPAEDIC SURGERY

## 2025-07-10 PROCEDURE — 83735 ASSAY OF MAGNESIUM: CPT | Performed by: NURSE PRACTITIONER

## 2025-07-10 PROCEDURE — 71000033 HC RECOVERY, INTIAL HOUR: Performed by: ORTHOPAEDIC SURGERY

## 2025-07-10 PROCEDURE — 99900035 HC TECH TIME PER 15 MIN (STAT)

## 2025-07-10 PROCEDURE — 0QS706Z REPOSITION LEFT UPPER FEMUR WITH INTRAMEDULLARY INTERNAL FIXATION DEVICE, OPEN APPROACH: ICD-10-PCS | Performed by: ORTHOPAEDIC SURGERY

## 2025-07-10 PROCEDURE — 37000008 HC ANESTHESIA 1ST 15 MINUTES: Performed by: ORTHOPAEDIC SURGERY

## 2025-07-10 PROCEDURE — P9045 ALBUMIN (HUMAN), 5%, 250 ML: HCPCS | Mod: JZ,TB | Performed by: NURSE ANESTHETIST, CERTIFIED REGISTERED

## 2025-07-10 PROCEDURE — 25000003 PHARM REV CODE 250: Performed by: NURSE ANESTHETIST, CERTIFIED REGISTERED

## 2025-07-10 PROCEDURE — 85014 HEMATOCRIT: CPT | Performed by: STUDENT IN AN ORGANIZED HEALTH CARE EDUCATION/TRAINING PROGRAM

## 2025-07-10 PROCEDURE — 85018 HEMOGLOBIN: CPT | Performed by: STUDENT IN AN ORGANIZED HEALTH CARE EDUCATION/TRAINING PROGRAM

## 2025-07-10 PROCEDURE — 27245 TREAT THIGH FRACTURE: CPT | Mod: AS,LT,, | Performed by: PHYSICIAN ASSISTANT

## 2025-07-10 PROCEDURE — 27100171 HC OXYGEN HIGH FLOW UP TO 24 HOURS

## 2025-07-10 PROCEDURE — 27245 TREAT THIGH FRACTURE: CPT | Mod: LT,,, | Performed by: ORTHOPAEDIC SURGERY

## 2025-07-10 PROCEDURE — 94761 N-INVAS EAR/PLS OXIMETRY MLT: CPT

## 2025-07-10 PROCEDURE — C1769 GUIDE WIRE: HCPCS | Performed by: ORTHOPAEDIC SURGERY

## 2025-07-10 PROCEDURE — C1713 ANCHOR/SCREW BN/BN,TIS/BN: HCPCS | Performed by: ORTHOPAEDIC SURGERY

## 2025-07-10 PROCEDURE — 94660 CPAP INITIATION&MGMT: CPT

## 2025-07-10 PROCEDURE — 63600175 PHARM REV CODE 636 W HCPCS: Performed by: ORTHOPAEDIC SURGERY

## 2025-07-10 PROCEDURE — 80053 COMPREHEN METABOLIC PANEL: CPT | Performed by: NURSE PRACTITIONER

## 2025-07-10 PROCEDURE — 21400001 HC TELEMETRY ROOM

## 2025-07-10 PROCEDURE — 71000039 HC RECOVERY, EACH ADD'L HOUR: Performed by: ORTHOPAEDIC SURGERY

## 2025-07-10 PROCEDURE — 36415 COLL VENOUS BLD VENIPUNCTURE: CPT | Performed by: NURSE PRACTITIONER

## 2025-07-10 PROCEDURE — 25000003 PHARM REV CODE 250: Performed by: ORTHOPAEDIC SURGERY

## 2025-07-10 PROCEDURE — 85025 COMPLETE CBC W/AUTO DIFF WBC: CPT | Performed by: NURSE PRACTITIONER

## 2025-07-10 DEVICE — SCREW 36MM LONG ST: Type: IMPLANTABLE DEVICE | Site: FEMUR | Status: FUNCTIONAL

## 2025-07-10 DEVICE — SCREW CORTEX 4.5 36M: Type: IMPLANTABLE DEVICE | Site: FEMUR | Status: FUNCTIONAL

## 2025-07-10 DEVICE — SCREW CORTEX 4.5 30M: Type: IMPLANTABLE DEVICE | Site: FEMUR | Status: FUNCTIONAL

## 2025-07-10 DEVICE — SCREW CORTEX 4.5 38M: Type: IMPLANTABLE DEVICE | Site: FEMUR | Status: FUNCTIONAL

## 2025-07-10 RX ORDER — FENTANYL CITRATE 50 UG/ML
INJECTION, SOLUTION INTRAMUSCULAR; INTRAVENOUS
Status: DISCONTINUED | OUTPATIENT
Start: 2025-07-10 | End: 2025-07-10

## 2025-07-10 RX ORDER — ALBUMIN HUMAN 50 G/1000ML
SOLUTION INTRAVENOUS
Status: DISCONTINUED | OUTPATIENT
Start: 2025-07-10 | End: 2025-07-10

## 2025-07-10 RX ORDER — ONDANSETRON HYDROCHLORIDE 2 MG/ML
4 INJECTION, SOLUTION INTRAVENOUS DAILY PRN
Status: DISCONTINUED | OUTPATIENT
Start: 2025-07-10 | End: 2025-07-10 | Stop reason: HOSPADM

## 2025-07-10 RX ORDER — SUCCINYLCHOLINE CHLORIDE 20 MG/ML
INJECTION INTRAMUSCULAR; INTRAVENOUS
Status: DISCONTINUED | OUTPATIENT
Start: 2025-07-10 | End: 2025-07-10

## 2025-07-10 RX ORDER — CEFAZOLIN SODIUM 1 G/3ML
2 INJECTION, POWDER, FOR SOLUTION INTRAMUSCULAR; INTRAVENOUS
Status: DISCONTINUED | OUTPATIENT
Start: 2025-07-10 | End: 2025-07-10

## 2025-07-10 RX ORDER — OXYCODONE AND ACETAMINOPHEN 5; 325 MG/1; MG/1
1 TABLET ORAL
Status: DISCONTINUED | OUTPATIENT
Start: 2025-07-10 | End: 2025-07-10 | Stop reason: HOSPADM

## 2025-07-10 RX ORDER — CEFAZOLIN 2 G/1
2 INJECTION, POWDER, FOR SOLUTION INTRAMUSCULAR; INTRAVENOUS
Status: COMPLETED | OUTPATIENT
Start: 2025-07-10 | End: 2025-07-10

## 2025-07-10 RX ORDER — PHENYLEPHRINE HYDROCHLORIDE 10 MG/ML
INJECTION INTRAVENOUS
Status: DISCONTINUED | OUTPATIENT
Start: 2025-07-10 | End: 2025-07-10

## 2025-07-10 RX ORDER — PROPOFOL 10 MG/ML
VIAL (ML) INTRAVENOUS
Status: DISCONTINUED | OUTPATIENT
Start: 2025-07-10 | End: 2025-07-10

## 2025-07-10 RX ORDER — HYDROCODONE BITARTRATE AND ACETAMINOPHEN 5; 325 MG/1; MG/1
1 TABLET ORAL EVERY 4 HOURS PRN
Status: DISCONTINUED | OUTPATIENT
Start: 2025-07-10 | End: 2025-07-16 | Stop reason: HOSPADM

## 2025-07-10 RX ORDER — CHLORHEXIDINE GLUCONATE ORAL RINSE 1.2 MG/ML
10 SOLUTION DENTAL 2 TIMES DAILY
Status: COMPLETED | OUTPATIENT
Start: 2025-07-10 | End: 2025-07-14

## 2025-07-10 RX ORDER — HYDROMORPHONE HYDROCHLORIDE 2 MG/ML
0.2 INJECTION, SOLUTION INTRAMUSCULAR; INTRAVENOUS; SUBCUTANEOUS EVERY 5 MIN PRN
Status: DISCONTINUED | OUTPATIENT
Start: 2025-07-10 | End: 2025-07-10 | Stop reason: HOSPADM

## 2025-07-10 RX ORDER — PROMETHAZINE HYDROCHLORIDE 25 MG/1
25 SUPPOSITORY RECTAL EVERY 6 HOURS PRN
Status: DISCONTINUED | OUTPATIENT
Start: 2025-07-10 | End: 2025-07-16 | Stop reason: HOSPADM

## 2025-07-10 RX ORDER — ACETAMINOPHEN 10 MG/ML
INJECTION, SOLUTION INTRAVENOUS
Status: DISCONTINUED | OUTPATIENT
Start: 2025-07-10 | End: 2025-07-10

## 2025-07-10 RX ORDER — GLUCAGON 1 MG
1 KIT INJECTION
Status: DISCONTINUED | OUTPATIENT
Start: 2025-07-10 | End: 2025-07-10 | Stop reason: HOSPADM

## 2025-07-10 RX ORDER — INSULIN ASPART 100 [IU]/ML
0-10 INJECTION, SOLUTION INTRAVENOUS; SUBCUTANEOUS
Status: DISCONTINUED | OUTPATIENT
Start: 2025-07-10 | End: 2025-07-16 | Stop reason: HOSPADM

## 2025-07-10 RX ORDER — DEXAMETHASONE SODIUM PHOSPHATE 4 MG/ML
INJECTION, SOLUTION INTRA-ARTICULAR; INTRALESIONAL; INTRAMUSCULAR; INTRAVENOUS; SOFT TISSUE
Status: DISCONTINUED | OUTPATIENT
Start: 2025-07-10 | End: 2025-07-10

## 2025-07-10 RX ORDER — LIDOCAINE HYDROCHLORIDE 20 MG/ML
INJECTION, SOLUTION EPIDURAL; INFILTRATION; INTRACAUDAL; PERINEURAL
Status: DISCONTINUED | OUTPATIENT
Start: 2025-07-10 | End: 2025-07-10

## 2025-07-10 RX ORDER — KETAMINE HCL IN 0.9 % NACL 50 MG/5 ML
SYRINGE (ML) INTRAVENOUS
Status: DISCONTINUED | OUTPATIENT
Start: 2025-07-10 | End: 2025-07-10

## 2025-07-10 RX ORDER — ONDANSETRON HYDROCHLORIDE 2 MG/ML
4 INJECTION, SOLUTION INTRAVENOUS EVERY 12 HOURS PRN
Status: DISCONTINUED | OUTPATIENT
Start: 2025-07-10 | End: 2025-07-16 | Stop reason: HOSPADM

## 2025-07-10 RX ORDER — ROCURONIUM BROMIDE 10 MG/ML
INJECTION, SOLUTION INTRAVENOUS
Status: DISCONTINUED | OUTPATIENT
Start: 2025-07-10 | End: 2025-07-10

## 2025-07-10 RX ORDER — CEFAZOLIN SODIUM 1 G/3ML
INJECTION, POWDER, FOR SOLUTION INTRAMUSCULAR; INTRAVENOUS
Status: DISCONTINUED | OUTPATIENT
Start: 2025-07-10 | End: 2025-07-10

## 2025-07-10 RX ORDER — MIDAZOLAM HYDROCHLORIDE 1 MG/ML
INJECTION INTRAMUSCULAR; INTRAVENOUS
Status: DISCONTINUED | OUTPATIENT
Start: 2025-07-10 | End: 2025-07-10

## 2025-07-10 RX ORDER — DEXMEDETOMIDINE HYDROCHLORIDE 100 UG/ML
INJECTION, SOLUTION INTRAVENOUS
Status: DISCONTINUED | OUTPATIENT
Start: 2025-07-10 | End: 2025-07-10

## 2025-07-10 RX ADMIN — LIDOCAINE HYDROCHLORIDE 80 MG: 20 INJECTION, SOLUTION EPIDURAL; INFILTRATION; INTRACAUDAL; PERINEURAL at 07:07

## 2025-07-10 RX ADMIN — PHENYLEPHRINE HYDROCHLORIDE 50 MCG: 10 INJECTION INTRAVENOUS at 08:07

## 2025-07-10 RX ADMIN — PHENYLEPHRINE HYDROCHLORIDE 100 MCG: 10 INJECTION INTRAVENOUS at 08:07

## 2025-07-10 RX ADMIN — FENTANYL CITRATE 50 MCG: 0.05 INJECTION, SOLUTION INTRAMUSCULAR; INTRAVENOUS at 07:07

## 2025-07-10 RX ADMIN — SUGAMMADEX 100 MG: 100 INJECTION, SOLUTION INTRAVENOUS at 10:07

## 2025-07-10 RX ADMIN — ROCURONIUM BROMIDE 15 MG: 10 SOLUTION INTRAVENOUS at 08:07

## 2025-07-10 RX ADMIN — CEFAZOLIN 2 G: 2 INJECTION, POWDER, FOR SOLUTION INTRAMUSCULAR; INTRAVENOUS at 11:07

## 2025-07-10 RX ADMIN — MIDAZOLAM HYDROCHLORIDE 2 MG: 1 INJECTION, SOLUTION INTRAMUSCULAR; INTRAVENOUS at 07:07

## 2025-07-10 RX ADMIN — FENTANYL CITRATE 25 MCG: 0.05 INJECTION, SOLUTION INTRAMUSCULAR; INTRAVENOUS at 09:07

## 2025-07-10 RX ADMIN — MORPHINE SULFATE 4 MG: 4 INJECTION INTRAVENOUS at 02:07

## 2025-07-10 RX ADMIN — DEXMEDETOMIDINE 5 MCG: 200 INJECTION, SOLUTION INTRAVENOUS at 07:07

## 2025-07-10 RX ADMIN — SODIUM CHLORIDE, SODIUM LACTATE, POTASSIUM CHLORIDE, AND CALCIUM CHLORIDE: .6; .31; .03; .02 INJECTION, SOLUTION INTRAVENOUS at 07:07

## 2025-07-10 RX ADMIN — FENTANYL CITRATE 50 MCG: 0.05 INJECTION, SOLUTION INTRAMUSCULAR; INTRAVENOUS at 08:07

## 2025-07-10 RX ADMIN — CEFAZOLIN 2 G: 2 INJECTION, POWDER, FOR SOLUTION INTRAMUSCULAR; INTRAVENOUS at 03:07

## 2025-07-10 RX ADMIN — CHLORHEXIDINE GLUCONATE 0.12% ORAL RINSE 10 ML: 1.2 LIQUID ORAL at 02:07

## 2025-07-10 RX ADMIN — SODIUM CHLORIDE, SODIUM LACTATE, POTASSIUM CHLORIDE, AND CALCIUM CHLORIDE: .6; .31; .03; .02 INJECTION, SOLUTION INTRAVENOUS at 09:07

## 2025-07-10 RX ADMIN — Medication 15 MG: at 09:07

## 2025-07-10 RX ADMIN — PHENYLEPHRINE HYDROCHLORIDE 100 MCG: 10 INJECTION INTRAVENOUS at 09:07

## 2025-07-10 RX ADMIN — ACETAMINOPHEN 1000 MG: 10 INJECTION, SOLUTION INTRAVENOUS at 07:07

## 2025-07-10 RX ADMIN — SODIUM CHLORIDE, SODIUM LACTATE, POTASSIUM CHLORIDE, AND CALCIUM CHLORIDE: .6; .31; .03; .02 INJECTION, SOLUTION INTRAVENOUS at 08:07

## 2025-07-10 RX ADMIN — CHLORHEXIDINE GLUCONATE 0.12% ORAL RINSE 10 ML: 1.2 LIQUID ORAL at 08:07

## 2025-07-10 RX ADMIN — INSULIN ASPART 3 UNITS: 100 INJECTION, SOLUTION INTRAVENOUS; SUBCUTANEOUS at 12:07

## 2025-07-10 RX ADMIN — INSULIN ASPART 4 UNITS: 100 INJECTION, SOLUTION INTRAVENOUS; SUBCUTANEOUS at 09:07

## 2025-07-10 RX ADMIN — DEXAMETHASONE SODIUM PHOSPHATE 4 MG: 4 INJECTION, SOLUTION INTRA-ARTICULAR; INTRALESIONAL; INTRAMUSCULAR; INTRAVENOUS; SOFT TISSUE at 07:07

## 2025-07-10 RX ADMIN — ROCURONIUM BROMIDE 15 MG: 10 SOLUTION INTRAVENOUS at 09:07

## 2025-07-10 RX ADMIN — INSULIN ASPART 2 UNITS: 100 INJECTION, SOLUTION INTRAVENOUS; SUBCUTANEOUS at 05:07

## 2025-07-10 RX ADMIN — MORPHINE SULFATE 4 MG: 4 INJECTION INTRAVENOUS at 03:07

## 2025-07-10 RX ADMIN — ROCURONIUM BROMIDE 20 MG: 10 SOLUTION INTRAVENOUS at 08:07

## 2025-07-10 RX ADMIN — FENTANYL CITRATE 50 MCG: 0.05 INJECTION, SOLUTION INTRAMUSCULAR; INTRAVENOUS at 09:07

## 2025-07-10 RX ADMIN — SUCCINYLCHOLINE CHLORIDE 200 MG: 20 INJECTION, SOLUTION INTRAMUSCULAR; INTRAVENOUS; PARENTERAL at 07:07

## 2025-07-10 RX ADMIN — SUGAMMADEX 300 MG: 100 INJECTION, SOLUTION INTRAVENOUS at 10:07

## 2025-07-10 RX ADMIN — ALBUMIN (HUMAN) 250 ML: 2.5 SOLUTION INTRAVENOUS at 08:07

## 2025-07-10 RX ADMIN — ONDANSETRON 4 MG: 2 INJECTION INTRAMUSCULAR; INTRAVENOUS at 02:07

## 2025-07-10 RX ADMIN — ONDANSETRON 4 MG: 2 INJECTION INTRAMUSCULAR; INTRAVENOUS at 09:07

## 2025-07-10 RX ADMIN — PHENYLEPHRINE HYDROCHLORIDE 50 MCG: 10 INJECTION INTRAVENOUS at 09:07

## 2025-07-10 RX ADMIN — ROCURONIUM BROMIDE 5 MG: 10 SOLUTION INTRAVENOUS at 07:07

## 2025-07-10 RX ADMIN — CEFAZOLIN 3 G: 330 INJECTION, POWDER, FOR SOLUTION INTRAMUSCULAR; INTRAVENOUS at 07:07

## 2025-07-10 RX ADMIN — INSULIN ASPART 8 UNITS: 100 INJECTION, SOLUTION INTRAVENOUS; SUBCUTANEOUS at 05:07

## 2025-07-10 RX ADMIN — PROPOFOL 200 MG: 10 INJECTION, EMULSION INTRAVENOUS at 07:07

## 2025-07-10 RX ADMIN — FLECAINIDE ACETATE 50 MG: 50 TABLET ORAL at 08:07

## 2025-07-10 RX ADMIN — ROCURONIUM BROMIDE 45 MG: 10 SOLUTION INTRAVENOUS at 07:07

## 2025-07-10 RX ADMIN — ALBUMIN (HUMAN) 250 ML: 2.5 SOLUTION INTRAVENOUS at 09:07

## 2025-07-10 NOTE — ASSESSMENT & PLAN NOTE
Patient has paroxysmal (<7 days) atrial fibrillation. Patient is currently in sinus rhythm. [id number] Score: 1. The patients heart rate in the last 24 hours is as follows:  Pulse  Min: 72  Max: 99     Antiarrhythmics  flecainide tablet 50 mg, Every 12 hours, Oral    Anticoagulants       Plan  - Replete lytes with a goal of K>4, Mg >2  - Patient is not anticoagulated due to Eliquis on hold for surgery  - Patient's afib is currently controlled  Continue holding a/c due to drop in hb/hct postoperatively

## 2025-07-10 NOTE — PROGRESS NOTES
Pharmacist Renal Dose Adjustment Note    Roland Lopez is a 60 y.o. male being treated with the medication cefazolin.     Patient Data:    Vital Signs (Most Recent):  Temp: 97.5 °F (36.4 °C) (07/10/25 1200)  Pulse: 88 (07/10/25 1200)  Resp: 16 (07/10/25 1200)  BP: 131/88 (07/10/25 1200)  SpO2: 99 % (07/10/25 1200) Vital Signs (72h Range):  Temp:  [97.5 °F (36.4 °C)-98.9 °F (37.2 °C)]   Pulse:  [57-93]   Resp:  [7-30]   BP: (128-170)/(56-91)   SpO2:  [91 %-100 %]      Recent Labs   Lab 07/08/25  0739 07/09/25  0505 07/10/25  0513   CREATININE 0.7 0.7 0.7     Serum creatinine: 0.7 mg/dL 07/10/25 0513  Estimated creatinine clearance: 180 mL/min    Medication: cefazolin 3 g IV every 8 hours x 2 doses will be changed to cefazolin 2 g IV every 8 hours x 2 doses per pharmacy renal dose adjustment protocol for patients with CrCl greater than 30 mL/min.    Pharmacist's Name: Venus Childress PharmD  Pharmacist's Extension: [phone]     Thank you for allowing us to participate in this patient's care.     Venus Childress PharmD 07/10/2025 12:20 PM

## 2025-07-10 NOTE — ANESTHESIA POSTPROCEDURE EVALUATION
Anesthesia Post Evaluation    Patient: Roland Lopez    Procedure(s) Performed: Procedure(s) (LRB):  INSERTION, INTRAMEDULLARY COOKIE, FEMUR, TROCHANTER (Left)    Final Anesthesia Type: general      Patient location during evaluation: PACU  Patient participation: Yes- Able to Participate  Level of consciousness: responds to stimulation  Post-procedure vital signs: reviewed and stable  Pain management: adequate  Airway patency: patent  EVELYN mitigation strategies: Multimodal analgesia, Extubation while patient is awake, Verification of full reversal of neuromuscular block and Extubation and recovery carried out in lateral, semiupright, or other nonsupine position  PONV status at discharge: No PONV  Anesthetic complications: no      Cardiovascular status: blood pressure returned to baseline and hemodynamically stable  Respiratory status: unassisted and spontaneous ventilation  Hydration status: euvolemic  Follow-up needed (CBC ordered STAT to monitor H/H)               Vitals Value Taken Time   /73 07/10/25 10:34   Temp 97.7 07/10/25 10:36   Pulse 90 07/10/25 10:35   Resp 12 07/10/25 10:35   SpO2 100 % 07/10/25 10:35   Vitals shown include unfiled device data.      No case tracking events are documented in the log.      Pain/Gabby Score: Pain Rating Prior to Med Admin: 10 (7/10/2025  3:09 AM)  Pain Rating Post Med Admin: 3 (7/10/2025  3:39 AM)

## 2025-07-10 NOTE — PROGRESS NOTES
Spoke with patient this morning and reviewed the risks and benefits of surgery    I answered all of his questions at length and he is ready to proceed with IM rodding of his left hip

## 2025-07-10 NOTE — ASSESSMENT & PLAN NOTE
Patient's blood pressure range in the last 24 hours was: BP  Min: 128/75  Max: 161/76.The patient's inpatient anti-hypertensive regimen is listed below:  Current Antihypertensives  carvediloL tablet 6.25 mg, 2 times daily with meals, Oral  hydrALAZINE injection 10 mg, Every 6 hours PRN, Intravenous    Plan  - BP is controlled, no changes needed to their regimen

## 2025-07-10 NOTE — PROGRESS NOTES
Checked in on patient tonight as he was complaining of numbness in his foot while waking up in the PACU.  Sensation is intact on the dorsal and plantar aspect of his foot.  He acts like some weakness with dorsiflexion but demonstrates eversion, inversion, and plantar flexion.  We had significant amount of traction on the leg during surgery and believe this to be just a palsy as he does demonstrate intact function.  We will recheck in a.m.

## 2025-07-10 NOTE — ANESTHESIA PROCEDURE NOTES
Intubation    Date/Time: 7/10/2025 7:10 AM    Performed by: Gemma Hernandez CRNA  Authorized by: Osito Flores MD    Intubation:     Induction:  Rapid sequence induction    Intubated:  Postinduction    Mask Ventilation:  Not attempted    Attempts:  1    Attempted By:  CRNA    Method of Intubation:  Video laryngoscopy    Blade:  Mclain 4    Laryngeal View Grade: Grade I - full view of cords      Difficult Airway Encountered?: No      Complications:  None    Airway Device:  Oral endotracheal tube    Airway Device Size:  8.0    Style/Cuff Inflation:  Cuffed    Tube secured:  22    Secured at:  The lips    Placement Verified By:  Capnometry    Complicating Factors:  None    Findings Post-Intubation:  BS equal bilateral and atraumatic/condition of teeth unchanged

## 2025-07-10 NOTE — PROGRESS NOTES
P.T COMPLETED CHART REVIEW AND SPOKE WITH NURSE ADY  PT REPORTED:   LIVES AT HOME WITH FATHER AND FIANCE IN A 1ST FLOOR APT WITH RAILINGS ALL THROUGHOUT HOME.  PT WAS IND, DRIVES AND WORKS AT A [employer]  PT HAS SPC, BUT DOESN'T USE AND REPORTED NUMBNESS ON L DORAL TOES.  DR. FRITZ BLAIR ENTERED ROOM AND REQUESTED TO HOLD EVAL AND TX D/T PT EXTENSIVE SX TODAY.   P.T. TO COMPLETE EVAL NEXT VISIT. THANK YOU Jessenia Parson, PT 7/10/2025 16:15-16:30

## 2025-07-10 NOTE — PROGRESS NOTES
[facility] Medicine  Progress Note    Patient Name: Roland Lopez  MRN: [id number]  Patient Class: IP- Inpatient   Admission Date: 7/8/2025  Length of Stay: 2 days  Attending Physician: Tram Sykes MD  Primary Care Provider: Emilee, Primary Doctor        Subjective     Principal Problem:Closed nondisplaced subtrochanteric fracture of left femur        HPI:  60 y.o. male with a PMHx of HTN, EVELYN, DM, HLD, and paroxysmal A fib who presented to the Emergency Department for evaluation of a fall which occurred this morning. Pt state he was getting ready for work when he slipped on what he thinks was cat urine in his home. Pt tried to use the installed railings in his home but could not catch himself. Symptoms are constant and moderate in severity. No mitigating or exacerbating factors reported. Associated sxs include left leg pain and left hip pain. Patient denies any nausea or LOC, numbness or tingling. No prior Tx specified. Pt reports he takes eliquis. No further complaints or concerns at this time.   ED course: H/H: 13.3/40.4, Plt 157K, glucose 280, PTT: Wnl, PT/INR:WNL. Given 4mg Morphine for pain in ED.     Left Femur xray, Pelvic xray: There is an acute appearing fracture in the trochanteric portion of the left femur.   CXR: no acute findings, CT head: There is no calvarial fracture or intracranial hemorrhage, other findings insignificant  Consulted Orthopedic Surgery, recommended admission for surgery later today.  I note some minor lab abnormalities that have been stable over time, these are of doubtful clinical significance.       Patient is a full code.   After evaluating the patients clinical presentation, vitals, labs, and risk factors, the attending physician and I decided to admit the patient for monitoring, diagnostics, and treatment due to the risk of deterioration if managed outpatient   Admitted to  for management of Left Femur Fracture.     Overview/Hospital Course:  7/9  admitted for acute left hip fracture status post slip and fall. Ortho consulted and recommended surgical intervention. Due to recent use of ozempic, surgery for today after being npo x 24h+h. Complains of pain. Denies any problems with anesthesia in the past.   7/10 status post intermedullary maria t insertion for femur fracture. Tolerated well. Physical/occupational therapy when able.    Interval History: See hospital course for today      Review of Systems   Constitutional:  Positive for activity change and fatigue.   Skin:  Positive for wound.   Neurological:  Positive for weakness.   Psychiatric/Behavioral:  Negative for sleep disturbance. The patient is nervous/anxious.      Objective:     Vital Signs (Most Recent):  Temp: 97.5 °F (36.4 °C) (07/10/25 1200)  Pulse: 99 (07/10/25 1458)  Resp: 16 (07/10/25 1455)  BP: 131/88 (07/10/25 1200)  SpO2: 99 % (07/10/25 1200) Vital Signs (24h Range):  Temp:  [97.5 °F (36.4 °C)-98.9 °F (37.2 °C)] 97.5 °F (36.4 °C)  Pulse:  [72-99] 99  Resp:  [7-30] 16  SpO2:  [91 %-100 %] 99 %  BP: (128-161)/(56-88) 131/88     Weight: (!) 156.6 kg (345 lb 3.9 oz)  Body mass index is 44.33 kg/m².    Intake/Output Summary (Last 24 hours) at 7/10/2025 1617  Last data filed at 7/10/2025 1033  Gross per 24 hour   Intake 2400 ml   Output 1925 ml   Net 475 ml         Physical Exam  Vitals and nursing note reviewed.   Constitutional:       General: He is sleeping. He is not in acute distress.     Appearance: He is morbidly obese. He is ill-appearing. He is not toxic-appearing.   HENT:      Head: Normocephalic and atraumatic.   Cardiovascular:      Rate and Rhythm: Normal rate.   Pulmonary:      Effort: Pulmonary effort is normal. No respiratory distress.   Abdominal:      Palpations: Abdomen is soft.      Tenderness: There is no abdominal tenderness.   Musculoskeletal:      Right lower leg: No edema.      Left lower leg: No edema.        Legs:    Skin:     General: Skin is warm.      Coloration: Skin is  pale.   Neurological:      Mental Status: He is easily aroused.      Motor: Weakness present.               Significant Labs: All pertinent labs within the past 24 hours have been reviewed.  A1C:   Recent Labs   Lab 07/08/25  1750   HGBA1C 9.9*     CBC:   Recent Labs   Lab 07/09/25  0505 07/10/25  0512 07/10/25  1128   WBC 7.80 8.45  --    HGB 11.3* 10.9* 8.5*   HCT 34.8* 33.6* 26.0*    145*  --      CMP:   Recent Labs   Lab 07/09/25  0505 07/10/25  0513    137   K 3.9 4.0    105   CO2 26 26   * 213*   BUN 15 14   CREATININE 0.7 0.7   CALCIUM 8.1* 8.7   PROT 6.1 6.4   ALBUMIN 3.0* 3.1*   BILITOT 1.0 1.2*   ALKPHOS 60 58   AST 19 17   ALT 23 19   ANIONGAP 4* 6*     POCT Glucose:   Recent Labs   Lab 07/10/25  0530 07/10/25  0658 07/10/25  1209   POCTGLUCOSE 211* 210* 300*       Significant Imaging: I have reviewed all pertinent imaging results/findings within the past 24 hours.  Xray femur postop with hardware in place      Assessment & Plan  Closed nondisplaced subtrochanteric fracture of left femur  Patient had a fall at home PTA, xray left femur: There is an acute appearing fracture in the intertrochanteric portion of the left femur.     Status post intramedullary maria t insertion per ortho  Physical/occupational therapy when able  Hb/hct drop   Hold ac   Type and screen   Monitor vitals     Liver cirrhosis  MELD-Na score calculated; MELD 3.0: 8 at 7/10/2025  5:13 AM  MELD-Na: 8 at 7/10/2025  5:13 AM  Calculated from:  Serum Creatinine: 0.7 mg/dL (Using min of 1 mg/dL) at 7/10/2025  5:13 AM  Serum Sodium: 137 mmol/L at 7/10/2025  5:13 AM  Total Bilirubin: 1.2 mg/dL at 7/10/2025  5:13 AM  Serum Albumin: 3.1 g/dL at 7/10/2025  5:13 AM  INR(ratio): 1.1 at 7/8/2025  7:39 AM  Age at listing (hypothetical): 60 years  Sex: Male at 7/10/2025  5:13 AM      Continue chronic meds. Etiology likely ETOH. Will avoid any hepatotoxic meds, and monitor CBC/CMP/INR for synthetic function.   PAF (paroxysmal  atrial fibrillation)  Patient has paroxysmal (<7 days) atrial fibrillation. Patient is currently in sinus rhythm. [id number] Score: 1. The patients heart rate in the last 24 hours is as follows:  Pulse  Min: 72  Max: 99     Antiarrhythmics  flecainide tablet 50 mg, Every 12 hours, Oral    Anticoagulants       Plan  - Replete lytes with a goal of K>4, Mg >2  - Patient is not anticoagulated due to Eliquis on hold for surgery  - Patient's afib is currently controlled  Continue holding a/c due to drop in hb/hct postoperatively       Primary hypertension  Patient's blood pressure range in the last 24 hours was: BP  Min: 128/75  Max: 161/76.The patient's inpatient anti-hypertensive regimen is listed below:  Current Antihypertensives  carvediloL tablet 6.25 mg, 2 times daily with meals, Oral  hydrALAZINE injection 10 mg, Every 6 hours PRN, Intravenous    Plan  - BP is controlled, no changes needed to their regimen    EVELYN on CPAP  --CPAP @ HS  Recommended patient bring home unit  At risk for hypercapnia     Insulin dependent type 2 diabetes mellitus  Patient's FSGs are controlled on current medication regimen.  Last A1c reviewed-   Lab Results   Component Value Date    HGBA1C 9.9 (H) 07/08/2025     Most recent fingerstick glucose reviewed-   Recent Labs   Lab 07/09/25  2056 07/10/25  0530 07/10/25  0658 07/10/25  1209   POCTGLUCOSE 172* 211* 210* 300*     Current correctional scale  Medium  Maintain anti-hyperglycemic dose as follows-   Antihyperglycemics (From admission, onward)      Start     Stop Route Frequency Ordered    07/11/25 2100  insulin glargine U-100 (Lantus) pen 13 Units         -- SubQ Nightly 07/08/25 1646    07/08/25 1745  insulin aspart U-100 pen 0-5 Units         -- SubQ Every 6 hours PRN 07/08/25 1646          Hold Oral hypoglycemics while patient is in the hospital.  Diabetic education  Moderate dose sliding scale to promote wound healing  VTE Risk Mitigation (From admission, onward)           Ordered      IP VTE HIGH RISK PATIENT  Once         07/08/25 1052     Place sequential compression device  Until discontinued         07/08/25 1052     Reason for No Pharmacological VTE Prophylaxis  Once        Question:  Reasons:  Answer:  IV Heparin w/in 24 hrs. Pre or Post-Op    07/08/25 1052                    Discharge Planning   BRANDIE:      Code Status: Full Code   Medical Readiness for Discharge Date:   Discharge Plan A: Home with family                    Tram Sykes MD  Department of Hospital Medicine   [facility] ([facility])

## 2025-07-10 NOTE — ASSESSMENT & PLAN NOTE
Patient's FSGs are controlled on current medication regimen.  Last A1c reviewed-   Lab Results   Component Value Date    HGBA1C 9.9 (H) 07/08/2025     Most recent fingerstick glucose reviewed-   Recent Labs   Lab 07/09/25 2056 07/10/25  0530 07/10/25  0658 07/10/25  1209   POCTGLUCOSE 172* 211* 210* 300*     Current correctional scale  Medium  Maintain anti-hyperglycemic dose as follows-   Antihyperglycemics (From admission, onward)      Start     Stop Route Frequency Ordered    07/11/25 2100  insulin glargine U-100 (Lantus) pen 13 Units         -- SubQ Nightly 07/08/25 1646    07/08/25 1745  insulin aspart U-100 pen 0-5 Units         -- SubQ Every 6 hours PRN 07/08/25 1646          Hold Oral hypoglycemics while patient is in the hospital.  Diabetic education  Moderate dose sliding scale to promote wound healing

## 2025-07-10 NOTE — SUBJECTIVE & OBJECTIVE
Interval History: See hospital course for today      Review of Systems   Constitutional:  Positive for activity change and fatigue.   Skin:  Positive for wound.   Neurological:  Positive for weakness.   Psychiatric/Behavioral:  Negative for sleep disturbance. The patient is nervous/anxious.      Objective:     Vital Signs (Most Recent):  Temp: 97.5 °F (36.4 °C) (07/10/25 1200)  Pulse: 99 (07/10/25 1458)  Resp: 16 (07/10/25 1455)  BP: 131/88 (07/10/25 1200)  SpO2: 99 % (07/10/25 1200) Vital Signs (24h Range):  Temp:  [97.5 °F (36.4 °C)-98.9 °F (37.2 °C)] 97.5 °F (36.4 °C)  Pulse:  [72-99] 99  Resp:  [7-30] 16  SpO2:  [91 %-100 %] 99 %  BP: (128-161)/(56-88) 131/88     Weight: (!) 156.6 kg (345 lb 3.9 oz)  Body mass index is 44.33 kg/m².    Intake/Output Summary (Last 24 hours) at 7/10/2025 1617  Last data filed at 7/10/2025 1033  Gross per 24 hour   Intake 2400 ml   Output 1925 ml   Net 475 ml         Physical Exam  Vitals and nursing note reviewed.   Constitutional:       General: He is sleeping. He is not in acute distress.     Appearance: He is morbidly obese. He is ill-appearing. He is not toxic-appearing.   HENT:      Head: Normocephalic and atraumatic.   Cardiovascular:      Rate and Rhythm: Normal rate.   Pulmonary:      Effort: Pulmonary effort is normal. No respiratory distress.   Abdominal:      Palpations: Abdomen is soft.      Tenderness: There is no abdominal tenderness.   Musculoskeletal:        Legs:    Skin:     General: Skin is warm.      Coloration: Skin is pale.   Neurological:      Mental Status: He is easily aroused.      Motor: Weakness present.               Significant Labs: All pertinent labs within the past 24 hours have been reviewed.  A1C:   Recent Labs   Lab 07/08/25  1750   HGBA1C 9.9*     CBC:   Recent Labs   Lab 07/09/25  0505 07/10/25  0512 07/10/25  1128   WBC 7.80 8.45  --    HGB 11.3* 10.9* 8.5*   HCT 34.8* 33.6* 26.0*    145*  --      CMP:   Recent Labs   Lab 07/09/25  9967  07/10/25  0513    137   K 3.9 4.0    105   CO2 26 26   * 213*   BUN 15 14   CREATININE 0.7 0.7   CALCIUM 8.1* 8.7   PROT 6.1 6.4   ALBUMIN 3.0* 3.1*   BILITOT 1.0 1.2*   ALKPHOS 60 58   AST 19 17   ALT 23 19   ANIONGAP 4* 6*     POCT Glucose:   Recent Labs   Lab 07/10/25  0530 07/10/25  0658 07/10/25  1209   POCTGLUCOSE 211* 210* 300*       Significant Imaging: I have reviewed all pertinent imaging results/findings within the past 24 hours.  Xray femur postop with hardware in place

## 2025-07-10 NOTE — OP NOTE
OP NOTE:      Date of Procedure: 7/10/2025     Pre-op Diagnosis: Closed displaced subtrochanteric fracture of left femur, initial encounter [S72.22XA]     Post-op Diagnosis: Post-Op Diagnosis Codes:     * Closed displaced subtrochanteric fracture of left femur, initial encounter [S72.22XA]     Procedure Performed: Procedure(s):  INSERTION, INTRAMEDULLARY COOKIE, FEMUR, TROCHANTER (Left)    Surgeon/Assistants: Surgeons and Role:     * Kodi Frye Jr., MD - Primary    Assistant: Shiraz Judd PA-C  he was present throughout the entire procedure and was involved with the preparation of the patient, draping, and was essential to the flow of the procedure itself so while retracting, closing, and assisting in all phases.  He also assisted in putting on dressings and helping get the patient to recovery room safely         Anesthesia: General     Estimated Blood Loss:  500    Drains:  None    Specimen: * No specimens in log *    Complications:  None    Condition of Patient:  Stable         HISTORY/INDICATIONS  Morbidly obese gentleman who had a fall and has left proximal femur fracture in the subtrochanteric region extending into the inner troch region.  Risks and benefits of surgery were discussed with him at length he understood this well and consented to proceed    DESCRIPTION OF PROCEDURE:    Patient was taken to the operating room where general anesthetic was administered we prepped and draped the lower extremity in the usual sterile manner in her.  Despite his morbid obesity and the proximal femur fracture I still thought he was a candidate for IM rodding.  He was placed on the fracture table and attempts at closed reduction were difficult even with a lot of traction due to the patient's size and fracture location.  Adducting the leg around the post further displaced the fracture.  I ended up opening proximally to try to reduce the fracture so that we could get a guidewire down but due to his morbid obesity that  prevented that.  At that point I decided to switch gears to a dynamic hip screw.  We used the Synthes set with a 8 hole side plate reducing the fracture as well as we could there was quite a bit of comminution proximally.  We used 145 degree angle plate in order to try to get as many screws distal to the fracture as possible and attempted to compress the fracture at the end but it just did not compress much.  We irrigated thoroughly with saline closed the fascia omid with 1. Vicryl suture subcutaneous tissues with 3-0 Vicryl suture and the skin with staples.    This procedure took more than 5 times longer than normal due to the patient's morbid obesity.  This increased the difficulty in positioning, anesthesia, and the actual procedure itself adding significant amount of time as well as difficulty.

## 2025-07-10 NOTE — TRANSFER OF CARE
"Anesthesia Transfer of Care Note    Patient: Roland Lopez    Procedure(s) Performed: Procedure(s) (LRB):  INSERTION, INTRAMEDULLARY COOKIE, FEMUR, TROCHANTER (Left)    Patient location: OPS    Anesthesia Type: general    Transport from OR: Transported from OR on room air with adequate spontaneous ventilation. Upon arrival to PACU/ICU, patient attached to 100% O2 by T-piece with adequate spontaneous ventilation    Post pain: adequate analgesia    Post assessment: no apparent anesthetic complications and tolerated procedure well    Post vital signs: stable    Level of consciousness: responds to stimulation and awake    Nausea/Vomiting: no nausea/vomiting    Complications: none    Transfer of care protocol was followed      Last vitals: Visit Vitals  BP (!) 156/73 (BP Location: Left arm, Patient Position: Lying)   Pulse 76   Temp 36.7 °C (98 °F) (Axillary)   Resp 18   Ht 6' 2" (1.88 m)   Wt (!) 156.6 kg (345 lb 3.9 oz)   SpO2 (!) 94%   BMI 44.33 kg/m²     "

## 2025-07-10 NOTE — PT/OT/SLP PROGRESS
Occupational Therapy      Patient Name:  Roland Lopez   MRN:  [id number]    OT eloisa initiated via chart review and attempted. Therapist completed occupational profile and began to prepare for bed mobility. Dr. Uday BLAIR arrived at this time and requested to hold OT eval d/t pt's extensive surgery. Pt reports 7/10 LLE pain and numbness to L dorsal toes. Pt is LLE TTWB.   Will follow-up at a later date.    Occupational Profile:  Living Environment: lives with father and fiance in a 1st floor apartment with threshold to enter. Tub/shower combo.  Previous level of function: Pt (I) with ADLs and functional mobility.  Roles and Routines: drives and works at a car [employer]  Equipment Used at Home:  grab bar, wall railings throughout home  Assistance upon Discharge: unknown    7/10/2025  Kristal Marley OT   [phone]

## 2025-07-10 NOTE — ASSESSMENT & PLAN NOTE
Patient had a fall at home PTA, xray left femur: There is an acute appearing fracture in the intertrochanteric portion of the left femur.     Status post intramedullary maria t insertion per ortho  Physical/occupational therapy when able  Hb/hct drop   Hold ac   Type and screen   Monitor vitals

## 2025-07-10 NOTE — ASSESSMENT & PLAN NOTE
MELD-Na score calculated; MELD 3.0: 8 at 7/10/2025  5:13 AM  MELD-Na: 8 at 7/10/2025  5:13 AM  Calculated from:  Serum Creatinine: 0.7 mg/dL (Using min of 1 mg/dL) at 7/10/2025  5:13 AM  Serum Sodium: 137 mmol/L at 7/10/2025  5:13 AM  Total Bilirubin: 1.2 mg/dL at 7/10/2025  5:13 AM  Serum Albumin: 3.1 g/dL at 7/10/2025  5:13 AM  INR(ratio): 1.1 at 7/8/2025  7:39 AM  Age at listing (hypothetical): 60 years  Sex: Male at 7/10/2025  5:13 AM      Continue chronic meds. Etiology likely ETOH. Will avoid any hepatotoxic meds, and monitor CBC/CMP/INR for synthetic function.

## 2025-07-10 NOTE — CONSULTS
[facility] - Telemetry ([facility])  Wound Care    Patient Name:  Roland Lopez   MRN:  [id number]  Date: 7/10/2025  Diagnosis: Closed nondisplaced subtrochanteric fracture of left femur    History:     Past Medical History:   Diagnosis Date    Diabetes mellitus     Hyperlipidemia     Hypertension     EVELYN (obstructive sleep apnea)     Paroxysmal atrial fibrillation        Social History[1]    Precautions:     Allergies as of 07/08/2025 - Reviewed 07/08/2025   Allergen Reaction Noted    1 plus 1-f Rash 06/23/2009    Sulfa (sulfonamide antibiotics) Rash 06/01/2021       [facility] Assessment Details/Treatment     Consulted on this 61 y/o male patient for wounds to back, lower abdomen, and RLE. Patient was admitted 7/8/25 for closed nondisplaced subtrochanteric fracture of left femur, underwent intramedullary maria t insertion to left femur on 7/10/25 per Dr. Frye with orthopedic surgery. PMH significant for HTN, EVELYN, DM, HLD, and paroxysmal A fib.     Patient resting in bed, awake and alert. Family came into room while wound care nurse in room. Wound care nurse gave introduction and reason for visit.     --Noted scattered dry flaky patches to the BLE and bilateral elbows. Patient has history of psoriasis, states he has cream at home that he applies.      --Gently lifted blankets and noted intertrigo to the abdominal fold. Skin very moist remains intact with redness noted. Cleansed with bath wipes. Patted dry. Applied strips of interdry into folds with 2 inch tail sticking out.   --Assisted patient to turn onto Left side. Noted what appears to be a few intact fluid filled blisters along the right flank as well as scattered ruptured blisters along the back and linear red areas in strange pattern, some blanchable others not. Appears patient has stage 2 pressure injury to the back from lying on something. Patient states he was lying on some kind of plastic sheet for the last 2 days and that caused the injuries. Upon further chart  review found that EMS had applied Follicar portable transport unit under the patient for transport. From looking at photos of sheet design of straps beneath the sheet appears to match the markings left on patient's back. Assume sheet was left under patient after EMS transport, unsure exactly how long. Cleansed all affected areas. Patted dry. Painted with cavilon. Applied bordered foam dressings over intact and ruptured blisters. Recommend changing twice weekly and turning every 2 hours.     Orders placed. Plan to F/U next week.        07/10/25 1425   WOCN Assessment   WOCN Total Time (mins) 45   Visit Date 07/10/25   Visit Time 1425   Consult Type New   WOCN Speciality Wound   Wound pressure;surgical;moisture   Number of Wounds 3   Intervention assessed;changed;applied;chart review;orders   Teaching on-going        Wound 07/10/25 0130 Pressure Injury Back   Date First Assessed/Time First Assessed: 07/10/25 0130   Primary Wound Type: Pressure Injury  Location: Back  Is this injury device related?: (c) Yes   Wound Image     Pressure Injury Stage 2   Dressing Appearance Open to air   Drainage Amount Scant   Drainage Characteristics/Odor Serous   Appearance Pink;Red;Tan;Blistered   Tissue loss description Partial thickness   Periwound Area Redness;Moist   Care Cleansed with:;Sterile normal saline;Applied:;Skin Barrier   Dressing Applied;Foam   Periwound Care Skin barrier film applied   Dressing Change Due 07/14/25        Wound 07/10/25 0100 Intertrigo Abdomen   Date First Assessed/Time First Assessed: 07/10/25 0100   Present on Original Admission: Yes  Primary Wound Type: Intertrigo  Location: (c) Abdomen   Wound Image    Dressing Appearance Open to air   Drainage Amount None   Drainage Characteristics/Odor No odor   Appearance Intact;Pink;Red;Moist   Tissue loss description Not applicable   Periwound Area Intact;Moist   Care Cleansed with:;Soap and water;Applied:;Other (see comments)  (interdry)   Periwound Care Dry  periwound area maintained   Dressing Change Due 07/11/25        Wound 07/10/25 0758 Incision Left lateral Hip vertical   Date First Assessed/Time First Assessed: 07/10/25 0758   Present on Original Admission: No  Primary Wound Type: Incision  Side: Left  Orientation: lateral  Location: Hip  Incision Type: vertical  Closure Method: Sutures  Additional Comments: Staples,X...   Incision WDL WDL   Dressing Appearance Clean;Dry;Intact   Appearance Dressing in place, unable to visualize  (surgical dressing)   Dressing Other (comment)  (staples, xeroform, gauze, ABD, medipore tape)       07/10/2025         [1]   Social History  Socioeconomic History    Marital status:    Tobacco Use    Smoking status: Never    Smokeless tobacco: Former     Quit date: 06/2024   Substance and Sexual Activity    Alcohol use: Not Currently     Alcohol/week: 6.0 standard drinks of alcohol     Types: 6 Cans of beer per week     Comment: 6 cans of beer on Saturdays    Drug use: Never    Sexual activity: Yes     Partners: Female     Social Drivers of Health     Financial Resource Strain: Low Risk  (7/9/2025)    Overall Financial Resource Strain (CARDIA)     Difficulty of Paying Living Expenses: Not hard at all   Food Insecurity: No Food Insecurity (7/9/2025)    Hunger Vital Sign     Worried About Running Out of Food in the Last Year: Never true     Ran Out of Food in the Last Year: Never true   Transportation Needs: No Transportation Needs (7/9/2025)    PRAPARE - Transportation     Lack of Transportation (Medical): No     Lack of Transportation (Non-Medical): No   Physical Activity: Inactive (10/9/2024)    Exercise Vital Sign     Days of Exercise per Week: 0 days     Minutes of Exercise per Session: 0 min   Stress: No Stress Concern Present (7/9/2025)    Citizen of [place] [employer] Occup[employer] - Occupational Stress Questionnaire     Feeling of Stress : Not at all   Housing Stability: Low Risk  (7/9/2025)    Housing Stability Vital Sign      Unable to Pay for Housing in the Last Year: No     Homeless in the Last Year: No

## 2025-07-11 LAB
ABO + RH BLD: NORMAL
ABSOLUTE EOSINOPHIL (OHS): 0.02 K/UL
ABSOLUTE MONOCYTE (OHS): 1.81 K/UL (ref 0.3–1)
ABSOLUTE NEUTROPHIL COUNT (OHS): 11.38 K/UL (ref 1.8–7.7)
ALBUMIN SERPL BCP-MCNC: 2.8 G/DL (ref 3.5–5.2)
ALP SERPL-CCNC: 46 UNIT/L (ref 40–150)
ALT SERPL W/O P-5'-P-CCNC: 24 UNIT/L (ref 10–44)
ANION GAP (OHS): 10 MMOL/L (ref 8–16)
AST SERPL-CCNC: 40 UNIT/L (ref 11–45)
BASOPHILS # BLD AUTO: 0.03 K/UL
BASOPHILS NFR BLD AUTO: 0.2 %
BILIRUB SERPL-MCNC: 0.6 MG/DL (ref 0.1–1)
BLD PROD TYP BPU: NORMAL
BLOOD UNIT EXPIRATION DATE: NORMAL
BLOOD UNIT TYPE CODE: 6200
BUN SERPL-MCNC: 39 MG/DL (ref 6–20)
CALCIUM SERPL-MCNC: 8.1 MG/DL (ref 8.7–10.5)
CHLORIDE SERPL-SCNC: 102 MMOL/L (ref 95–110)
CO2 SERPL-SCNC: 21 MMOL/L (ref 23–29)
CREAT SERPL-MCNC: 2.7 MG/DL (ref 0.5–1.4)
CROSSMATCH INTERPRETATION: NORMAL
DISPENSE STATUS: NORMAL
ERYTHROCYTE [DISTWIDTH] IN BLOOD BY AUTOMATED COUNT: 13.9 % (ref 11.5–14.5)
GFR SERPLBLD CREATININE-BSD FMLA CKD-EPI: 26 ML/MIN/1.73/M2
GLUCOSE SERPL-MCNC: 286 MG/DL (ref 70–110)
HCT VFR BLD AUTO: 21.6 % (ref 40–54)
HGB BLD-MCNC: 6.8 GM/DL (ref 14–18)
IMM GRANULOCYTES # BLD AUTO: 0.07 K/UL (ref 0–0.04)
IMM GRANULOCYTES NFR BLD AUTO: 0.5 % (ref 0–0.5)
LYMPHOCYTES # BLD AUTO: 1.67 K/UL (ref 1–4.8)
MAGNESIUM SERPL-MCNC: 1.8 MG/DL (ref 1.6–2.6)
MCH RBC QN AUTO: 27.3 PG (ref 27–31)
MCHC RBC AUTO-ENTMCNC: 31.5 G/DL (ref 32–36)
MCV RBC AUTO: 87 FL (ref 82–98)
NUCLEATED RBC (/100WBC) (OHS): 0 /100 WBC
PLATELET # BLD AUTO: 177 K/UL (ref 150–450)
PMV BLD AUTO: 10.6 FL (ref 9.2–12.9)
POCT GLUCOSE: 232 MG/DL (ref 70–110)
POCT GLUCOSE: 270 MG/DL (ref 70–110)
POCT GLUCOSE: 311 MG/DL (ref 70–110)
POCT GLUCOSE: 317 MG/DL (ref 70–110)
POTASSIUM SERPL-SCNC: 4.2 MMOL/L (ref 3.5–5.1)
PROT SERPL-MCNC: 5.7 GM/DL (ref 6–8.4)
RBC # BLD AUTO: 2.49 M/UL (ref 4.6–6.2)
RELATIVE EOSINOPHIL (OHS): 0.1 %
RELATIVE LYMPHOCYTE (OHS): 11.1 % (ref 18–48)
RELATIVE MONOCYTE (OHS): 12.1 % (ref 4–15)
RELATIVE NEUTROPHIL (OHS): 76 % (ref 38–73)
SODIUM SERPL-SCNC: 133 MMOL/L (ref 136–145)
UNIT NUMBER: NORMAL
WBC # BLD AUTO: 14.98 K/UL (ref 3.9–12.7)

## 2025-07-11 PROCEDURE — 21400001 HC TELEMETRY ROOM

## 2025-07-11 PROCEDURE — 36430 TRANSFUSION BLD/BLD COMPNT: CPT

## 2025-07-11 PROCEDURE — 25000003 PHARM REV CODE 250: Performed by: NURSE PRACTITIONER

## 2025-07-11 PROCEDURE — 25000003 PHARM REV CODE 250: Performed by: ORTHOPAEDIC SURGERY

## 2025-07-11 PROCEDURE — P9016 RBC LEUKOCYTES REDUCED: HCPCS | Performed by: NURSE PRACTITIONER

## 2025-07-11 PROCEDURE — 63600175 PHARM REV CODE 636 W HCPCS: Performed by: FAMILY MEDICINE

## 2025-07-11 PROCEDURE — 99024 POSTOP FOLLOW-UP VISIT: CPT | Mod: ,,, | Performed by: ORTHOPAEDIC SURGERY

## 2025-07-11 PROCEDURE — 36415 COLL VENOUS BLD VENIPUNCTURE: CPT | Performed by: NURSE PRACTITIONER

## 2025-07-11 PROCEDURE — 30233N1 TRANSFUSION OF NONAUTOLOGOUS RED BLOOD CELLS INTO PERIPHERAL VEIN, PERCUTANEOUS APPROACH: ICD-10-PCS | Performed by: STUDENT IN AN ORGANIZED HEALTH CARE EDUCATION/TRAINING PROGRAM

## 2025-07-11 PROCEDURE — 94761 N-INVAS EAR/PLS OXIMETRY MLT: CPT

## 2025-07-11 PROCEDURE — 63600175 PHARM REV CODE 636 W HCPCS: Performed by: STUDENT IN AN ORGANIZED HEALTH CARE EDUCATION/TRAINING PROGRAM

## 2025-07-11 PROCEDURE — 84075 ASSAY ALKALINE PHOSPHATASE: CPT | Performed by: NURSE PRACTITIONER

## 2025-07-11 PROCEDURE — 83735 ASSAY OF MAGNESIUM: CPT | Performed by: NURSE PRACTITIONER

## 2025-07-11 PROCEDURE — 63600175 PHARM REV CODE 636 W HCPCS: Performed by: NURSE PRACTITIONER

## 2025-07-11 PROCEDURE — 85025 COMPLETE CBC W/AUTO DIFF WBC: CPT | Performed by: NURSE PRACTITIONER

## 2025-07-11 RX ORDER — SODIUM CHLORIDE, SODIUM LACTATE, POTASSIUM CHLORIDE, CALCIUM CHLORIDE 600; 310; 30; 20 MG/100ML; MG/100ML; MG/100ML; MG/100ML
INJECTION, SOLUTION INTRAVENOUS CONTINUOUS
Status: DISCONTINUED | OUTPATIENT
Start: 2025-07-11 | End: 2025-07-12

## 2025-07-11 RX ORDER — HYDROCODONE BITARTRATE AND ACETAMINOPHEN 500; 5 MG/1; MG/1
TABLET ORAL
Status: DISCONTINUED | OUTPATIENT
Start: 2025-07-11 | End: 2025-07-16 | Stop reason: HOSPADM

## 2025-07-11 RX ADMIN — FLECAINIDE ACETATE 50 MG: 50 TABLET ORAL at 08:07

## 2025-07-11 RX ADMIN — CARVEDILOL 6.25 MG: 6.25 TABLET, FILM COATED ORAL at 04:07

## 2025-07-11 RX ADMIN — INSULIN ASPART 2 UNITS: 100 INJECTION, SOLUTION INTRAVENOUS; SUBCUTANEOUS at 08:07

## 2025-07-11 RX ADMIN — MORPHINE SULFATE 4 MG: 4 INJECTION INTRAVENOUS at 06:07

## 2025-07-11 RX ADMIN — INSULIN ASPART 8 UNITS: 100 INJECTION, SOLUTION INTRAVENOUS; SUBCUTANEOUS at 11:07

## 2025-07-11 RX ADMIN — CHLORHEXIDINE GLUCONATE 0.12% ORAL RINSE 10 ML: 1.2 LIQUID ORAL at 08:07

## 2025-07-11 RX ADMIN — INSULIN GLARGINE 13 UNITS: 100 INJECTION, SOLUTION SUBCUTANEOUS at 08:07

## 2025-07-11 RX ADMIN — INSULIN ASPART 6 UNITS: 100 INJECTION, SOLUTION INTRAVENOUS; SUBCUTANEOUS at 05:07

## 2025-07-11 RX ADMIN — ATORVASTATIN CALCIUM 20 MG: 10 TABLET, FILM COATED ORAL at 08:07

## 2025-07-11 RX ADMIN — SODIUM CHLORIDE, POTASSIUM CHLORIDE, SODIUM LACTATE AND CALCIUM CHLORIDE: 600; 310; 30; 20 INJECTION, SOLUTION INTRAVENOUS at 12:07

## 2025-07-11 RX ADMIN — INSULIN ASPART 8 UNITS: 100 INJECTION, SOLUTION INTRAVENOUS; SUBCUTANEOUS at 04:07

## 2025-07-11 NOTE — PLAN OF CARE
POC reviewed with pt. Pt verbalizes understanding of POC. No questions at this time.  AAOx4. NADN.  Room air.  NSR/ST on cardiac monitor.  Pt remains free of falls. Patient wearing non-skid footwear.  No complaints at this time.  Safety measures in place. Will continue to monitor.  Informed pt to call for assistance before getting up. Pt verbalizes understanding.  Hourly rounding and chart check complete.   Bed in lowest position, wheels locked, side rails up x2, call light in reach.  1 unit RBC's administered per MD order, tolerated well.

## 2025-07-11 NOTE — PT/OT/SLP PROGRESS
Physical Therapy      Patient Name:  Roland Lopez   MRN:  [id number]    Chart review performed and attempted but patient not seen today secondary to Blood transfusion. ([id number])

## 2025-07-11 NOTE — PLAN OF CARE
POC reviewed with pt. Pt verbalizes understanding of POC. No questions at this time.  AAOx4. NADN.  NSR on cardiac monitor.  Pt remains free of falls.  No complaints at this time.  Safety measures in place. Will continue to monitor.  Informed pt to call for assistance before getting up. Pt verbalizes understanding.  Hourly rounding and chart check complete.   Bed in lowest position, wheels locked, side rails up x2, call light in reach.

## 2025-07-11 NOTE — PT/OT/SLP PROGRESS
Occupational Therapy      Patient Name:  Roland Lopez   MRN:  [id number]    OT eval initiated via chart review and attempted. Patient not seen today secondary to Blood transfusion, Nursing care. Will follow-up as able.    7/11/2025  Kristal Marley, OT   1205    Occupational Profile:  Living Environment: lives with father and fiance in a 1st floor apartment with threshold to enter. Tub/shower combo.  Previous level of function: Pt (I) with ADLs and functional mobility.  Roles and Routines: drives and works at a car [employer]  Equipment Used at Home:  grab bar, wall railings throughout home  Assistance upon Discharge: unknown   Speech Therapy    Visit Type: Treatment  Born at Gestational Age: 30w4d and now corrected age 32w 5d    Nursing comments: RN consented to this evaluation.  Present at bedside: RN  Subjective reported by: RN  Precautions: per guidelines    SUBJECTIVE  RN in agreement to work with patient for therapy session.  HOSPITAL PROBLEMS:  Principal Problem:    Duodenal atresia  Active Problems:    Respiratory distress    Premature infant of 30 weeks gestation    SGA (small for gestational age)    Olean affected by IUGR  Resolved Problems:    * No resolved hospital problems. *    No parent/caregiver bedside. SLP completed assessment in conjunction with RN care  Patient / Family Goals: did not state    Pain   RN completed pain assessment during care time    OBJECTIVE    Diet:  Non-oral:  IV and TPN  3/1:  No oral feedings with OG to LIS  Environment and Equipment:   - Bed type: isolette   - Sound: decrease auditory stimuli and frequent alarms  high flow: 1L flow, 21 %FiO2  via: nasal     Infant Regulation   - Symptoms at onset of session:     • emesis and trouble settling into sleep     • neurobehavioral:       - autonomic stability: oxygen saturations within parameters, stable heart rate and regular respiration       - state stability: active alert and quiet alert       - state instability: periods of crying       - motor stability: hands to mouth       - motor instability: facial grimace and extension pattern   - Sensory integration interventions: two person cares, containment, decreased environmental stimuli, flexed midline positioning, swaddle, continuous touch, neurodevelopmental positioning, resting hands, neurodevelopmental handling and modify environmental light   - Response to interventions: achieved light sleep state by end of session and re-organization of state     Infant Feeding  Infant received in a deep sleep state during cares. Patient tolerated gentle, slow movements and was able to reach an awake state. SLP  provided breast milk swabs; with increased time, Patient exhibited increased oral-motor movements and lingual protrusion with eventual root to accept wee-thumbed pacifier and purple preemie pacifier. Trace tastes provided with inconsistent sucking bursts of 2-3 sucks. No distress nor clinical s/s aspiration                ASSESSMENT                                                                          • Skilled therapy is required to facilitate transition to a safe oral feeding plan.  • Infant presented with tolerance of cares, breastmilk swabs with oral-motor skills/ non-nutritive sucking skills consistent with PMA. The infant will continue to benefit from ongoing skilled speech therapy services to provide neuroprotective cares, early cognitive stimulation, ongoing assessment of pre-feeding development, safe oral progression, and ongoing parent/caregiver education to support generalization of targeted goals for safe discharge to home.     Requires SLP Follow Up: Yes    Discharge Recommendations  SLP Referrals/Discharge Recommendations: Refer to NICU follow up clinic, Refer to early intervention    PLAN  Requires SLP Follow Up: Yes    Discharge Recommendations  SLP Referrals/Discharge Recommendations: Refer to NICU follow up clinic, Refer to early intervention   Interventions:  Dysphagia therapy    Plan/Goal Agreement: will attempt to meet with parents at bedside    RECOMMENDATIONS  Diet:    - Encourage caregiver participation in cares  - offer infant's hand-to-mouth and/or pacifier when she is suckling  - offer wee thumbie pacifier/ purple pacifier   Factors impacting feeding: comorbidities and prematurity    GOALS    Short Term Goals:   1.  Infant will safely accept trials of thin liquids without distress and/or overt clinical s/s of aspiration across 3/3 feedings.  2.  Infant will participate in ongoing assessment to determine if further objective assessment via VFSS is warranted.   3. Infant's  parents/caregivers will participate in at least x1-2 education/training sessions to support generalization of feeding/swallowing recommendations in preparation for discharge to home.     Long Term Goals: (to be met by time of discharge from hospital)  1. Infant will safely accept the least restrictive diet orally without exhibiting overt clinical s/s of aspiration.  2. Parent/caregiver will implement the recommended supports/strategies to support safe and pleasurable oral feedings in the home environment.     Documented in the chart in the following areas:    Assessment.    Patient at End of Session:   Handoff to: nurse      Therapy procedure time and total treatment time can be found documented on the Time Entry flowsheet.

## 2025-07-11 NOTE — PROGRESS NOTES
Postop day 1    Hemoglobin 6.8    Feeling much better today    He is able to dorsiflex his toes today which he could not do last evening again going along with palsy from the excessive traction during the surgery    Begin mobilization today touchdown weight-bearing left lower extremity    We will likely require transfusion

## 2025-07-12 LAB
ABO + RH BLD: NORMAL
ABSOLUTE EOSINOPHIL (OHS): 0.1 K/UL
ABSOLUTE MONOCYTE (OHS): 1.13 K/UL (ref 0.3–1)
ABSOLUTE NEUTROPHIL COUNT (OHS): 7.15 K/UL (ref 1.8–7.7)
ALBUMIN SERPL BCP-MCNC: 2.4 G/DL (ref 3.5–5.2)
ALP SERPL-CCNC: 50 UNIT/L (ref 40–150)
ALT SERPL W/O P-5'-P-CCNC: 22 UNIT/L (ref 10–44)
ANION GAP (OHS): 7 MMOL/L (ref 8–16)
AST SERPL-CCNC: 41 UNIT/L (ref 11–45)
BASOPHILS # BLD AUTO: 0.03 K/UL
BASOPHILS NFR BLD AUTO: 0.3 %
BILIRUB SERPL-MCNC: 1 MG/DL (ref 0.1–1)
BLD PROD TYP BPU: NORMAL
BLOOD UNIT EXPIRATION DATE: NORMAL
BLOOD UNIT TYPE CODE: 6200
BUN SERPL-MCNC: 47 MG/DL (ref 6–20)
CALCIUM SERPL-MCNC: 8 MG/DL (ref 8.7–10.5)
CHLORIDE SERPL-SCNC: 102 MMOL/L (ref 95–110)
CO2 SERPL-SCNC: 24 MMOL/L (ref 23–29)
CREAT SERPL-MCNC: 1.4 MG/DL (ref 0.5–1.4)
CROSSMATCH INTERPRETATION: NORMAL
DISPENSE STATUS: NORMAL
ERYTHROCYTE [DISTWIDTH] IN BLOOD BY AUTOMATED COUNT: 14.3 % (ref 11.5–14.5)
GFR SERPLBLD CREATININE-BSD FMLA CKD-EPI: 58 ML/MIN/1.73/M2
GLUCOSE SERPL-MCNC: 279 MG/DL (ref 70–110)
HCT VFR BLD AUTO: 20.3 % (ref 40–54)
HGB BLD-MCNC: 6.6 GM/DL (ref 14–18)
IMM GRANULOCYTES # BLD AUTO: 0.04 K/UL (ref 0–0.04)
IMM GRANULOCYTES NFR BLD AUTO: 0.4 % (ref 0–0.5)
INDIRECT COOMBS: NORMAL
LYMPHOCYTES # BLD AUTO: 1.24 K/UL (ref 1–4.8)
MAGNESIUM SERPL-MCNC: 2 MG/DL (ref 1.6–2.6)
MCH RBC QN AUTO: 28 PG (ref 27–31)
MCHC RBC AUTO-ENTMCNC: 32.5 G/DL (ref 32–36)
MCV RBC AUTO: 86 FL (ref 82–98)
NUCLEATED RBC (/100WBC) (OHS): 0 /100 WBC
PHOSPHATE SERPL-MCNC: 2.7 MG/DL (ref 2.7–4.5)
PLATELET # BLD AUTO: 158 K/UL (ref 150–450)
PMV BLD AUTO: 10.2 FL (ref 9.2–12.9)
POCT GLUCOSE: 271 MG/DL (ref 70–110)
POCT GLUCOSE: 287 MG/DL (ref 70–110)
POCT GLUCOSE: 287 MG/DL (ref 70–110)
POCT GLUCOSE: 334 MG/DL (ref 70–110)
POTASSIUM SERPL-SCNC: 4 MMOL/L (ref 3.5–5.1)
PROT SERPL-MCNC: 5.6 GM/DL (ref 6–8.4)
RBC # BLD AUTO: 2.36 M/UL (ref 4.6–6.2)
RELATIVE EOSINOPHIL (OHS): 1 %
RELATIVE LYMPHOCYTE (OHS): 12.8 % (ref 18–48)
RELATIVE MONOCYTE (OHS): 11.7 % (ref 4–15)
RELATIVE NEUTROPHIL (OHS): 73.8 % (ref 38–73)
RH BLD: NORMAL
SODIUM SERPL-SCNC: 133 MMOL/L (ref 136–145)
SPECIMEN OUTDATE: NORMAL
UNIT NUMBER: NORMAL
WBC # BLD AUTO: 9.69 K/UL (ref 3.9–12.7)

## 2025-07-12 PROCEDURE — 63600175 PHARM REV CODE 636 W HCPCS: Performed by: STUDENT IN AN ORGANIZED HEALTH CARE EDUCATION/TRAINING PROGRAM

## 2025-07-12 PROCEDURE — 83735 ASSAY OF MAGNESIUM: CPT | Performed by: STUDENT IN AN ORGANIZED HEALTH CARE EDUCATION/TRAINING PROGRAM

## 2025-07-12 PROCEDURE — P9016 RBC LEUKOCYTES REDUCED: HCPCS | Performed by: STUDENT IN AN ORGANIZED HEALTH CARE EDUCATION/TRAINING PROGRAM

## 2025-07-12 PROCEDURE — 25000003 PHARM REV CODE 250: Performed by: ORTHOPAEDIC SURGERY

## 2025-07-12 PROCEDURE — 84100 ASSAY OF PHOSPHORUS: CPT | Performed by: STUDENT IN AN ORGANIZED HEALTH CARE EDUCATION/TRAINING PROGRAM

## 2025-07-12 PROCEDURE — 27000221 HC OXYGEN, UP TO 24 HOURS

## 2025-07-12 PROCEDURE — 97530 THERAPEUTIC ACTIVITIES: CPT

## 2025-07-12 PROCEDURE — 94761 N-INVAS EAR/PLS OXIMETRY MLT: CPT

## 2025-07-12 PROCEDURE — 97166 OT EVAL MOD COMPLEX 45 MIN: CPT

## 2025-07-12 PROCEDURE — 99024 POSTOP FOLLOW-UP VISIT: CPT | Mod: ,,, | Performed by: ORTHOPAEDIC SURGERY

## 2025-07-12 PROCEDURE — 97110 THERAPEUTIC EXERCISES: CPT

## 2025-07-12 PROCEDURE — 36430 TRANSFUSION BLD/BLD COMPNT: CPT

## 2025-07-12 PROCEDURE — 86920 COMPATIBILITY TEST SPIN: CPT | Performed by: STUDENT IN AN ORGANIZED HEALTH CARE EDUCATION/TRAINING PROGRAM

## 2025-07-12 PROCEDURE — 80053 COMPREHEN METABOLIC PANEL: CPT | Performed by: STUDENT IN AN ORGANIZED HEALTH CARE EDUCATION/TRAINING PROGRAM

## 2025-07-12 PROCEDURE — 85025 COMPLETE CBC W/AUTO DIFF WBC: CPT | Performed by: STUDENT IN AN ORGANIZED HEALTH CARE EDUCATION/TRAINING PROGRAM

## 2025-07-12 PROCEDURE — 63600175 PHARM REV CODE 636 W HCPCS: Performed by: FAMILY MEDICINE

## 2025-07-12 PROCEDURE — 94799 UNLISTED PULMONARY SVC/PX: CPT

## 2025-07-12 PROCEDURE — 99900035 HC TECH TIME PER 15 MIN (STAT)

## 2025-07-12 PROCEDURE — 94660 CPAP INITIATION&MGMT: CPT

## 2025-07-12 PROCEDURE — 25000003 PHARM REV CODE 250: Performed by: NURSE PRACTITIONER

## 2025-07-12 PROCEDURE — 21400001 HC TELEMETRY ROOM

## 2025-07-12 PROCEDURE — 36415 COLL VENOUS BLD VENIPUNCTURE: CPT | Performed by: STUDENT IN AN ORGANIZED HEALTH CARE EDUCATION/TRAINING PROGRAM

## 2025-07-12 PROCEDURE — 86850 RBC ANTIBODY SCREEN: CPT | Performed by: STUDENT IN AN ORGANIZED HEALTH CARE EDUCATION/TRAINING PROGRAM

## 2025-07-12 RX ORDER — HYDROCODONE BITARTRATE AND ACETAMINOPHEN 500; 5 MG/1; MG/1
TABLET ORAL
Status: DISCONTINUED | OUTPATIENT
Start: 2025-07-12 | End: 2025-07-16 | Stop reason: HOSPADM

## 2025-07-12 RX ADMIN — FLECAINIDE ACETATE 50 MG: 50 TABLET ORAL at 08:07

## 2025-07-12 RX ADMIN — INSULIN ASPART 6 UNITS: 100 INJECTION, SOLUTION INTRAVENOUS; SUBCUTANEOUS at 04:07

## 2025-07-12 RX ADMIN — CARVEDILOL 6.25 MG: 6.25 TABLET, FILM COATED ORAL at 09:07

## 2025-07-12 RX ADMIN — CARVEDILOL 6.25 MG: 6.25 TABLET, FILM COATED ORAL at 05:07

## 2025-07-12 RX ADMIN — ATORVASTATIN CALCIUM 20 MG: 10 TABLET, FILM COATED ORAL at 09:07

## 2025-07-12 RX ADMIN — INSULIN ASPART 6 UNITS: 100 INJECTION, SOLUTION INTRAVENOUS; SUBCUTANEOUS at 05:07

## 2025-07-12 RX ADMIN — SODIUM CHLORIDE, POTASSIUM CHLORIDE, SODIUM LACTATE AND CALCIUM CHLORIDE: 600; 310; 30; 20 INJECTION, SOLUTION INTRAVENOUS at 07:07

## 2025-07-12 RX ADMIN — HYDROCODONE BITARTRATE AND ACETAMINOPHEN 1 TABLET: 5; 325 TABLET ORAL at 08:07

## 2025-07-12 RX ADMIN — INSULIN ASPART 3 UNITS: 100 INJECTION, SOLUTION INTRAVENOUS; SUBCUTANEOUS at 08:07

## 2025-07-12 RX ADMIN — CHLORHEXIDINE GLUCONATE 0.12% ORAL RINSE 10 ML: 1.2 LIQUID ORAL at 08:07

## 2025-07-12 RX ADMIN — FLECAINIDE ACETATE 50 MG: 50 TABLET ORAL at 09:07

## 2025-07-12 RX ADMIN — INSULIN GLARGINE 13 UNITS: 100 INJECTION, SOLUTION SUBCUTANEOUS at 08:07

## 2025-07-12 RX ADMIN — CHLORHEXIDINE GLUCONATE 0.12% ORAL RINSE 10 ML: 1.2 LIQUID ORAL at 09:07

## 2025-07-12 RX ADMIN — INSULIN ASPART 8 UNITS: 100 INJECTION, SOLUTION INTRAVENOUS; SUBCUTANEOUS at 11:07

## 2025-07-12 NOTE — PT/OT/SLP PROGRESS
Physical Therapy      Patient Name:  Roland Lopez   MRN:  [id number]    Chart review performed and attempted but patient not seen today secondary to Blood transfusion due to decreasing H/H. ([id number])

## 2025-07-12 NOTE — PROGRESS NOTES
Postop day 2    Hemoglobin 6.6    Neurovascular exam of the left lower extremity is intact this morning    Dressings have significant drainage we will start daily changing    He is sitting up at the edge of bed and feeling much better    He will need to transition to p.o. pain pills and passed physical therapy before discharge to home

## 2025-07-12 NOTE — ASSESSMENT & PLAN NOTE
Patient's blood pressure range in the last 24 hours was: BP  Min: 96/49  Max: 129/70.The patient's inpatient anti-hypertensive regimen is listed below:  Current Antihypertensives  carvediloL tablet 6.25 mg, 2 times daily with meals, Oral  hydrALAZINE injection 10 mg, Every 6 hours PRN, Intravenous    Plan  - BP is controlled, no changes needed to their regimen

## 2025-07-12 NOTE — NURSING
Pt noted a few hours into shift with his CPAP off, more lethargic and disoriented to time and place. Pt stated he was frustrated he didn't feel himself. Assessed pt o2 sat and was 85-90%. Re-applied CPAP and later nasal canula 2L w/ good effect, pulse ox now over 95% and pt feeling much improvement and oriented. Notified Md Ríos of situation and continue to monitor mental status and spo2.

## 2025-07-12 NOTE — ASSESSMENT & PLAN NOTE
Patient has paroxysmal (<7 days) atrial fibrillation. Patient is currently in sinus rhythm. [id number] Score: 1. The patients heart rate in the last 24 hours is as follows:  Pulse  Min: 91  Max: 115     Antiarrhythmics  flecainide tablet 50 mg, Every 12 hours, Oral    Anticoagulants       Plan  - Replete lytes with a goal of K>4, Mg >2  - Patient is not anticoagulated due to Eliquis on hold for surgery  - Patient's afib is currently controlled  Continue holding a/c due to drop in hb/hct postoperatively

## 2025-07-12 NOTE — SUBJECTIVE & OBJECTIVE
Interval History: No acute events overnight, afebrile, hemodynamically stable.  Reports improvement in numbness symptoms that were present postprocedurally.  Transfused 1 u pRbc for post-op anemia with Hgb<7. Renal function concerning for TRACY, started on fluid resuscitation.       Objective:     Vital Signs (Most Recent):  Temp: 98.5 °F (36.9 °C) (07/11/25 1907)  Pulse: 98 (07/11/25 1907)  Resp: 18 (07/11/25 1907)  BP: (!) 115/58 (07/11/25 1907)  SpO2: 95 % (07/11/25 1907) Vital Signs (24h Range):  Temp:  [97.7 °F (36.5 °C)-98.6 °F (37 °C)] 98.5 °F (36.9 °C)  Pulse:  [] 98  Resp:  [16-19] 18  SpO2:  [92 %-100 %] 95 %  BP: ()/(49-70) 115/58     Weight: (!) 150.7 kg (332 lb 3.7 oz)  Body mass index is 42.66 kg/m².    Intake/Output Summary (Last 24 hours) at 7/11/2025 1921  Last data filed at 7/11/2025 1706  Gross per 24 hour   Intake 1518.05 ml   Output 675 ml   Net 843.05 ml         Physical Exam  Vitals and nursing note reviewed.   Constitutional:       General: He is not in acute distress.     Appearance: He is morbidly obese. He is ill-appearing. He is not toxic-appearing.   HENT:      Head: Normocephalic and atraumatic.   Eyes:      General: No scleral icterus.        Right eye: No discharge.         Left eye: No discharge.   Cardiovascular:      Rate and Rhythm: Normal rate.   Pulmonary:      Effort: Pulmonary effort is normal. No respiratory distress.   Abdominal:      Palpations: Abdomen is soft.      Tenderness: There is no abdominal tenderness.   Musculoskeletal:        Legs:    Skin:     General: Skin is warm.      Coloration: Skin is pale. Skin is not jaundiced.   Neurological:      Mental Status: He is alert and easily aroused.      Motor: Weakness present.             Significant Labs: All pertinent labs within the past 24 hours have been reviewed.   Recent Labs   Lab 07/09/25  0505 07/10/25  0513 07/11/25  0512    137 133*   K 3.9 4.0 4.2    105 102   CO2 26 26 21*   BUN 15 14  39*   CREATININE 0.7 0.7 2.7*   * 213* 286*   ANIONGAP 4* 6* 10     Recent Labs   Lab 07/09/25  0505 07/10/25  0513 07/11/25  0512   MG 1.7 1.7 1.8     Recent Labs   Lab 07/09/25  0505 07/10/25  0513 07/11/25  0512   AST 19 17 40   ALT 23 19 24   ALKPHOS 60 58 46   BILITOT 1.0 1.2* 0.6   ALBUMIN 3.0* 3.1* 2.8*     POCT Glucose:   Recent Labs   Lab 07/11/25  0550 07/11/25  1140 07/11/25  1634   POCTGLUCOSE 270* 317* 311*    Recent Labs   Lab 07/09/25  0505 07/10/25  0512 07/10/25  1128 07/11/25  0512   WBC 7.80 8.45  --  14.98*   HGB 11.3* 10.9* 8.5* 6.8*   HCT 34.8* 33.6* 26.0* 21.6*    145*  --  177                         Significant Imaging:  I have reviewed all pertinent imaging results/findings within the past 24 hours.   X-Ray Femur 2 View Left   Final Result      Satisfactory postoperative study.         Electronically signed by: Darion Ricci MD   Date:    07/10/2025   Time:    11:50      SURG FL Surgery Fluoro Usage   Final Result      X-Ray Femur 2 View Left   Final Result      See above.         Electronically signed by: Kadeem Taylor MD   Date:    07/10/2025   Time:    10:08      X-Ray Femur Ap/Lat Left   Final Result      There is an acute appearing fracture in the trochanteric portion of the left femur.         Electronically signed by: Zack King MD   Date:    07/08/2025   Time:    08:42      X-Ray Pelvis Routine AP   Final Result      There is an acute appearing fracture in the intertrochanteric portion of the left femur.         Electronically signed by: Zack King MD   Date:    07/08/2025   Time:    08:41      X-Ray Chest AP Portable   Final Result      1. There is no focal pulmonary infiltrate visualized.   2. There is moderate cardiomegaly.   .         Electronically signed by: Zack King MD   Date:    07/08/2025   Time:    08:39      CT Head Without Contrast   Final Result      1. There is no calvarial fracture or intracranial hemorrhage.   2. Right maxillary  sinusitis   3. There is a 13 mm polyp versus mucous retention cyst in the left maxillary sinus.   All CT scans at this facility use dose modulation, iterative reconstruction, and/or weight base dosing when appropriate to reduce radiation dose when appropriate to reduce radiation dose to as low as reasonably achievable.         Electronically signed by: Zack King MD   Date:    07/08/2025   Time:    08:14          Inpatient Medications:  Continuous Infusions:   lactated ringers   Intravenous Continuous 75 mL/hr at 07/11/25 1706 Rate Verify at 07/11/25 1706       Scheduled Meds:   atorvastatin  20 mg Oral Daily    carvediloL  6.25 mg Oral BID WM    chlorhexidine  10 mL Mouth/Throat BID    flecainide  50 mg Oral Q12H    insulin glargine U-100  13 Units Subcutaneous QHS       PRN Meds:  Current Facility-Administered Medications:     0.9%  NaCl infusion (for blood administration), , Intravenous, Q24H PRN    0.9%  NaCl infusion (for blood administration), , Intravenous, Q24H PRN    acetaminophen, 650 mg, Oral, Q4H PRN    aluminum-magnesium hydroxide-simethicone, 30 mL, Oral, QID PRN    dextrose 50%, 12.5 g, Intravenous, PRN    dextrose 50%, 25 g, Intravenous, PRN    glucagon (human recombinant), 1 mg, Intramuscular, PRN    glucose, 16 g, Oral, PRN    glucose, 24 g, Oral, PRN    hydrALAZINE, 10 mg, Intravenous, Q6H PRN    HYDROcodone-acetaminophen, 1 tablet, Oral, Q4H PRN    insulin aspart U-100, 0-10 Units, Subcutaneous, QID (AC + HS) PRN    melatonin, 6 mg, Oral, Nightly PRN    morphine, 4 mg, Intravenous, Q4H PRN    naloxone, 0.02 mg, Intravenous, PRN    ondansetron, 4 mg, Intravenous, Q12H PRN    polyethylene glycol, 17 g, Oral, BID PRN    prochlorperazine, 5 mg, Intravenous, Q6H PRN    promethazine, 25 mg, Rectal, Q6H PRN    sodium chloride 0.9%, 10 mL, Intravenous, Q12H PRN

## 2025-07-12 NOTE — PROGRESS NOTES
[facility] Medicine  Progress Note    Patient Name: Roland Lopez  MRN: [id number]  Patient Class: IP- Inpatient   Admission Date: 7/8/2025  Length of Stay: 3 days  Attending Physician: Jon Portillo DO  Primary Care Provider: Emilee, Primary Doctor        Subjective     Principal Problem:Closed nondisplaced subtrochanteric fracture of left femur        HPI:  60 y.o. male with a PMHx of HTN, EVELYN, DM, HLD, and paroxysmal A fib who presented to the Emergency Department for evaluation of a fall which occurred this morning. Pt state he was getting ready for work when he slipped on what he thinks was cat urine in his home. Pt tried to use the installed railings in his home but could not catch himself. Symptoms are constant and moderate in severity. No mitigating or exacerbating factors reported. Associated sxs include left leg pain and left hip pain. Patient denies any nausea or LOC, numbness or tingling. No prior Tx specified. Pt reports he takes eliquis. No further complaints or concerns at this time.   ED course: H/H: 13.3/40.4, Plt 157K, glucose 280, PTT: Wnl, PT/INR:WNL. Given 4mg Morphine for pain in ED.     Left Femur xray, Pelvic xray: There is an acute appearing fracture in the trochanteric portion of the left femur.   CXR: no acute findings, CT head: There is no calvarial fracture or intracranial hemorrhage, other findings insignificant  Consulted Orthopedic Surgery, recommended admission for surgery later today.  I note some minor lab abnormalities that have been stable over time, these are of doubtful clinical significance.       Patient is a full code.   After evaluating the patients clinical presentation, vitals, labs, and risk factors, the attending physician and I decided to admit the patient for monitoring, diagnostics, and treatment due to the risk of deterioration if managed outpatient   Admitted to  for management of Left Femur Fracture.     Overview/Hospital  Course:  7/9 admitted for acute left hip fracture status post slip and fall. Ortho consulted and recommended surgical intervention. Due to recent use of ozempic, surgery for today after being npo x 24h+h. Complains of pain. Denies any problems with anesthesia in the past.   7/10 status post intramedullary maria t insertion for femur fracture. Tolerated well. Physical/occupational therapy when able.  7/11 Transfused 1 u pRbc for post-op anemia with Hgb<7. Renal function concerning for TRACY, started on fluid resuscitation.    Interval History: No acute events overnight, afebrile, hemodynamically stable.  Reports improvement in numbness symptoms that were present postprocedurally.  Transfused 1 u pRbc for post-op anemia with Hgb<7. Renal function concerning for TRACY, started on fluid resuscitation.       Objective:     Vital Signs (Most Recent):  Temp: 98.5 °F (36.9 °C) (07/11/25 1907)  Pulse: 98 (07/11/25 1907)  Resp: 18 (07/11/25 1907)  BP: (!) 115/58 (07/11/25 1907)  SpO2: 95 % (07/11/25 1907) Vital Signs (24h Range):  Temp:  [97.7 °F (36.5 °C)-98.6 °F (37 °C)] 98.5 °F (36.9 °C)  Pulse:  [] 98  Resp:  [16-19] 18  SpO2:  [92 %-100 %] 95 %  BP: ()/(49-70) 115/58     Weight: (!) 150.7 kg (332 lb 3.7 oz)  Body mass index is 42.66 kg/m².    Intake/Output Summary (Last 24 hours) at 7/11/2025 1921  Last data filed at 7/11/2025 1706  Gross per 24 hour   Intake 1518.05 ml   Output 675 ml   Net 843.05 ml         Physical Exam  Vitals and nursing note reviewed.   Constitutional:       General: He is not in acute distress.     Appearance: He is morbidly obese. He is ill-appearing. He is not toxic-appearing.   HENT:      Head: Normocephalic and atraumatic.   Eyes:      General: No scleral icterus.        Right eye: No discharge.         Left eye: No discharge.   Cardiovascular:      Rate and Rhythm: Normal rate.   Pulmonary:      Effort: Pulmonary effort is normal. No respiratory distress.   Abdominal:      Palpations:  Abdomen is soft.      Tenderness: There is no abdominal tenderness.   Musculoskeletal:        Legs:    Skin:     General: Skin is warm.      Coloration: Skin is pale. Skin is not jaundiced.   Neurological:      Mental Status: He is alert and easily aroused.      Motor: Weakness present.             Significant Labs: All pertinent labs within the past 24 hours have been reviewed.   Recent Labs   Lab 07/09/25  0505 07/10/25  0513 07/11/25  0512    137 133*   K 3.9 4.0 4.2    105 102   CO2 26 26 21*   BUN 15 14 39*   CREATININE 0.7 0.7 2.7*   * 213* 286*   ANIONGAP 4* 6* 10     Recent Labs   Lab 07/09/25  0505 07/10/25  0513 07/11/25  0512   MG 1.7 1.7 1.8     Recent Labs   Lab 07/09/25  0505 07/10/25  0513 07/11/25  0512   AST 19 17 40   ALT 23 19 24   ALKPHOS 60 58 46   BILITOT 1.0 1.2* 0.6   ALBUMIN 3.0* 3.1* 2.8*     POCT Glucose:   Recent Labs   Lab 07/11/25  0550 07/11/25  1140 07/11/25  1634   POCTGLUCOSE 270* 317* 311*    Recent Labs   Lab 07/09/25  0505 07/10/25  0512 07/10/25  1128 07/11/25  0512   WBC 7.80 8.45  --  14.98*   HGB 11.3* 10.9* 8.5* 6.8*   HCT 34.8* 33.6* 26.0* 21.6*    145*  --  177                         Significant Imaging:  I have reviewed all pertinent imaging results/findings within the past 24 hours.   X-Ray Femur 2 View Left   Final Result      Satisfactory postoperative study.         Electronically signed by: Darion Ricci MD   Date:    07/10/2025   Time:    11:50      SURG FL Surgery Fluoro Usage   Final Result      X-Ray Femur 2 View Left   Final Result      See above.         Electronically signed by: Kadeem Taylor MD   Date:    07/10/2025   Time:    10:08      X-Ray Femur Ap/Lat Left   Final Result      There is an acute appearing fracture in the trochanteric portion of the left femur.         Electronically signed by: Zack King MD   Date:    07/08/2025   Time:    08:42      X-Ray Pelvis Routine AP   Final Result      There is an acute appearing  fracture in the intertrochanteric portion of the left femur.         Electronically signed by: Zack King MD   Date:    07/08/2025   Time:    08:41      X-Ray Chest AP Portable   Final Result      1. There is no focal pulmonary infiltrate visualized.   2. There is moderate cardiomegaly.   .         Electronically signed by: Zack King MD   Date:    07/08/2025   Time:    08:39      CT Head Without Contrast   Final Result      1. There is no calvarial fracture or intracranial hemorrhage.   2. Right maxillary sinusitis   3. There is a 13 mm polyp versus mucous retention cyst in the left maxillary sinus.   All CT scans at this facility use dose modulation, iterative reconstruction, and/or weight base dosing when appropriate to reduce radiation dose when appropriate to reduce radiation dose to as low as reasonably achievable.         Electronically signed by: Zack King MD   Date:    07/08/2025   Time:    08:14          Inpatient Medications:  Continuous Infusions:   lactated ringers   Intravenous Continuous 75 mL/hr at 07/11/25 1706 Rate Verify at 07/11/25 1706       Scheduled Meds:   atorvastatin  20 mg Oral Daily    carvediloL  6.25 mg Oral BID WM    chlorhexidine  10 mL Mouth/Throat BID    flecainide  50 mg Oral Q12H    insulin glargine U-100  13 Units Subcutaneous QHS       PRN Meds:  Current Facility-Administered Medications:     0.9%  NaCl infusion (for blood administration), , Intravenous, Q24H PRN    0.9%  NaCl infusion (for blood administration), , Intravenous, Q24H PRN    acetaminophen, 650 mg, Oral, Q4H PRN    aluminum-magnesium hydroxide-simethicone, 30 mL, Oral, QID PRN    dextrose 50%, 12.5 g, Intravenous, PRN    dextrose 50%, 25 g, Intravenous, PRN    glucagon (human recombinant), 1 mg, Intramuscular, PRN    glucose, 16 g, Oral, PRN    glucose, 24 g, Oral, PRN    hydrALAZINE, 10 mg, Intravenous, Q6H PRN    HYDROcodone-acetaminophen, 1 tablet, Oral, Q4H PRN    insulin aspart U-100, 0-10  Units, Subcutaneous, QID (AC + HS) PRN    melatonin, 6 mg, Oral, Nightly PRN    morphine, 4 mg, Intravenous, Q4H PRN    naloxone, 0.02 mg, Intravenous, PRN    ondansetron, 4 mg, Intravenous, Q12H PRN    polyethylene glycol, 17 g, Oral, BID PRN    prochlorperazine, 5 mg, Intravenous, Q6H PRN    promethazine, 25 mg, Rectal, Q6H PRN    sodium chloride 0.9%, 10 mL, Intravenous, Q12H PRN            Assessment & Plan  Closed nondisplaced subtrochanteric fracture of left femur  Patient had a fall at home PTA, xray left femur: There is an acute appearing fracture in the intertrochanteric portion of the left femur.     Status post intramedullary maria t insertion per ortho  Physical/occupational therapy when able  Hb/hct drop   Hold ac   Type and screen   Monitor vitals     Liver cirrhosis  MELD-Na score calculated; MELD 3.0: 8 at 7/10/2025  5:13 AM  MELD-Na: 8 at 7/10/2025  5:13 AM  Calculated from:  Serum Creatinine: 0.7 mg/dL (Using min of 1 mg/dL) at 7/10/2025  5:13 AM  Serum Sodium: 137 mmol/L at 7/10/2025  5:13 AM  Total Bilirubin: 1.2 mg/dL at 7/10/2025  5:13 AM  Serum Albumin: 3.1 g/dL at 7/10/2025  5:13 AM  INR(ratio): 1.1 at 7/8/2025  7:39 AM  Age at listing (hypothetical): 60 years  Sex: Male at 7/10/2025  5:13 AM      Continue chronic meds. Etiology likely ETOH. Will avoid any hepatotoxic meds, and monitor CBC/CMP/INR for synthetic function.   PAF (paroxysmal atrial fibrillation)  Patient has paroxysmal (<7 days) atrial fibrillation. Patient is currently in sinus rhythm. [id number] Score: 1. The patients heart rate in the last 24 hours is as follows:  Pulse  Min: 91  Max: 115     Antiarrhythmics  flecainide tablet 50 mg, Every 12 hours, Oral    Anticoagulants       Plan  - Replete lytes with a goal of K>4, Mg >2  - Patient is not anticoagulated due to Eliquis on hold for surgery  - Patient's afib is currently controlled  Continue holding a/c due to drop in hb/hct postoperatively       Primary hypertension  Patient's  blood pressure range in the last 24 hours was: BP  Min: 96/49  Max: 129/70.The patient's inpatient anti-hypertensive regimen is listed below:  Current Antihypertensives  carvediloL tablet 6.25 mg, 2 times daily with meals, Oral  hydrALAZINE injection 10 mg, Every 6 hours PRN, Intravenous    Plan  - BP is controlled, no changes needed to their regimen    EVELYN on CPAP  --CPAP @ HS  Recommended patient bring home unit  At risk for hypercapnia     Insulin dependent type 2 diabetes mellitus  Patient's FSGs are controlled on current medication regimen.  Last A1c reviewed-   Lab Results   Component Value Date    HGBA1C 9.9 (H) 07/08/2025     Most recent fingerstick glucose reviewed-   Recent Labs   Lab 07/10/25  2105 07/11/25  0550 07/11/25  1140 07/11/25  1634   POCTGLUCOSE 305* 270* 317* 311*     Current correctional scale  Medium  Maintain anti-hyperglycemic dose as follows-   Antihyperglycemics (From admission, onward)      Start     Stop Route Frequency Ordered    07/11/25 2100  insulin glargine U-100 (Lantus) pen 13 Units         -- SubQ Nightly 07/08/25 1646    07/10/25 1722  insulin aspart U-100 pen 0-10 Units         -- SubQ Before meals & nightly PRN 07/10/25 1622          Hold Oral hypoglycemics while patient is in the hospital.  Diabetic education  Moderate dose sliding scale to promote wound healing  VTE Risk Mitigation (From admission, onward)           Ordered     IP VTE HIGH RISK PATIENT  Once         07/08/25 1052     Place sequential compression device  Until discontinued         07/08/25 1052     Reason for No Pharmacological VTE Prophylaxis  Once        Question:  Reasons:  Answer:  IV Heparin w/in 24 hrs. Pre or Post-Op    07/08/25 1052                    Discharge Planning   BRANDIE: 7/12/2025     Code Status: Full Code   Medical Readiness for Discharge Date:   Discharge Plan A: Home with family                Jon Portillo DO  Department of Hospital Medicine   [facility] - Telemetry ([facility])    Voice  recognition software was used in the creation of this note/communication and any sound-alike/typographical errors which may have occurred despite initial review prior to signing should be taken in context when interpreting.  If such errors prevent a clear understanding of the note/communication, please contact the provider/office for clarification.

## 2025-07-12 NOTE — PLAN OF CARE
A204/A204 LOKESHAshley Lopez is a 60 y.o.male admitted on 7/8/2025 for Closed nondisplaced subtrochanteric fracture of left femur   Code Status: Full Code MRN: [id number]   Review of patient's allergies indicates:   Allergen Reactions    1 plus 1-f Rash    Sulfa (sulfonamide antibiotics) Rash     Past Medical History:   Diagnosis Date    Diabetes mellitus     Hyperlipidemia     Hypertension     EVELYN (obstructive sleep apnea)     Paroxysmal atrial fibrillation       PRN meds    0.9%  NaCl infusion (for blood administration), , Q24H PRN  0.9%  NaCl infusion (for blood administration), , Q24H PRN  0.9%  NaCl infusion (for blood administration), , Q24H PRN  acetaminophen, 650 mg, Q4H PRN  aluminum-magnesium hydroxide-simethicone, 30 mL, QID PRN  dextrose 50%, 12.5 g, PRN  dextrose 50%, 25 g, PRN  glucagon (human recombinant), 1 mg, PRN  glucose, 16 g, PRN  glucose, 24 g, PRN  hydrALAZINE, 10 mg, Q6H PRN  HYDROcodone-acetaminophen, 1 tablet, Q4H PRN  insulin aspart U-100, 0-10 Units, QID (AC + HS) PRN  melatonin, 6 mg, Nightly PRN  morphine, 4 mg, Q4H PRN  naloxone, 0.02 mg, PRN  ondansetron, 4 mg, Q12H PRN  polyethylene glycol, 17 g, BID PRN  prochlorperazine, 5 mg, Q6H PRN  promethazine, 25 mg, Q6H PRN  sodium chloride 0.9%, 10 mL, Q12H PRN           Pt oriented x3.  VSS.  Pt remained afebrile throughout this shift.   All meds administered per order.   Pt remained free of falls this shift.   Plan of care reviewed. Patient verbalizes understanding.   Pt moving/turning max assist.  Bed low, side rails up x 2, wheels locked, call light in reach.   Patient instructed to call for assistance.  Patient education provided  Received 1 unit PRBC.     Problem: Adult Inpatient Plan of Care  Goal: Absence of Hospital-Acquired Illness or Injury  Outcome: Progressing                Orientation: oriented x 4  [place] Coma Scale Score: 15     Lead Monitored: Lead II Rhythm: normal sinus rhythm    Cardiac/Telemetry Box Number: [id number]  VTE  Core Measure: Pharmacological prophylaxis initiated/maintained Last Bowel Movement: 07/09/25  Diet Consistent Carbohydrate 2000 Calories (up to 75 gm per meal); Standard Tray  Voiding Characteristics: voids spontaneously without difficulty  Peyman Score: 17  Fall Risk Score: 13  Accucheck [x]   Freq? AC/HS     Lines/Drains/Airways       Peripheral Intravenous Line  Duration             Peripheral IV 07/11/25 1215 22 G Anterior;Right Forearm 1 day

## 2025-07-12 NOTE — PT/OT/SLP EVAL
Occupational Therapy   Evaluation    Name: Roland Lopez  MRN: [id number]  Admitting Diagnosis: Closed nondisplaced subtrochanteric fracture of left femur  Recent Surgery: Procedure(s) (LRB):  INSERTION, INTRAMEDULLARY COOKIE, FEMUR, TROCHANTER (Left) 2 Days Post-Op    Recommendations:     Discharge Recommendations: High Intensity Therapy  Discharge Equipment Recommendations:  walker, rolling  Barriers to discharge:       Assessment:     Roland Lopez is a 60 y.o. male with a medical diagnosis of Closed nondisplaced subtrochanteric fracture of left femur.  He presents with debility and generalized. Performance deficits affecting function: weakness, impaired functional mobility, impaired balance, decreased upper extremity function, decreased safety awareness, impaired endurance, impaired self care skills, gait instability, decreased ROM.      Rehab Prognosis: Good; patient would benefit from acute skilled OT services to address these deficits and reach maximum level of function.       Plan:     Patient to be seen 2 x/week to address the above listed problems via self-care/home management, therapeutic activities, therapeutic exercises  Plan of Care Expires: 07/26/25  Plan of Care Reviewed with: patient    Subjective     Chief Complaint: debility and generalized weakness  Patient/Family Comments/goals: get stronger    Occupational Profile:  Living Environment: lives with father in one story house with 1 step up   Previous level of function: (I) with adl's and functional mobility  Roles and Routines: still drives and works  Equipment Used at Home: grab bar, CPAP      Pain/Comfort:  Pain Rating 1: 0/10    Patients cultural, spiritual, Yarsanism conflicts given the current situation:      Objective:     Communicated with: nurse and epic chart review prior to session.  Patient found HOB elevated with peripheral IV upon OT entry to room.    General Precautions: Standard, fall  Orthopedic Precautions: N/A  Braces:  N/A  Respiratory Status: Room air    Occupational Performance:    Bed Mobility:    Patient completed Rolling/Turning to Left with  moderate assistance  Patient completed Rolling/Turning to Right with moderate assistance  Patient completed Scooting/Bridging with maximal assistance  Patient completed Supine to Sit with maximal assistance    Functional Mobility/Transfers:  Patient completed Sit <> Stand Transfer with dependence  with  rolling walker   Functional Mobility: unable  to perform activity    Activities of Daily Living:  Upper Body Dressing: minimum assistance .  Lower Body Dressing: maximal assistance .  Toileting: total assistance .    Cognitive/Visual Perceptual:  Cognitive/Psychosocial Skills:     -       Oriented to: Person, Place, Time, and Situation   -       Follows Commands/attention:Follows multistep  commands  -       Communication: clear/fluent  -       Memory: No Deficits noted  -       Safety awareness/insight to disability: impaired     Physical Exam:  Upper Extremity Range of Motion:     -       Right Upper Extremity: WFL  -       Left Upper Extremity: WFL  Upper Extremity Strength:    -       Right Upper Extremity: mmt: 3/5 grossly  -       Left Upper Extremity: mmt: 3/5 grossly   Strength:    -       Right Upper Extremity: mmt: 3/5 grossly  -       Left Upper Extremity: mmt: 3/5 grossly    AMPAC 6 Click ADL:  AMPAC Total Score: 14    Treatment & Education:  Educated patient on importance of increased tolerance to upright position and direct impact on CV endurance and strength. Patient encouraged to sit EOB, for a minimum of 2 consecutive hours including for all meals.. Encouraged patient to perform AROM TE to BUE throughout the day within all available planes of motion. Re enforced importance of utilizing call light to meet needs in room and not attempt to get up without staff assistance. Patient verbalized understanding and agreed to comply.         Patient left sitting edge of bed with  all lines intact, call button in reach, PCT notified, and father present    GOALS:   Multidisciplinary Problems       Occupational Therapy Goals          Problem: Occupational Therapy    Goal Priority Disciplines Outcome Interventions   Occupational Therapy Goal     OT, PT/OT     Description: O.T. GOALS TO BE MET 7-26-25  PT WILL TOLERATE 1 SET X 15 REPS B UE ROM EXERCISE  PT REQ MAX A WITH BSC  TRANSFERS  PT REQ S WITH TOILET ING  PT REQ S WITH UE DRESSING                       DME Justifications:   Roland's mobility limitation cannot be sufficiently resolved by the use of a cane. His functional mobility deficit can be sufficiently resolved with the use of a Rolling Walker. Patient's mobility limitation significantly impairs their ability to participate in one of more activities of daily living.  The use of a RW will significantly improve the patient's ability to participate in MRADLS and the patient will use it on regular basis in the home.    History:     Past Medical History:   Diagnosis Date    Diabetes mellitus     Hyperlipidemia     Hypertension     EVELYN (obstructive sleep apnea)     Paroxysmal atrial fibrillation          Past Surgical History:   Procedure Laterality Date    CYSTOSCOPY W/ RETROGRADES Right 10/9/2024    Procedure: CYSTOSCOPY, WITH RETROGRADE PYELOGRAM;  Surgeon: Shawn Otto MD;  Location: [facility];  Service: Urology;  Laterality: Right;    INTRAMEDULLARY RODDING OF TROCHANTER OF FEMUR Left 7/10/2025    Procedure: INSERTION, INTRAMEDULLARY COOKIE, FEMUR, TROCHANTER;  Surgeon: Kodi Frye Jr., MD;  Location: [facility] OR;  Service: Orthopedics;  Laterality: Left;    TRANSESOPHAGEAL ECHOCARDIOGRAM WITH POSSIBLE CARDIOVERSION (SOFYA W/ POSS CARDIOVERSION) N/A 6/27/2023    Procedure: Transesophageal echo (SOFYA) intra-procedure log documentation;  Surgeon: Santy Martinez MD;  Location: [facility] CATH LAB;  Service: Cardiology;  Laterality: N/A;       Time Tracking:     OT Date of Treatment:  07/12/25  OT Start Time: 0955  OT Stop Time: 1033  OT Total Time (min): 38 min    Billable Minutes:Evaluation 15 minutes  Therapeutic Activity 23 minutes    7/12/2025

## 2025-07-12 NOTE — ASSESSMENT & PLAN NOTE
Patient's FSGs are controlled on current medication regimen.  Last A1c reviewed-   Lab Results   Component Value Date    HGBA1C 9.9 (H) 07/08/2025     Most recent fingerstick glucose reviewed-   Recent Labs   Lab 07/10/25  2105 07/11/25  0550 07/11/25  1140 07/11/25  1634   POCTGLUCOSE 305* 270* 317* 311*     Current correctional scale  Medium  Maintain anti-hyperglycemic dose as follows-   Antihyperglycemics (From admission, onward)      Start     Stop Route Frequency Ordered    07/11/25 2100  insulin glargine U-100 (Lantus) pen 13 Units         -- SubQ Nightly 07/08/25 1646    07/10/25 1722  insulin aspart U-100 pen 0-10 Units         -- SubQ Before meals & nightly PRN 07/10/25 1622          Hold Oral hypoglycemics while patient is in the hospital.  Diabetic education  Moderate dose sliding scale to promote wound healing

## 2025-07-12 NOTE — PLAN OF CARE
Problem: Adult Inpatient Plan of Care  Goal: Plan of Care Review  Outcome: Ongoing  Goal: Patient-Specific Goal (Individualized)  Outcome: Ongoing  Goal: Absence of Hospital-Acquired Illness or Injury  Outcome: Ongoing  Goal: Optimal Comfort and Wellbeing  Outcome: Ongoing  Goal: Readiness for Transition of Care  Outcome: Ongoing     Problem: Fall Injury Risk  Goal: Absence of Fall and Fall-Related Injury  Outcome: Ongoing

## 2025-07-13 LAB
ABSOLUTE EOSINOPHIL (OHS): 0.12 K/UL
ABSOLUTE EOSINOPHIL (OHS): 0.18 K/UL
ABSOLUTE MONOCYTE (OHS): 0.78 K/UL (ref 0.3–1)
ABSOLUTE MONOCYTE (OHS): 0.87 K/UL (ref 0.3–1)
ABSOLUTE NEUTROPHIL COUNT (OHS): 5.5 K/UL (ref 1.8–7.7)
ABSOLUTE NEUTROPHIL COUNT (OHS): 7.63 K/UL (ref 1.8–7.7)
ALBUMIN SERPL BCP-MCNC: 2.6 G/DL (ref 3.5–5.2)
ALP SERPL-CCNC: 59 UNIT/L (ref 40–150)
ALT SERPL W/O P-5'-P-CCNC: 29 UNIT/L (ref 10–44)
ANION GAP (OHS): 9 MMOL/L (ref 8–16)
AST SERPL-CCNC: 42 UNIT/L (ref 11–45)
BASOPHILS # BLD AUTO: 0.03 K/UL
BASOPHILS # BLD AUTO: 0.04 K/UL
BASOPHILS NFR BLD AUTO: 0.4 %
BASOPHILS NFR BLD AUTO: 0.4 %
BILIRUB SERPL-MCNC: 1.3 MG/DL (ref 0.1–1)
BUN SERPL-MCNC: 30 MG/DL (ref 6–20)
CALCIUM SERPL-MCNC: 8.6 MG/DL (ref 8.7–10.5)
CHLORIDE SERPL-SCNC: 106 MMOL/L (ref 95–110)
CO2 SERPL-SCNC: 24 MMOL/L (ref 23–29)
CREAT SERPL-MCNC: 0.8 MG/DL (ref 0.5–1.4)
ERYTHROCYTE [DISTWIDTH] IN BLOOD BY AUTOMATED COUNT: 14.1 % (ref 11.5–14.5)
ERYTHROCYTE [DISTWIDTH] IN BLOOD BY AUTOMATED COUNT: 14.4 % (ref 11.5–14.5)
GFR SERPLBLD CREATININE-BSD FMLA CKD-EPI: >60 ML/MIN/1.73/M2
GLUCOSE SERPL-MCNC: 218 MG/DL (ref 70–110)
HCT VFR BLD AUTO: 20.2 % (ref 40–54)
HCT VFR BLD AUTO: 24.6 % (ref 40–54)
HGB BLD-MCNC: 6.9 GM/DL (ref 14–18)
HGB BLD-MCNC: 8.1 GM/DL (ref 14–18)
IMM GRANULOCYTES # BLD AUTO: 0.04 K/UL (ref 0–0.04)
IMM GRANULOCYTES # BLD AUTO: 0.05 K/UL (ref 0–0.04)
IMM GRANULOCYTES NFR BLD AUTO: 0.5 % (ref 0–0.5)
IMM GRANULOCYTES NFR BLD AUTO: 0.5 % (ref 0–0.5)
LYMPHOCYTES # BLD AUTO: 1.11 K/UL (ref 1–4.8)
LYMPHOCYTES # BLD AUTO: 1.24 K/UL (ref 1–4.8)
MAGNESIUM SERPL-MCNC: 2.2 MG/DL (ref 1.6–2.6)
MCH RBC QN AUTO: 29.1 PG (ref 27–31)
MCH RBC QN AUTO: 29.4 PG (ref 27–31)
MCHC RBC AUTO-ENTMCNC: 32.9 G/DL (ref 32–36)
MCHC RBC AUTO-ENTMCNC: 34.2 G/DL (ref 32–36)
MCV RBC AUTO: 86 FL (ref 82–98)
MCV RBC AUTO: 89 FL (ref 82–98)
NUCLEATED RBC (/100WBC) (OHS): 0 /100 WBC
NUCLEATED RBC (/100WBC) (OHS): 0 /100 WBC
PHOSPHATE SERPL-MCNC: 1.9 MG/DL (ref 2.7–4.5)
PLATELET # BLD AUTO: 149 K/UL (ref 150–450)
PLATELET # BLD AUTO: 188 K/UL (ref 150–450)
PMV BLD AUTO: 10.1 FL (ref 9.2–12.9)
PMV BLD AUTO: 9.4 FL (ref 9.2–12.9)
POCT GLUCOSE: 257 MG/DL (ref 70–110)
POCT GLUCOSE: 265 MG/DL (ref 70–110)
POCT GLUCOSE: 271 MG/DL (ref 70–110)
POCT GLUCOSE: 297 MG/DL (ref 70–110)
POTASSIUM SERPL-SCNC: 3.5 MMOL/L (ref 3.5–5.1)
PROT SERPL-MCNC: 6.4 GM/DL (ref 6–8.4)
RBC # BLD AUTO: 2.35 M/UL (ref 4.6–6.2)
RBC # BLD AUTO: 2.78 M/UL (ref 4.6–6.2)
RELATIVE EOSINOPHIL (OHS): 1.6 %
RELATIVE EOSINOPHIL (OHS): 1.8 %
RELATIVE LYMPHOCYTE (OHS): 12.5 % (ref 18–48)
RELATIVE LYMPHOCYTE (OHS): 14.5 % (ref 18–48)
RELATIVE MONOCYTE (OHS): 11.3 % (ref 4–15)
RELATIVE MONOCYTE (OHS): 7.9 % (ref 4–15)
RELATIVE NEUTROPHIL (OHS): 71.7 % (ref 38–73)
RELATIVE NEUTROPHIL (OHS): 76.9 % (ref 38–73)
SODIUM SERPL-SCNC: 139 MMOL/L (ref 136–145)
WBC # BLD AUTO: 7.67 K/UL (ref 3.9–12.7)
WBC # BLD AUTO: 9.92 K/UL (ref 3.9–12.7)

## 2025-07-13 PROCEDURE — 84100 ASSAY OF PHOSPHORUS: CPT | Performed by: STUDENT IN AN ORGANIZED HEALTH CARE EDUCATION/TRAINING PROGRAM

## 2025-07-13 PROCEDURE — 94761 N-INVAS EAR/PLS OXIMETRY MLT: CPT

## 2025-07-13 PROCEDURE — 36415 COLL VENOUS BLD VENIPUNCTURE: CPT | Performed by: STUDENT IN AN ORGANIZED HEALTH CARE EDUCATION/TRAINING PROGRAM

## 2025-07-13 PROCEDURE — 94799 UNLISTED PULMONARY SVC/PX: CPT

## 2025-07-13 PROCEDURE — 94660 CPAP INITIATION&MGMT: CPT

## 2025-07-13 PROCEDURE — 97162 PT EVAL MOD COMPLEX 30 MIN: CPT

## 2025-07-13 PROCEDURE — 25000003 PHARM REV CODE 250: Performed by: ORTHOPAEDIC SURGERY

## 2025-07-13 PROCEDURE — 80053 COMPREHEN METABOLIC PANEL: CPT | Performed by: STUDENT IN AN ORGANIZED HEALTH CARE EDUCATION/TRAINING PROGRAM

## 2025-07-13 PROCEDURE — 63600175 PHARM REV CODE 636 W HCPCS: Performed by: STUDENT IN AN ORGANIZED HEALTH CARE EDUCATION/TRAINING PROGRAM

## 2025-07-13 PROCEDURE — 85025 COMPLETE CBC W/AUTO DIFF WBC: CPT | Performed by: STUDENT IN AN ORGANIZED HEALTH CARE EDUCATION/TRAINING PROGRAM

## 2025-07-13 PROCEDURE — 99900035 HC TECH TIME PER 15 MIN (STAT)

## 2025-07-13 PROCEDURE — 25000003 PHARM REV CODE 250: Performed by: STUDENT IN AN ORGANIZED HEALTH CARE EDUCATION/TRAINING PROGRAM

## 2025-07-13 PROCEDURE — 21400001 HC TELEMETRY ROOM

## 2025-07-13 PROCEDURE — 63600175 PHARM REV CODE 636 W HCPCS: Performed by: FAMILY MEDICINE

## 2025-07-13 PROCEDURE — 25000003 PHARM REV CODE 250: Performed by: NURSE PRACTITIONER

## 2025-07-13 PROCEDURE — 97530 THERAPEUTIC ACTIVITIES: CPT

## 2025-07-13 PROCEDURE — 99024 POSTOP FOLLOW-UP VISIT: CPT | Mod: ,,, | Performed by: ORTHOPAEDIC SURGERY

## 2025-07-13 PROCEDURE — 83735 ASSAY OF MAGNESIUM: CPT | Performed by: STUDENT IN AN ORGANIZED HEALTH CARE EDUCATION/TRAINING PROGRAM

## 2025-07-13 PROCEDURE — 27100171 HC OXYGEN HIGH FLOW UP TO 24 HOURS

## 2025-07-13 RX ORDER — SODIUM,POTASSIUM PHOSPHATES 280-250MG
2 POWDER IN PACKET (EA) ORAL
Status: COMPLETED | OUTPATIENT
Start: 2025-07-13 | End: 2025-07-13

## 2025-07-13 RX ORDER — ENOXAPARIN SODIUM 100 MG/ML
40 INJECTION SUBCUTANEOUS 2 TIMES DAILY
Status: DISCONTINUED | OUTPATIENT
Start: 2025-07-13 | End: 2025-07-13

## 2025-07-13 RX ADMIN — HYDROCODONE BITARTRATE AND ACETAMINOPHEN 1 TABLET: 5; 325 TABLET ORAL at 01:07

## 2025-07-13 RX ADMIN — CHLORHEXIDINE GLUCONATE 0.12% ORAL RINSE 10 ML: 1.2 LIQUID ORAL at 08:07

## 2025-07-13 RX ADMIN — INSULIN ASPART 3 UNITS: 100 INJECTION, SOLUTION INTRAVENOUS; SUBCUTANEOUS at 08:07

## 2025-07-13 RX ADMIN — Medication 2 PACKET: at 01:07

## 2025-07-13 RX ADMIN — INSULIN ASPART 6 UNITS: 100 INJECTION, SOLUTION INTRAVENOUS; SUBCUTANEOUS at 11:07

## 2025-07-13 RX ADMIN — ENOXAPARIN SODIUM 40 MG: 40 INJECTION SUBCUTANEOUS at 05:07

## 2025-07-13 RX ADMIN — Medication 2 PACKET: at 05:07

## 2025-07-13 RX ADMIN — FLECAINIDE ACETATE 50 MG: 50 TABLET ORAL at 10:07

## 2025-07-13 RX ADMIN — ATORVASTATIN CALCIUM 20 MG: 10 TABLET, FILM COATED ORAL at 10:07

## 2025-07-13 RX ADMIN — INSULIN ASPART 6 UNITS: 100 INJECTION, SOLUTION INTRAVENOUS; SUBCUTANEOUS at 06:07

## 2025-07-13 RX ADMIN — CARVEDILOL 6.25 MG: 6.25 TABLET, FILM COATED ORAL at 05:07

## 2025-07-13 RX ADMIN — Medication 2 PACKET: at 11:07

## 2025-07-13 RX ADMIN — INSULIN GLARGINE 13 UNITS: 100 INJECTION, SOLUTION SUBCUTANEOUS at 08:07

## 2025-07-13 RX ADMIN — INSULIN ASPART 6 UNITS: 100 INJECTION, SOLUTION INTRAVENOUS; SUBCUTANEOUS at 05:07

## 2025-07-13 RX ADMIN — FLECAINIDE ACETATE 50 MG: 50 TABLET ORAL at 08:07

## 2025-07-13 RX ADMIN — CARVEDILOL 6.25 MG: 6.25 TABLET, FILM COATED ORAL at 10:07

## 2025-07-13 RX ADMIN — CHLORHEXIDINE GLUCONATE 0.12% ORAL RINSE 10 ML: 1.2 LIQUID ORAL at 10:07

## 2025-07-13 NOTE — SUBJECTIVE & OBJECTIVE
Interval History: No acute events overnight, afebrile, hemodynamically stable.  Transfused another 1u pRbc for post-op anemia with Hgb<7. Renal function improving after started on fluid resuscitation.  PT/OT with recs for high intensity therapy, but patient declined and preferred home health insteadPT/OT with recs for high intensity therapy, but patient declined and preferred home health instead      Objective:     Vital Signs (Most Recent):  Temp: 98.3 °F (36.8 °C) (07/12/25 2018)  Pulse: 78 (07/12/25 2143)  Resp: (!) 30 (07/12/25 2143)  BP: (!) 152/77 (07/12/25 2018)  SpO2: 97 % (07/12/25 2143) Vital Signs (24h Range):  Temp:  [97.9 °F (36.6 °C)-98.3 °F (36.8 °C)] 98.3 °F (36.8 °C)  Pulse:  [70-97] 78  Resp:  [17-30] 30  SpO2:  [90 %-100 %] 97 %  BP: (119-166)/(56-77) 152/77     Weight: (!) 150.7 kg (332 lb 3.7 oz)  Body mass index is 42.66 kg/m².    Intake/Output Summary (Last 24 hours) at 7/12/2025 2244  Last data filed at 7/12/2025 2018  Gross per 24 hour   Intake 3097.29 ml   Output 2050 ml   Net 1047.29 ml         Physical Exam  Vitals and nursing note reviewed.   Constitutional:       General: He is not in acute distress.     Appearance: He is morbidly obese. He is ill-appearing. He is not toxic-appearing.   HENT:      Head: Normocephalic and atraumatic.   Eyes:      General: No scleral icterus.        Right eye: No discharge.         Left eye: No discharge.   Cardiovascular:      Rate and Rhythm: Normal rate.   Pulmonary:      Effort: Pulmonary effort is normal. No respiratory distress.   Abdominal:      Palpations: Abdomen is soft.      Tenderness: There is no abdominal tenderness.   Musculoskeletal:        Legs:    Skin:     General: Skin is warm.      Coloration: Skin is pale. Skin is not jaundiced.   Neurological:      Mental Status: He is alert and easily aroused.      Motor: Weakness present.               Significant Labs: All pertinent labs within the past 24 hours have been reviewed.   Recent Labs    Lab 07/10/25  0513 07/11/25  0512 07/12/25  0921    133* 133*   K 4.0 4.2 4.0    102 102   CO2 26 21* 24   BUN 14 39* 47*   CREATININE 0.7 2.7* 1.4   * 286* 279*   ANIONGAP 6* 10 7*     Recent Labs   Lab 07/10/25  0513 07/11/25  0512 07/12/25  0921   MG 1.7 1.8 2.0   PHOS  --   --  2.7     Recent Labs   Lab 07/10/25  0513 07/11/25  0512 07/12/25  0921   AST 17 40 41   ALT 19 24 22   ALKPHOS 58 46 50   BILITOT 1.2* 0.6 1.0   ALBUMIN 3.1* 2.8* 2.4*     POCT Glucose:   Recent Labs   Lab 07/12/25  1128 07/12/25  1634 07/12/25  2049   POCTGLUCOSE 334* 287* 287*    Recent Labs   Lab 07/10/25  0512 07/10/25  1128 07/11/25  0512 07/12/25  0714   WBC 8.45  --  14.98* 9.69   HGB 10.9* 8.5* 6.8* 6.6*   HCT 33.6* 26.0* 21.6* 20.3*   *  --  177 158                         Significant Imaging:  I have reviewed all pertinent imaging results/findings within the past 24 hours.   X-Ray Femur 2 View Left   Final Result      Satisfactory postoperative study.         Electronically signed by: Darion Ricci MD   Date:    07/10/2025   Time:    11:50      SURG FL Surgery Fluoro Usage   Final Result      X-Ray Femur 2 View Left   Final Result      See above.         Electronically signed by: Kadeem Taylor MD   Date:    07/10/2025   Time:    10:08      X-Ray Femur Ap/Lat Left   Final Result      There is an acute appearing fracture in the trochanteric portion of the left femur.         Electronically signed by: Zack King MD   Date:    07/08/2025   Time:    08:42      X-Ray Pelvis Routine AP   Final Result      There is an acute appearing fracture in the intertrochanteric portion of the left femur.         Electronically signed by: Zack King MD   Date:    07/08/2025   Time:    08:41      X-Ray Chest AP Portable   Final Result      1. There is no focal pulmonary infiltrate visualized.   2. There is moderate cardiomegaly.   .         Electronically signed by: Zack King MD   Date:    07/08/2025    Time:    08:39      CT Head Without Contrast   Final Result      1. There is no calvarial fracture or intracranial hemorrhage.   2. Right maxillary sinusitis   3. There is a 13 mm polyp versus mucous retention cyst in the left maxillary sinus.   All CT scans at this facility use dose modulation, iterative reconstruction, and/or weight base dosing when appropriate to reduce radiation dose when appropriate to reduce radiation dose to as low as reasonably achievable.         Electronically signed by: Zack King MD   Date:    07/08/2025   Time:    08:14          Inpatient Medications:    Scheduled Meds:   atorvastatin  20 mg Oral Daily    carvediloL  6.25 mg Oral BID WM    chlorhexidine  10 mL Mouth/Throat BID    flecainide  50 mg Oral Q12H    insulin glargine U-100  13 Units Subcutaneous QHS       PRN Meds:  Current Facility-Administered Medications:     0.9%  NaCl infusion (for blood administration), , Intravenous, Q24H PRN    0.9%  NaCl infusion (for blood administration), , Intravenous, Q24H PRN    0.9%  NaCl infusion (for blood administration), , Intravenous, Q24H PRN    acetaminophen, 650 mg, Oral, Q4H PRN    aluminum-magnesium hydroxide-simethicone, 30 mL, Oral, QID PRN    dextrose 50%, 12.5 g, Intravenous, PRN    dextrose 50%, 25 g, Intravenous, PRN    glucagon (human recombinant), 1 mg, Intramuscular, PRN    glucose, 16 g, Oral, PRN    glucose, 24 g, Oral, PRN    hydrALAZINE, 10 mg, Intravenous, Q6H PRN    HYDROcodone-acetaminophen, 1 tablet, Oral, Q4H PRN    insulin aspart U-100, 0-10 Units, Subcutaneous, QID (AC + HS) PRN    melatonin, 6 mg, Oral, Nightly PRN    morphine, 4 mg, Intravenous, Q4H PRN    naloxone, 0.02 mg, Intravenous, PRN    ondansetron, 4 mg, Intravenous, Q12H PRN    polyethylene glycol, 17 g, Oral, BID PRN    prochlorperazine, 5 mg, Intravenous, Q6H PRN    promethazine, 25 mg, Rectal, Q6H PRN    sodium chloride 0.9%, 10 mL, Intravenous, Q12H PRN

## 2025-07-13 NOTE — PLAN OF CARE
Assisted patient back to bed, Mod A x2 with pivot transfer with RW and maintaining WB restrictions. Continue per POC.

## 2025-07-13 NOTE — PT/OT/SLP EVAL
"Physical Therapy Evaluation    Patient Name:  Roland Lopez   MRN:  [id number]    Recommendations:     Discharge Recommendations: High Intensity Therapy   Discharge Equipment Recommendations: to be determined by next level of care   Barriers to discharge: Decreased caregiver support    Assessment:     Roland Lopez is a 60 y.o. male admitted with a medical diagnosis of Closed nondisplaced subtrochanteric fracture of left femur.  He presents with the following impairments/functional limitations: impaired balance, weakness, decreased safety awareness, impaired endurance, pain, impaired self care skills, decreased coordination, decreased ROM, impaired functional mobility, impaired coordination, impaired muscle length, gait instability, decreased lower extremity function.    Rehab Prognosis: Good; patient would benefit from acute skilled PT services to address these deficits and reach maximum level of function.    Recent Surgery: Procedure(s) (LRB):  INSERTION, INTRAMEDULLARY COOKIE, FEMUR, TROCHANTER (Left) 3 Days Post-Op    Plan:     During this hospitalization, patient to be seen 3 x/week to address the identified rehab impairments via therapeutic exercises, therapeutic activities, gait training and progress toward the following goals:    Plan of Care Expires:  07/27/25    Subjective     Chief Complaint: "I need to get better so I can help my dad at home."  Patient/Family Comments/goals: Return to prior level of independence.   Pain/Comfort:  Pain Rating 1: 6/10  Location - Side 1: Left  Location - Orientation 1: upper  Location 1: hip  Pain Addressed 1: Cessation of Activity, Nurse notified, Distraction  Pain Rating Post-Intervention 1: 6/10    Patients cultural, spiritual, Yazidi conflicts given the current situation: no    Living Environment:    Patient lives with is father in a single level home with one step to enter.  Prior to admission, patients level of function was independent, working and driving. 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 Equipment used at home: CPAP, grab bar.  DME owned (not currently used): none.  Upon discharge, patient will have assistance from unknown.    Objective:     Communicated with RN prior to session.  Patient found supine with peripheral IV, telemetry  upon PT entry to room.    General Precautions: Standard, fall  Orthopedic Precautions:LLE toe touch weight bearing   Braces: N/A  Respiratory Status: Room air    Exams:  Cognitive Exam:  Patient is oriented to Person, Place, Time, and Situation  RLE ROM: WFL  RLE Strength: WFL  LLE ROM: WFL except NT at hip due to pain  LLE Strength: WFL except NT at hip due to pain    Functional Mobility:  Bed Mobility:     Rolling Right: moderate assistance  Supine to Sit: maximal assistance  Transfers:     Sit to Stand:  moderate assistance and of 2 persons with rolling walker  Bed to Chair: moderate assistance and of 2 persons with  rolling walker  using  Stand Pivot      AM-PAC 6 CLICK MOBILITY  Total Score:10       Treatment & Education:    Patient found supine in bed upon PT arrival to room. PT provided education on role of PT, POC and LLE WB precautions.     Patient completed:     Sup>sit: Max A. Patient with difficultly advancing LLE to EOB. Patient required Max A to advance LLE and cues to roll unto right side and utilize bed rail to push upper body into seated EOB position.     STS: Mod A x2 with RW. Cues given to place left hand on RW and right hand to push from bed rail. Patient able to complete transfer with bed elevated, utilizing BUE and RLE to complete transfer.     Transfer: Once standing, cues given to pivot on RLE with toe down only on LLE and utilizing BUE to push through RW. Patient able to complete pivot transfer, Mod A x2. Cues given to reach back for arm rest of chair to safely lower body into seated position.     Patient left up in chair with all lines intact, call button in reach, and RN present.    GOALS:    Multidisciplinary Problems       Physical Therapy Goals          Problem: Physical Therapy    Goal Priority Disciplines Outcome Interventions   Physical Therapy Goal     PT, PT/OT     Description: Patient will be seen a minimal of 3 out of 7 days a week.    LTG to be met by 07/27/25:     Bed mobility: Min A  Transfers: Min A with RW  Gait: Min A with RW x20 feet                        DME Justifications:  No DME recommended requiring DME justifications    History:     Past Medical History:   Diagnosis Date    Diabetes mellitus     Hyperlipidemia     Hypertension     EVELYN (obstructive sleep apnea)     Paroxysmal atrial fibrillation        Past Surgical History:   Procedure Laterality Date    CYSTOSCOPY W/ RETROGRADES Right 10/9/2024    Procedure: CYSTOSCOPY, WITH RETROGRADE PYELOGRAM;  Surgeon: Shawn Otto MD;  Location: [facility] OR;  Service: Urology;  Laterality: Right;    INTRAMEDULLARY RODDING OF TROCHANTER OF FEMUR Left 7/10/2025    Procedure: INSERTION, INTRAMEDULLARY COOKIE, FEMUR, TROCHANTER;  Surgeon: Kodi Frye Jr., MD;  Location: [facility] OR;  Service: Orthopedics;  Laterality: Left;    TRANSESOPHAGEAL ECHOCARDIOGRAM WITH POSSIBLE CARDIOVERSION (SOFYA W/ POSS CARDIOVERSION) N/A 6/27/2023    Procedure: Transesophageal echo (SOFYA) intra-procedure log documentation;  Surgeon: Santy Martinez MD;  Location: [facility] CATH LAB;  Service: Cardiology;  Laterality: N/A;       Time Tracking:     PT Received On: 07/13/25  PT Start Time: 1020     PT Stop Time: 1045  PT Total Time (min): 25 min     Billable Minutes: Evaluation 12 and Therapeutic Activity 13      07/13/2025

## 2025-07-13 NOTE — PROGRESS NOTES
Pharmacist Renal Dose Adjustment Note    Roland Lopez is a 60 y.o. male being treated with the medication enoxaparin.     Patient Data:    Vital Signs (Most Recent):  Temp: 97.6 °F (36.4 °C) (07/13/25 0724)  Pulse: 72 (07/13/25 0724)  Resp: 17 (07/13/25 0724)  BP: 132/68 (07/13/25 0724)  SpO2: 100 % (07/13/25 1100) Vital Signs (72h Range):  Temp:  [97.5 °F (36.4 °C)-98.6 °F (37 °C)]   Pulse:  []   Resp:  [13-30]   BP: ()/(49-88)   SpO2:  [90 %-100 %]      Recent Labs   Lab 07/11/25  0512 07/12/25  0921 07/13/25  0937   CREATININE 2.7* 1.4 0.8     Serum creatinine: 0.8 mg/dL 07/13/25 0937  Estimated creatinine clearance: 154.7 mL/min  BMI: 42.94    Enoxaparin 40 mg subq every 24 hours will be changed to enoxaparin 40 mg subq every 12 hours per renal dose protocol for BMI 40-50 and CrCl > 30 ml/min.     Thank you,  Pharmacist's Name: Osito Linder

## 2025-07-13 NOTE — PLAN OF CARE
Discussed poc with pt, pt verbalized understanding    Purposeful rounding every 2hours    VS monitored as ordered    Cardiac monitoring in use, pt is NSR, tele monitor # [id number]    Blood glucose monitoring, supplemental coverage given    Fall precautions in place, remains injury free    Pain under control with PRN meds    IV flushed/saline locked    Accurate I&Os    Abx given as prescribed    Bed locked at lowest position    Call light within reach    Chart check complete    Will cont with POC

## 2025-07-13 NOTE — ASSESSMENT & PLAN NOTE
Patient has paroxysmal (<7 days) atrial fibrillation. Patient is currently in sinus rhythm. [id number] Score: 1. The patients heart rate in the last 24 hours is as follows:  Pulse  Min: 70  Max: 97     Antiarrhythmics  flecainide tablet 50 mg, Every 12 hours, Oral    Anticoagulants       Plan  - Replete lytes with a goal of K>4, Mg >2  - Patient is not anticoagulated due to Eliquis on hold for surgery  - Patient's afib is currently controlled  Continue holding a/c due to drop in hb/hct postoperatively

## 2025-07-13 NOTE — PROGRESS NOTES
[facility] Medicine  Progress Note    Patient Name: Roland Lopez  MRN: [id number]  Patient Class: IP- Inpatient   Admission Date: 7/8/2025  Length of Stay: 4 days  Attending Physician: Jon Portillo DO  Primary Care Provider: Emilee, Primary Doctor        Subjective     Principal Problem:Closed nondisplaced subtrochanteric fracture of left femur        HPI:  60 y.o. male with a PMHx of HTN, EVELYN, DM, HLD, and paroxysmal A fib who presented to the Emergency Department for evaluation of a fall which occurred this morning. Pt state he was getting ready for work when he slipped on what he thinks was cat urine in his home. Pt tried to use the installed railings in his home but could not catch himself. Symptoms are constant and moderate in severity. No mitigating or exacerbating factors reported. Associated sxs include left leg pain and left hip pain. Patient denies any nausea or LOC, numbness or tingling. No prior Tx specified. Pt reports he takes eliquis. No further complaints or concerns at this time.   ED course: H/H: 13.3/40.4, Plt 157K, glucose 280, PTT: Wnl, PT/INR:WNL. Given 4mg Morphine for pain in ED.     Left Femur xray, Pelvic xray: There is an acute appearing fracture in the trochanteric portion of the left femur.   CXR: no acute findings, CT head: There is no calvarial fracture or intracranial hemorrhage, other findings insignificant  Consulted Orthopedic Surgery, recommended admission for surgery later today.  I note some minor lab abnormalities that have been stable over time, these are of doubtful clinical significance.       Patient is a full code.   After evaluating the patients clinical presentation, vitals, labs, and risk factors, the attending physician and I decided to admit the patient for monitoring, diagnostics, and treatment due to the risk of deterioration if managed outpatient   Admitted to  for management of Left Femur Fracture.     Overview/Hospital  Course:  7/9 admitted for acute left hip fracture status post slip and fall. Ortho consulted and recommended surgical intervention. Due to recent use of ozempic, surgery for today after being npo x 24h+h. Complains of pain. Denies any problems with anesthesia in the past.   7/10 status post intramedullary maria t insertion for femur fracture. Tolerated well. Physical/occupational therapy when able.  7/11 Transfused 1 u pRbc for post-op anemia with Hgb<7. Renal function concerning for TRACY, started on fluid resuscitation.  7/12:  Transfused another 1u pRbc for post-op anemia with Hgb<7. Renal function improving after started on fluid resuscitation.  PT/OT with recs for high intensity therapy, but patient declined and preferred home health instead    Interval History: No acute events overnight, afebrile, hemodynamically stable.  Transfused another 1u pRbc for post-op anemia with Hgb<7. Renal function improving after started on fluid resuscitation.  PT/OT with recs for high intensity therapy, but patient declined and preferred home health insteadPT/OT with recs for high intensity therapy, but patient declined and preferred home health instead      Objective:     Vital Signs (Most Recent):  Temp: 98.3 °F (36.8 °C) (07/12/25 2018)  Pulse: 78 (07/12/25 2143)  Resp: (!) 30 (07/12/25 2143)  BP: (!) 152/77 (07/12/25 2018)  SpO2: 97 % (07/12/25 2143) Vital Signs (24h Range):  Temp:  [97.9 °F (36.6 °C)-98.3 °F (36.8 °C)] 98.3 °F (36.8 °C)  Pulse:  [70-97] 78  Resp:  [17-30] 30  SpO2:  [90 %-100 %] 97 %  BP: (119-166)/(56-77) 152/77     Weight: (!) 150.7 kg (332 lb 3.7 oz)  Body mass index is 42.66 kg/m².    Intake/Output Summary (Last 24 hours) at 7/12/2025 2244  Last data filed at 7/12/2025 2018  Gross per 24 hour   Intake 3097.29 ml   Output 2050 ml   Net 1047.29 ml         Physical Exam  Vitals and nursing note reviewed.   Constitutional:       General: He is not in acute distress.     Appearance: He is morbidly obese. He is  ill-appearing. He is not toxic-appearing.   HENT:      Head: Normocephalic and atraumatic.   Eyes:      General: No scleral icterus.        Right eye: No discharge.         Left eye: No discharge.   Cardiovascular:      Rate and Rhythm: Normal rate.   Pulmonary:      Effort: Pulmonary effort is normal. No respiratory distress.   Abdominal:      Palpations: Abdomen is soft.      Tenderness: There is no abdominal tenderness.   Musculoskeletal:        Legs:    Skin:     General: Skin is warm.      Coloration: Skin is pale. Skin is not jaundiced.   Neurological:      Mental Status: He is alert and easily aroused.      Motor: Weakness present.               Significant Labs: All pertinent labs within the past 24 hours have been reviewed.   Recent Labs   Lab 07/10/25  0513 07/11/25  0512 07/12/25  0921    133* 133*   K 4.0 4.2 4.0    102 102   CO2 26 21* 24   BUN 14 39* 47*   CREATININE 0.7 2.7* 1.4   * 286* 279*   ANIONGAP 6* 10 7*     Recent Labs   Lab 07/10/25  0513 07/11/25  0512 07/12/25  0921   MG 1.7 1.8 2.0   PHOS  --   --  2.7     Recent Labs   Lab 07/10/25  0513 07/11/25  0512 07/12/25  0921   AST 17 40 41   ALT 19 24 22   ALKPHOS 58 46 50   BILITOT 1.2* 0.6 1.0   ALBUMIN 3.1* 2.8* 2.4*     POCT Glucose:   Recent Labs   Lab 07/12/25  1128 07/12/25  1634 07/12/25  2049   POCTGLUCOSE 334* 287* 287*    Recent Labs   Lab 07/10/25  0512 07/10/25  1128 07/11/25  0512 07/12/25  0714   WBC 8.45  --  14.98* 9.69   HGB 10.9* 8.5* 6.8* 6.6*   HCT 33.6* 26.0* 21.6* 20.3*   *  --  177 158                         Significant Imaging:  I have reviewed all pertinent imaging results/findings within the past 24 hours.   X-Ray Femur 2 View Left   Final Result      Satisfactory postoperative study.         Electronically signed by: Darion Ricci MD   Date:    07/10/2025   Time:    11:50      SURG FL Surgery Fluoro Usage   Final Result      X-Ray Femur 2 View Left   Final Result      See above.          Electronically signed by: Kadeem Taylor MD   Date:    07/10/2025   Time:    10:08      X-Ray Femur Ap/Lat Left   Final Result      There is an acute appearing fracture in the trochanteric portion of the left femur.         Electronically signed by: Zack King MD   Date:    07/08/2025   Time:    08:42      X-Ray Pelvis Routine AP   Final Result      There is an acute appearing fracture in the intertrochanteric portion of the left femur.         Electronically signed by: Zack King MD   Date:    07/08/2025   Time:    08:41      X-Ray Chest AP Portable   Final Result      1. There is no focal pulmonary infiltrate visualized.   2. There is moderate cardiomegaly.   .         Electronically signed by: Zack King MD   Date:    07/08/2025   Time:    08:39      CT Head Without Contrast   Final Result      1. There is no calvarial fracture or intracranial hemorrhage.   2. Right maxillary sinusitis   3. There is a 13 mm polyp versus mucous retention cyst in the left maxillary sinus.   All CT scans at this facility use dose modulation, iterative reconstruction, and/or weight base dosing when appropriate to reduce radiation dose when appropriate to reduce radiation dose to as low as reasonably achievable.         Electronically signed by: Zack King MD   Date:    07/08/2025   Time:    08:14          Inpatient Medications:    Scheduled Meds:   atorvastatin  20 mg Oral Daily    carvediloL  6.25 mg Oral BID WM    chlorhexidine  10 mL Mouth/Throat BID    flecainide  50 mg Oral Q12H    insulin glargine U-100  13 Units Subcutaneous QHS       PRN Meds:  Current Facility-Administered Medications:     0.9%  NaCl infusion (for blood administration), , Intravenous, Q24H PRN    0.9%  NaCl infusion (for blood administration), , Intravenous, Q24H PRN    0.9%  NaCl infusion (for blood administration), , Intravenous, Q24H PRN    acetaminophen, 650 mg, Oral, Q4H PRN    aluminum-magnesium hydroxide-simethicone, 30 mL,  Oral, QID PRN    dextrose 50%, 12.5 g, Intravenous, PRN    dextrose 50%, 25 g, Intravenous, PRN    glucagon (human recombinant), 1 mg, Intramuscular, PRN    glucose, 16 g, Oral, PRN    glucose, 24 g, Oral, PRN    hydrALAZINE, 10 mg, Intravenous, Q6H PRN    HYDROcodone-acetaminophen, 1 tablet, Oral, Q4H PRN    insulin aspart U-100, 0-10 Units, Subcutaneous, QID (AC + HS) PRN    melatonin, 6 mg, Oral, Nightly PRN    morphine, 4 mg, Intravenous, Q4H PRN    naloxone, 0.02 mg, Intravenous, PRN    ondansetron, 4 mg, Intravenous, Q12H PRN    polyethylene glycol, 17 g, Oral, BID PRN    prochlorperazine, 5 mg, Intravenous, Q6H PRN    promethazine, 25 mg, Rectal, Q6H PRN    sodium chloride 0.9%, 10 mL, Intravenous, Q12H PRN            Assessment & Plan  Closed nondisplaced subtrochanteric fracture of left femur  Patient had a fall at home PTA, xray left femur: There is an acute appearing fracture in the intertrochanteric portion of the left femur.     Status post intramedullary maria t insertion per ortho  Physical/occupational therapy when able  Hb/hct drop   Hold ac   Type and screen   Monitor vitals     Liver cirrhosis  MELD-Na score calculated; MELD 3.0: 8 at 7/10/2025  5:13 AM  MELD-Na: 8 at 7/10/2025  5:13 AM  Calculated from:  Serum Creatinine: 0.7 mg/dL (Using min of 1 mg/dL) at 7/10/2025  5:13 AM  Serum Sodium: 137 mmol/L at 7/10/2025  5:13 AM  Total Bilirubin: 1.2 mg/dL at 7/10/2025  5:13 AM  Serum Albumin: 3.1 g/dL at 7/10/2025  5:13 AM  INR(ratio): 1.1 at 7/8/2025  7:39 AM  Age at listing (hypothetical): 60 years  Sex: Male at 7/10/2025  5:13 AM      Continue chronic meds. Etiology likely ETOH. Will avoid any hepatotoxic meds, and monitor CBC/CMP/INR for synthetic function.   PAF (paroxysmal atrial fibrillation)  Patient has paroxysmal (<7 days) atrial fibrillation. Patient is currently in sinus rhythm. [id number] Score: 1. The patients heart rate in the last 24 hours is as follows:  Pulse  Min: 70  Max: 97      Antiarrhythmics  flecainide tablet 50 mg, Every 12 hours, Oral    Anticoagulants       Plan  - Replete lytes with a goal of K>4, Mg >2  - Patient is not anticoagulated due to Eliquis on hold for surgery  - Patient's afib is currently controlled  Continue holding a/c due to drop in hb/hct postoperatively       Primary hypertension  Patient's blood pressure range in the last 24 hours was: BP  Min: 119/56  Max: 166/74.The patient's inpatient anti-hypertensive regimen is listed below:  Current Antihypertensives  carvediloL tablet 6.25 mg, 2 times daily with meals, Oral  hydrALAZINE injection 10 mg, Every 6 hours PRN, Intravenous    Plan  - BP is controlled, no changes needed to their regimen    EVELYN on CPAP  --CPAP @ HS  Recommended patient bring home unit  At risk for hypercapnia     Insulin dependent type 2 diabetes mellitus  Patient's FSGs are controlled on current medication regimen.  Last A1c reviewed-   Lab Results   Component Value Date    HGBA1C 9.9 (H) 07/08/2025     Most recent fingerstick glucose reviewed-   Recent Labs   Lab 07/12/25  0553 07/12/25  1128 07/12/25  1634 07/12/25  2049   POCTGLUCOSE 271* 334* 287* 287*     Current correctional scale  Medium  Maintain anti-hyperglycemic dose as follows-   Antihyperglycemics (From admission, onward)      Start     Stop Route Frequency Ordered    07/11/25 2100  insulin glargine U-100 (Lantus) pen 13 Units         -- SubQ Nightly 07/08/25 1646    07/10/25 1722  insulin aspart U-100 pen 0-10 Units         -- SubQ Before meals & nightly PRN 07/10/25 1622          Hold Oral hypoglycemics while patient is in the hospital.  Diabetic education  Moderate dose sliding scale to promote wound healing    VTE Risk Mitigation (From admission, onward)           Ordered     IP VTE HIGH RISK PATIENT  Once         07/08/25 1052     Place sequential compression device  Until discontinued         07/08/25 1052     Reason for No Pharmacological VTE Prophylaxis  Once         Question:  Reasons:  Answer:  IV Heparin w/in 24 hrs. Pre or Post-Op    07/08/25 1052                    Discharge Planning   BRANDIE: 7/12/2025     Code Status: Full Code   Medical Readiness for Discharge Date:   Discharge Plan A: Home with family              Jon Portillo DO  Department of Hospital Medicine   [facility] ([facility])    Voice recognition software was used in the creation of this note/communication and any sound-alike/typographical errors which may have occurred despite initial review prior to signing should be taken in context when interpreting.  If such errors prevent a clear understanding of the note/communication, please contact the provider/office for clarification.

## 2025-07-13 NOTE — PROGRESS NOTES
Postop day 3    Hemoglobin 8.1    Neurovascular exam of the left lower extremities intact    Wound output has significantly decreased and pain is improved    He is concerned about the convalescent time surrounding this but I did tell him he will be about 2 months of touchdown weight-bearing    We talked about the possibility of ECF    We will see how he does with physical therapy

## 2025-07-13 NOTE — ASSESSMENT & PLAN NOTE
Patient's blood pressure range in the last 24 hours was: BP  Min: 119/56  Max: 166/74.The patient's inpatient anti-hypertensive regimen is listed below:  Current Antihypertensives  carvediloL tablet 6.25 mg, 2 times daily with meals, Oral  hydrALAZINE injection 10 mg, Every 6 hours PRN, Intravenous    Plan  - BP is controlled, no changes needed to their regimen

## 2025-07-13 NOTE — ASSESSMENT & PLAN NOTE
Patient's FSGs are controlled on current medication regimen.  Last A1c reviewed-   Lab Results   Component Value Date    HGBA1C 9.9 (H) 07/08/2025     Most recent fingerstick glucose reviewed-   Recent Labs   Lab 07/12/25  0553 07/12/25  1128 07/12/25  1634 07/12/25  2049   POCTGLUCOSE 271* 334* 287* 287*     Current correctional scale  Medium  Maintain anti-hyperglycemic dose as follows-   Antihyperglycemics (From admission, onward)      Start     Stop Route Frequency Ordered    07/11/25 2100  insulin glargine U-100 (Lantus) pen 13 Units         -- SubQ Nightly 07/08/25 1646    07/10/25 1722  insulin aspart U-100 pen 0-10 Units         -- SubQ Before meals & nightly PRN 07/10/25 1622          Hold Oral hypoglycemics while patient is in the hospital.  Diabetic education  Moderate dose sliding scale to promote wound healing

## 2025-07-13 NOTE — PLAN OF CARE
Initial PT eval completed. Patient Max A with bed mobility and Mod A x2 with stand, pivot transfer bed>chair with bed elevated and use of RW. Recommend high intensity therapy.

## 2025-07-13 NOTE — PT/OT/SLP PROGRESS
Physical Therapy  Treatment    Roland Lopez   MRN: [id number]   Admitting Diagnosis: Closed nondisplaced subtrochanteric fracture of left femur    PT Received On: 07/13/25  PT Start Time: 1300     PT Stop Time: 1312    PT Total Time (min): 12 min       Billable Minutes:  Therapeutic Activity 12    Treatment Type: Treatment  PT/PTA: PT     Number of PTA visits since last PT visit: 0       General Precautions: Standard, fall  Orthopedic Precautions: LLE toe touch weight bearing  Braces: N/A  Respiratory Status: Room air    Spiritual, Cultural Beliefs, Druze Practices, Values that Affect Care: no    Subjective:  Communicated with RN prior to session.  Patient ready to return to bed after being up for a few hours.     Pain/Comfort  Pain Rating 1: 5/10  Location - Side 1: Left  Location - Orientation 1: generalized  Location 1: hip  Pain Addressed 1: Cessation of Activity  Pain Rating Post-Intervention 1: 5/10    Objective:   Patient found with: telemetry, peripheral IV    Functional Mobility:  Bed Mobility:   Sit>sup: Max A    Transfers:  Sit>stand from low chair: Max A x2 with RW  Pivot transfer chair>bed: Mod A x2 with RW    Gait:   NT: Not safe to attempt ambulation at this time    Balance:   Static Sit: GOOD+: Takes MAXIMAL challenges from all directions.    Dynamic Sit: GOOD: Maintains balance through MODERATE excursions of active trunk movement  Static Stand: FAIR: Maintains without assist but unable to take challenges  Dynamic stand: POOR: Needs MOD (moderate) assist during gait       Treatment and Education:    Patient reminded of WB restrictions with returning to bed. Patient able to maintain LLE TTWB with stand pivot transfer bed>chair.      AM-PAC 6 CLICK MOBILITY  How much help from another person does this patient currently need?   1 = Unable, Total/Dependent Assistance  2 = A lot, Maximum/Moderate Assistance  3 = A little, Minimum/Contact Guard/Supervision  4 = None, Modified  [place]/Independent    Turning over in bed (including adjusting bedclothes, sheets and blankets)?: 1 (NT)  Sitting down on and standing up from a chair with arms (e.g., wheelchair, bedside commode, etc.): 2  Moving from lying on back to sitting on the side of the bed?: 1 (NT)  Moving to and from a bed to a chair (including a wheelchair)?: 2  Need to walk in hospital room?: 2  Climbing 3-5 steps with a railing?: 1 (NT)  Basic Mobility Total Score: 9    AM-PAC Raw Score CMS G-Code Modifier Level of Impairment Assistance   6 % Total / Unable   7 - 9 CM 80 - 100% Maximal Assist   10 - 14 CL 60 - 80% Moderate Assist   15 - 19 CK 40 - 60% Moderate Assist   20 - 22 CJ 20 - 40% Minimal Assist   23 CI 1-20% SBA / CGA   24 CH 0% Independent/ Mod I     Patient left supine with all lines intact and call button in reach.    Assessment:  Roland Lopez is a 60 y.o. male with a medical diagnosis of Closed nondisplaced subtrochanteric fracture of left femur.    Rehab identified problem list/impairments: weakness, impaired balance, decreased safety awareness, impaired endurance, impaired self care skills, decreased coordination, decreased ROM, impaired functional mobility, decreased upper extremity function, gait instability, decreased lower extremity function, orthopedic precautions    Rehab potential is excellent.    Activity tolerance: Good    Discharge recommendations: High Intensity Therapy      Barriers to discharge:      Equipment recommendations: to be determined by next level of care     GOALS:   Multidisciplinary Problems       Physical Therapy Goals          Problem: Physical Therapy    Goal Priority Disciplines Outcome Interventions   Physical Therapy Goal     PT, PT/OT Progressing    Description: Patient will be seen a minimal of 3 out of 7 days a week.    LTG to be met by 07/27/25:     Bed mobility: Min A  Transfers: Min A with RW  Gait: Min A with RW x20 feet                        DME  Justifications:  No DME recommended requiring DME justifications    PLAN:    Patient to be seen 3 x/week to address the above listed problems via therapeutic exercises, therapeutic activities, gait training  Plan of Care expires: 07/27/25  Plan of Care reviewed with: patient, father         07/13/2025

## 2025-07-14 PROBLEM — D64.9 ANEMIA: Status: ACTIVE | Noted: 2025-07-14

## 2025-07-14 PROBLEM — D62 ACUTE BLOOD LOSS ANEMIA: Status: ACTIVE | Noted: 2025-07-14

## 2025-07-14 LAB
ABO + RH BLD: NORMAL
ABSOLUTE EOSINOPHIL (OHS): 0.13 K/UL
ABSOLUTE MONOCYTE (OHS): 0.58 K/UL (ref 0.3–1)
ABSOLUTE NEUTROPHIL COUNT (OHS): 4 K/UL (ref 1.8–7.7)
ALBUMIN SERPL BCP-MCNC: 2.2 G/DL (ref 3.5–5.2)
ALP SERPL-CCNC: 59 UNIT/L (ref 40–150)
ALT SERPL W/O P-5'-P-CCNC: 31 UNIT/L (ref 10–44)
ANION GAP (OHS): 6 MMOL/L (ref 8–16)
AST SERPL-CCNC: 33 UNIT/L (ref 11–45)
BASOPHILS # BLD AUTO: 0.02 K/UL
BASOPHILS NFR BLD AUTO: 0.3 %
BILIRUB SERPL-MCNC: 1.1 MG/DL (ref 0.1–1)
BLD PROD TYP BPU: NORMAL
BLOOD UNIT EXPIRATION DATE: NORMAL
BLOOD UNIT TYPE CODE: 6200
BUN SERPL-MCNC: 23 MG/DL (ref 6–20)
CALCIUM SERPL-MCNC: 8.5 MG/DL (ref 8.7–10.5)
CHLORIDE SERPL-SCNC: 106 MMOL/L (ref 95–110)
CO2 SERPL-SCNC: 24 MMOL/L (ref 23–29)
CREAT SERPL-MCNC: 0.7 MG/DL (ref 0.5–1.4)
CROSSMATCH INTERPRETATION: NORMAL
DISPENSE STATUS: NORMAL
ERYTHROCYTE [DISTWIDTH] IN BLOOD BY AUTOMATED COUNT: 14.4 % (ref 11.5–14.5)
GFR SERPLBLD CREATININE-BSD FMLA CKD-EPI: >60 ML/MIN/1.73/M2
GLUCOSE SERPL-MCNC: 220 MG/DL (ref 70–110)
HCT VFR BLD AUTO: 21.6 % (ref 40–54)
HGB BLD-MCNC: 7 GM/DL (ref 14–18)
IMM GRANULOCYTES # BLD AUTO: 0.04 K/UL (ref 0–0.04)
IMM GRANULOCYTES NFR BLD AUTO: 0.7 % (ref 0–0.5)
IRON SATN MFR SERPL: 10 % (ref 20–50)
IRON SERPL-MCNC: 18 UG/DL (ref 45–160)
LYMPHOCYTES # BLD AUTO: 0.99 K/UL (ref 1–4.8)
MAGNESIUM SERPL-MCNC: 2 MG/DL (ref 1.6–2.6)
MCH RBC QN AUTO: 28.8 PG (ref 27–31)
MCHC RBC AUTO-ENTMCNC: 32.4 G/DL (ref 32–36)
MCV RBC AUTO: 89 FL (ref 82–98)
NUCLEATED RBC (/100WBC) (OHS): 0 /100 WBC
PHOSPHATE SERPL-MCNC: 2.7 MG/DL (ref 2.7–4.5)
PLATELET # BLD AUTO: 176 K/UL (ref 150–450)
PMV BLD AUTO: 9.9 FL (ref 9.2–12.9)
POCT GLUCOSE: 220 MG/DL (ref 70–110)
POCT GLUCOSE: 230 MG/DL (ref 70–110)
POCT GLUCOSE: 242 MG/DL (ref 70–110)
POCT GLUCOSE: 343 MG/DL (ref 70–110)
POTASSIUM SERPL-SCNC: 3.7 MMOL/L (ref 3.5–5.1)
PROT SERPL-MCNC: 5.6 GM/DL (ref 6–8.4)
RBC # BLD AUTO: 2.43 M/UL (ref 4.6–6.2)
RELATIVE EOSINOPHIL (OHS): 2.3 %
RELATIVE LYMPHOCYTE (OHS): 17.2 % (ref 18–48)
RELATIVE MONOCYTE (OHS): 10.1 % (ref 4–15)
RELATIVE NEUTROPHIL (OHS): 69.4 % (ref 38–73)
SODIUM SERPL-SCNC: 136 MMOL/L (ref 136–145)
TIBC SERPL-MCNC: 186 UG/DL (ref 250–450)
TRANSFERRIN SERPL-MCNC: 126 MG/DL (ref 200–375)
UNIT NUMBER: NORMAL
WBC # BLD AUTO: 5.76 K/UL (ref 3.9–12.7)

## 2025-07-14 PROCEDURE — 94761 N-INVAS EAR/PLS OXIMETRY MLT: CPT

## 2025-07-14 PROCEDURE — 63600175 PHARM REV CODE 636 W HCPCS: Performed by: FAMILY MEDICINE

## 2025-07-14 PROCEDURE — 25000003 PHARM REV CODE 250: Performed by: NURSE PRACTITIONER

## 2025-07-14 PROCEDURE — 99900035 HC TECH TIME PER 15 MIN (STAT)

## 2025-07-14 PROCEDURE — 99024 POSTOP FOLLOW-UP VISIT: CPT | Mod: ,,, | Performed by: PHYSICIAN ASSISTANT

## 2025-07-14 PROCEDURE — 25000003 PHARM REV CODE 250: Performed by: STUDENT IN AN ORGANIZED HEALTH CARE EDUCATION/TRAINING PROGRAM

## 2025-07-14 PROCEDURE — 97110 THERAPEUTIC EXERCISES: CPT

## 2025-07-14 PROCEDURE — 97530 THERAPEUTIC ACTIVITIES: CPT

## 2025-07-14 PROCEDURE — 97530 THERAPEUTIC ACTIVITIES: CPT | Mod: CQ

## 2025-07-14 PROCEDURE — 25000003 PHARM REV CODE 250: Performed by: ORTHOPAEDIC SURGERY

## 2025-07-14 PROCEDURE — 86920 COMPATIBILITY TEST SPIN: CPT | Performed by: NURSE PRACTITIONER

## 2025-07-14 PROCEDURE — 83540 ASSAY OF IRON: CPT | Performed by: INTERNAL MEDICINE

## 2025-07-14 PROCEDURE — 94660 CPAP INITIATION&MGMT: CPT

## 2025-07-14 PROCEDURE — 36415 COLL VENOUS BLD VENIPUNCTURE: CPT | Performed by: STUDENT IN AN ORGANIZED HEALTH CARE EDUCATION/TRAINING PROGRAM

## 2025-07-14 PROCEDURE — 85025 COMPLETE CBC W/AUTO DIFF WBC: CPT | Performed by: STUDENT IN AN ORGANIZED HEALTH CARE EDUCATION/TRAINING PROGRAM

## 2025-07-14 PROCEDURE — 27100171 HC OXYGEN HIGH FLOW UP TO 24 HOURS

## 2025-07-14 PROCEDURE — 21400001 HC TELEMETRY ROOM

## 2025-07-14 PROCEDURE — 63600175 PHARM REV CODE 636 W HCPCS: Performed by: NURSE PRACTITIONER

## 2025-07-14 PROCEDURE — 84100 ASSAY OF PHOSPHORUS: CPT | Performed by: STUDENT IN AN ORGANIZED HEALTH CARE EDUCATION/TRAINING PROGRAM

## 2025-07-14 PROCEDURE — 36430 TRANSFUSION BLD/BLD COMPNT: CPT

## 2025-07-14 PROCEDURE — 83735 ASSAY OF MAGNESIUM: CPT | Performed by: STUDENT IN AN ORGANIZED HEALTH CARE EDUCATION/TRAINING PROGRAM

## 2025-07-14 PROCEDURE — 80053 COMPREHEN METABOLIC PANEL: CPT | Performed by: STUDENT IN AN ORGANIZED HEALTH CARE EDUCATION/TRAINING PROGRAM

## 2025-07-14 PROCEDURE — P9016 RBC LEUKOCYTES REDUCED: HCPCS | Performed by: NURSE PRACTITIONER

## 2025-07-14 RX ORDER — LANOLIN ALCOHOL/MO/W.PET/CERES
1 CREAM (GRAM) TOPICAL DAILY
Status: DISCONTINUED | OUTPATIENT
Start: 2025-07-14 | End: 2025-07-16 | Stop reason: HOSPADM

## 2025-07-14 RX ORDER — INSULIN GLARGINE 100 [IU]/ML
10 INJECTION, SOLUTION SUBCUTANEOUS DAILY
Status: DISCONTINUED | OUTPATIENT
Start: 2025-07-14 | End: 2025-07-16

## 2025-07-14 RX ORDER — HYDROCODONE BITARTRATE AND ACETAMINOPHEN 500; 5 MG/1; MG/1
TABLET ORAL
Status: DISCONTINUED | OUTPATIENT
Start: 2025-07-14 | End: 2025-07-16 | Stop reason: HOSPADM

## 2025-07-14 RX ADMIN — HYDROCODONE BITARTRATE AND ACETAMINOPHEN 1 TABLET: 5; 325 TABLET ORAL at 10:07

## 2025-07-14 RX ADMIN — CHLORHEXIDINE GLUCONATE 0.12% ORAL RINSE 10 ML: 1.2 LIQUID ORAL at 08:07

## 2025-07-14 RX ADMIN — INSULIN ASPART 4 UNITS: 100 INJECTION, SOLUTION INTRAVENOUS; SUBCUTANEOUS at 06:07

## 2025-07-14 RX ADMIN — ATORVASTATIN CALCIUM 20 MG: 10 TABLET, FILM COATED ORAL at 09:07

## 2025-07-14 RX ADMIN — ACETAMINOPHEN 650 MG: 325 TABLET ORAL at 11:07

## 2025-07-14 RX ADMIN — INSULIN GLARGINE 10 UNITS: 100 INJECTION, SOLUTION SUBCUTANEOUS at 12:07

## 2025-07-14 RX ADMIN — FERROUS SULFATE TAB 325 MG (65 MG ELEMENTAL FE) 1 EACH: 325 (65 FE) TAB at 12:07

## 2025-07-14 RX ADMIN — CHLORHEXIDINE GLUCONATE 0.12% ORAL RINSE 10 ML: 1.2 LIQUID ORAL at 09:07

## 2025-07-14 RX ADMIN — CARVEDILOL 6.25 MG: 6.25 TABLET, FILM COATED ORAL at 05:07

## 2025-07-14 RX ADMIN — HYDROCODONE BITARTRATE AND ACETAMINOPHEN 1 TABLET: 5; 325 TABLET ORAL at 08:07

## 2025-07-14 RX ADMIN — FLECAINIDE ACETATE 50 MG: 50 TABLET ORAL at 09:07

## 2025-07-14 RX ADMIN — INSULIN ASPART 4 UNITS: 100 INJECTION, SOLUTION INTRAVENOUS; SUBCUTANEOUS at 05:07

## 2025-07-14 RX ADMIN — INSULIN ASPART 4 UNITS: 100 INJECTION, SOLUTION INTRAVENOUS; SUBCUTANEOUS at 12:07

## 2025-07-14 RX ADMIN — INSULIN GLARGINE 13 UNITS: 100 INJECTION, SOLUTION SUBCUTANEOUS at 08:07

## 2025-07-14 RX ADMIN — CARVEDILOL 6.25 MG: 6.25 TABLET, FILM COATED ORAL at 09:07

## 2025-07-14 RX ADMIN — APIXABAN 5 MG: 2.5 TABLET, FILM COATED ORAL at 09:07

## 2025-07-14 RX ADMIN — FLECAINIDE ACETATE 50 MG: 50 TABLET ORAL at 08:07

## 2025-07-14 NOTE — NURSING
Patient name & MRN  Roland Lopez [id number]    Labs:    Lab Results   Component Value Date    HGBA1C 9.9 (H) 07/08/2025       Recent Labs     07/11/25 2014 07/12/25  0553 07/12/25  1128 07/12/25  1634 07/12/25 2049 07/13/25  0625 07/13/25  1136 07/13/25  1707 07/13/25 2011 07/14/25  0637   POCTGLUCOSE 232* 271* 334* 287* 287* 257* 297* 265* 271* 220*       eGFR   Date Value Ref Range Status   07/14/2025 >60 >60 mL/min/1.73/m2 Final     Comment:     Estimated GFR calculated using the CKD-EPI creatinine (2021) equation.   10/10/2024 >60 >60 mL/min/1.73 m^2 Final     Insulin regimen:     Home Insulin Doses:Lantus 26U    Hospital Insulin Doses: Glargine (Lantus) and Moderate correctional scale    Physician Notification:    Physician Notified? : Yes    Suggested Recommendations Based on [facility] Standard of Practice Insulin Dosing Guidelines: Pt could benefit from having Lantus increased to home dose of Lantus: 26U

## 2025-07-14 NOTE — PROGRESS NOTES
[facility])  Orthopedics  Progress Note    Patient Name: Roland Lopez  MRN: [id number]  Admission Date: 7/8/2025  Hospital Length of Stay: 6 days  Attending Provider: Melanie Torres MD  Primary Care Provider: Emilee Primary Doctor  Follow-up For: Procedure(s) (LRB):  INSERTION, INTRAMEDULLARY COOKIE, FEMUR, TROCHANTER (Left)    Post-Operative Day: 4 Days Post-Op  Subjective:     Principal Problem:Closed nondisplaced subtrochanteric fracture of left femur    Principal Orthopedic Problem:  Left subtrochanteric femur fracture    Interval History: Roland Lopez is a 60 y.o. male postop day 4 status post ORIF of left subtrochanteric femur fracture.  Patient is sitting on the edge of the bed.  Denies numbness or tingling in the extremity.  No new complaints at this time.    Review of patient's allergies indicates:   Allergen Reactions    1 plus 1-f Rash    Sulfa (sulfonamide antibiotics) Rash       Current Facility-Administered Medications   Medication    0.9%  NaCl infusion (for blood administration)    0.9%  NaCl infusion (for blood administration)    0.9%  NaCl infusion (for blood administration)    acetaminophen tablet 650 mg    aluminum-magnesium hydroxide-simethicone 200-200-20 mg/5 mL suspension 30 mL    apixaban tablet 5 mg    atorvastatin tablet 20 mg    carvediloL tablet 6.25 mg    chlorhexidine 0.12 % solution 10 mL    dextrose 50% injection 12.5 g    dextrose 50% injection 25 g    flecainide tablet 50 mg    glucagon (human recombinant) injection 1 mg    glucose chewable tablet 16 g    glucose chewable tablet 24 g    hydrALAZINE injection 10 mg    HYDROcodone-acetaminophen 5-325 mg per tablet 1 tablet    insulin aspart U-100 pen 0-10 Units    insulin glargine U-100 (Lantus) pen 13 Units    melatonin tablet 6 mg    morphine injection 4 mg    naloxone 0.4 mg/mL injection 0.02 mg    ondansetron injection 4 mg    polyethylene glycol packet 17 g    prochlorperazine injection Soln 5 mg     "promethazine suppository 25 mg    sodium chloride 0.9% flush 10 mL     Objective:     Vital Signs (Most Recent):  Temp: 97.5 °F (36.4 °C) (07/14/25 0815)  Pulse: 86 (07/14/25 0815)  Resp: 18 (07/14/25 0815)  BP: (!) 151/76 (07/14/25 0815)  SpO2: 100 % (07/14/25 0815) Vital Signs (24h Range):  Temp:  [97.5 °F (36.4 °C)-98.7 °F (37.1 °C)] 97.5 °F (36.4 °C)  Pulse:  [67-86] 86  Resp:  [16-23] 18  SpO2:  [94 %-100 %] 100 %  BP: (130-151)/(61-79) 151/76     Weight:  (Weight not measured, foot of bed is detatched.)  Height: 6' 2" (188 cm)  Body mass index is 42.94 kg/m².      Intake/Output Summary (Last 24 hours) at 7/14/2025 1009  Last data filed at 7/14/2025 0718  Gross per 24 hour   Intake 240 ml   Output 2100 ml   Net -1860 ml       Ortho/SPM Exam  Left lower extremity:   Dressings are in place with mild amount of serosanguineous drainage   No evidence of infection   Moderate edema of the thigh   Moderate TTP around the incision   Calf and compartments are soft and compressible   Motor exam normal   Sensation and pulses intact   Cap refill brisk    GEN: Well developed, well nourished male. AAOX3. No acute distress.   Head: Normocephalic, atraumatic.   Eyes: CHEY  Neck: Trachea is midline, no adenopathy  Resp: Breathing unlabored.  Neuro: Motor function normal, Cranial nerves intact  Psych: Mood and affect appropriate.      Significant Labs:   Recent Lab Results  (Last 5 results in the past 24 hours)        07/14/25  0637   07/14/25  0450   07/13/25 2011 07/13/25  1707   07/13/25  1136        Albumin   2.2             ALP   59             ALT   31             Anion Gap   6             AST   33             Baso #   0.02             Basophil %   0.3             BILIRUBIN TOTAL   1.1  Comment: For infants and newborns, interpretation of results should be based   on gestational age, weight and in agreement with clinical   observations.    Premature Infant recommended reference ranges:   0-24 hours:  <8.0 mg/dL   24-48 " hours: <12.0 mg/dL   3-5 days:    <15.0 mg/dL   6-29 days:   <15.0 mg/dL             BUN   23             Calcium   8.5             Chloride   106             CO2   24             Creatinine   0.7             eGFR   >60  Comment: Estimated GFR calculated using the CKD-EPI creatinine (2021) equation.             Eos #   0.13             Eos %   2.3             Glucose   220             Gran # (ANC)   4.00             Hematocrit   21.6             Hemoglobin   7.0             Immature Grans (Abs)   0.04  Comment: Mild elevation in immature granulocytes is non specific and can be seen in a variety of conditions including stress response, acute inflammation, trauma and pregnancy. Correlation with other laboratory and clinical findings is essential.             Immature Granulocytes   0.7             Lymph #   0.99             Lymph %   17.2             Magnesium    2.0             MCH   28.8             MCHC   32.4             MCV   89             Mono #   0.58             Mono %   10.1             MPV   9.9             Neut %   69.4             nRBC   0             Phosphorus Level   2.7             Platelet Count   176             POCT Glucose 220     271   265   297       Potassium   3.7             PROTEIN TOTAL   5.6             RBC   2.43             RDW   14.4             Sodium   136             WBC   5.76                                  All pertinent labs within the past 24 hours have been reviewed.    Significant Imaging: I have reviewed and interpreted all pertinent imaging results/findings.    Assessment/Plan:     Active Diagnoses:    Diagnosis Date Noted POA    PRINCIPAL PROBLEM:  Closed nondisplaced subtrochanteric fracture of left femur [S72.25XA] 07/08/2025 Yes    Insulin dependent type 2 diabetes mellitus [E11.9, Z79.4] 10/09/2024 Not Applicable    EVELYN on CPAP [G47.33] 10/09/2024 Yes    Primary hypertension [I10] 10/09/2024 Yes    PAF (paroxysmal atrial fibrillation) [I48.0] 10/09/2024 Yes    Liver  cirrhosis [K74.60] 10/09/2024 Yes      Problems Resolved During this Admission:       Assessment:  60 y.o. male postop day 4 status post ORIF of left subtrochanteric femur fracture    Plan:  Toe-touch weight-bearing to the left lower extremity   PT/OT for gait training and ADLs   DVT prophylaxis per primary team   Patient would like to go to inpatient rehab/skilled nursing facility following hospital discharge   He is ready for discharge from orthopedic standpoint once these arrangements have been made   We will see him back in [facility] around 2 weeks postop    Shiraz Judd PA-C  Orthopedics  [facility] - Telemetry ([facility])

## 2025-07-14 NOTE — ASSESSMENT & PLAN NOTE
Patient has paroxysmal (<7 days) atrial fibrillation. Patient is currently in sinus rhythm. [id number] Score: 1. The patients heart rate in the last 24 hours is as follows:  Pulse  Min: 67  Max: 86     Antiarrhythmics  flecainide tablet 50 mg, Every 12 hours, Oral    Anticoagulants  apixaban tablet 2.5 mg, 2 times daily, Oral    Plan  - Replete lytes with a goal of K>4, Mg >2  - 07/13: Hemoglobin levels remained stable, cautiously resuming home anticoagulation(Eliquis) while trending hemoglobin given concerns for persistent anemia postoperatively requiring multiple blood transfusions.   7/14: H/H trending down post procedure - Eliquis dose decreased   - Patient's afib is currently controlled

## 2025-07-14 NOTE — SUBJECTIVE & OBJECTIVE
Interval History: No acute events overnight, afebrile, hemodynamically stable. Hemoglobin levels remained stable, started on DVT prophylaxis.  Patient amenable to rehab placement given his debility concerns.  Objective:     Vital Signs (Most Recent):  Temp: 98.7 °F (37.1 °C) (07/13/25 2040)  Pulse: 70 (07/13/25 2132)  Resp: 16 (07/13/25 2132)  BP: 133/79 (07/13/25 2040)  SpO2: 98 % (07/13/25 2132) Vital Signs (24h Range):  Temp:  [97.6 °F (36.4 °C)-98.7 °F (37.1 °C)] 98.7 °F (37.1 °C)  Pulse:  [65-82] 70  Resp:  [16-22] 16  SpO2:  [95 %-100 %] 98 %  BP: (130-155)/(61-79) 133/79     Weight: (!) 151.7 kg (334 lb 7 oz)  Body mass index is 42.94 kg/m².    Intake/Output Summary (Last 24 hours) at 7/13/2025 2232  Last data filed at 7/13/2025 1917  Gross per 24 hour   Intake 240 ml   Output 1650 ml   Net -1410 ml         Physical Exam  Vitals and nursing note reviewed.   Constitutional:       General: He is not in acute distress.     Appearance: He is morbidly obese. He is ill-appearing. He is not toxic-appearing.   HENT:      Head: Normocephalic and atraumatic.   Eyes:      General: No scleral icterus.        Right eye: No discharge.         Left eye: No discharge.   Cardiovascular:      Rate and Rhythm: Normal rate.   Pulmonary:      Effort: Pulmonary effort is normal. No respiratory distress.   Abdominal:      Palpations: Abdomen is soft.      Tenderness: There is no abdominal tenderness.   Musculoskeletal:        Legs:    Skin:     General: Skin is warm.      Coloration: Skin is pale. Skin is not jaundiced.   Neurological:      Mental Status: He is alert and easily aroused.      Motor: Weakness present.               Significant Labs: All pertinent labs within the past 24 hours have been reviewed.   Recent Labs   Lab 07/11/25  0512 07/12/25  0921 07/13/25  0937   * 133* 139   K 4.2 4.0 3.5    102 106   CO2 21* 24 24   BUN 39* 47* 30*   CREATININE 2.7* 1.4 0.8   * 279* 218*   ANIONGAP 10 7* 9      Recent Labs   Lab 07/11/25  0512 07/12/25  0921 07/13/25  0937   MG 1.8 2.0 2.2   PHOS  --  2.7 1.9*     Recent Labs   Lab 07/11/25  0512 07/12/25  0921 07/13/25  0937   AST 40 41 42   ALT 24 22 29   ALKPHOS 46 50 59   BILITOT 0.6 1.0 1.3*   ALBUMIN 2.8* 2.4* 2.6*     POCT Glucose:   Recent Labs   Lab 07/13/25  1136 07/13/25  1707 07/13/25 2011   POCTGLUCOSE 297* 265* 271*    Recent Labs   Lab 07/12/25  0714 07/12/25  2344 07/13/25  0937   WBC 9.69 7.67 9.92   HGB 6.6* 6.9* 8.1*   HCT 20.3* 20.2* 24.6*    149* 188                         Significant Imaging:  I have reviewed all pertinent imaging results/findings within the past 24 hours.   X-Ray Femur 2 View Left   Final Result      Satisfactory postoperative study.         Electronically signed by: Darion Ricci MD   Date:    07/10/2025   Time:    11:50      SURG FL Surgery Fluoro Usage   Final Result      X-Ray Femur 2 View Left   Final Result      See above.         Electronically signed by: Kadeem Taylor MD   Date:    07/10/2025   Time:    10:08      X-Ray Femur Ap/Lat Left   Final Result      There is an acute appearing fracture in the trochanteric portion of the left femur.         Electronically signed by: Zack King MD   Date:    07/08/2025   Time:    08:42      X-Ray Pelvis Routine AP   Final Result      There is an acute appearing fracture in the intertrochanteric portion of the left femur.         Electronically signed by: Zack King MD   Date:    07/08/2025   Time:    08:41      X-Ray Chest AP Portable   Final Result      1. There is no focal pulmonary infiltrate visualized.   2. There is moderate cardiomegaly.   .         Electronically signed by: Zack King MD   Date:    07/08/2025   Time:    08:39      CT Head Without Contrast   Final Result      1. There is no calvarial fracture or intracranial hemorrhage.   2. Right maxillary sinusitis   3. There is a 13 mm polyp versus mucous retention cyst in the left maxillary  sinus.   All CT scans at this facility use dose modulation, iterative reconstruction, and/or weight base dosing when appropriate to reduce radiation dose when appropriate to reduce radiation dose to as low as reasonably achievable.         Electronically signed by: Zack King MD   Date:    07/08/2025   Time:    08:14          Inpatient Medications:    Scheduled Meds:   atorvastatin  20 mg Oral Daily    carvediloL  6.25 mg Oral BID WM    chlorhexidine  10 mL Mouth/Throat BID    enoxparin  40 mg Subcutaneous BID    flecainide  50 mg Oral Q12H    insulin glargine U-100  13 Units Subcutaneous QHS       PRN Meds:  Current Facility-Administered Medications:     0.9%  NaCl infusion (for blood administration), , Intravenous, Q24H PRN    0.9%  NaCl infusion (for blood administration), , Intravenous, Q24H PRN    0.9%  NaCl infusion (for blood administration), , Intravenous, Q24H PRN    acetaminophen, 650 mg, Oral, Q4H PRN    aluminum-magnesium hydroxide-simethicone, 30 mL, Oral, QID PRN    dextrose 50%, 12.5 g, Intravenous, PRN    dextrose 50%, 25 g, Intravenous, PRN    glucagon (human recombinant), 1 mg, Intramuscular, PRN    glucose, 16 g, Oral, PRN    glucose, 24 g, Oral, PRN    hydrALAZINE, 10 mg, Intravenous, Q6H PRN    HYDROcodone-acetaminophen, 1 tablet, Oral, Q4H PRN    insulin aspart U-100, 0-10 Units, Subcutaneous, QID (AC + HS) PRN    melatonin, 6 mg, Oral, Nightly PRN    morphine, 4 mg, Intravenous, Q4H PRN    naloxone, 0.02 mg, Intravenous, PRN    ondansetron, 4 mg, Intravenous, Q12H PRN    polyethylene glycol, 17 g, Oral, BID PRN    prochlorperazine, 5 mg, Intravenous, Q6H PRN    promethazine, 25 mg, Rectal, Q6H PRN    sodium chloride 0.9%, 10 mL, Intravenous, Q12H PRN

## 2025-07-14 NOTE — ASSESSMENT & PLAN NOTE
Patient's FSGs are controlled on current medication regimen.  Last A1c reviewed-   Lab Results   Component Value Date    HGBA1C 9.9 (H) 07/08/2025     Most recent fingerstick glucose reviewed-   Recent Labs   Lab 07/13/25  1707 07/13/25 2011 07/14/25  0637 07/14/25  1102   POCTGLUCOSE 265* 271* 220* 242*     Current correctional scale  Medium  Maintain anti-hyperglycemic dose as follows-   Antihyperglycemics (From admission, onward)      Start     Stop Route Frequency Ordered    07/14/25 1215  insulin glargine U-100 (Lantus) pen 10 Units         -- SubQ Daily 07/14/25 1200    07/11/25 2100  insulin glargine U-100 (Lantus) pen 13 Units         -- SubQ Nightly 07/08/25 1646    07/10/25 1722  insulin aspart U-100 pen 0-10 Units         -- SubQ Before meals & nightly PRN 07/10/25 1622          Hold Oral hypoglycemics while patient is in the hospital.  Diabetic education  Moderate dose sliding scale to promote wound healing  -Lantus dose adjusted to BID   Spoke with mom regarding appointment she scheduled on Dr. Randle schedule for 01/06/20. In the appointment jenn mom wrote, patient having trouble breathing at night. I contacted mom in regards to this issue. She states patient has had several episodes where patiens stops breathing at night. Mom states she will rub his back  to make him start breathing again. Asked mom if she noticed the baby turning a blue color, mom states she did not notice his color as it was dark. Per mom, this has happened several times over the past 2 nights.   Given the fact that mom is not aware if babys coloring was different or if she felt baby was in respiratory distress during these episodes,  I advised her to take the baby to the ER for immediate evaluation. Mom voiced understanding Mom to proceed to [facility] on [address].

## 2025-07-14 NOTE — SUBJECTIVE & OBJECTIVE
Interval History: pt in bed upon exam with no signs of acute distress witnessed or reported and pain controlled on prescribed regime. CM consulted to assist with Rehab placement.     Review of Systems   Constitutional:  Positive for activity change. Negative for appetite change and fatigue.   HENT: Negative.     Respiratory: Negative.     Gastrointestinal: Negative.    Genitourinary: Negative.    Musculoskeletal:  Positive for gait problem and myalgias.   Skin:  Positive for wound.   Psychiatric/Behavioral: Negative.       Objective:     Vital Signs (Most Recent):  Temp: 97.5 °F (36.4 °C) (07/14/25 1211)  Pulse: 73 (07/14/25 1211)  Resp: 18 (07/14/25 1211)  BP: (!) 148/70 (07/14/25 1211)  SpO2: 97 % (07/14/25 1211) Vital Signs (24h Range):  Temp:  [97.5 °F (36.4 °C)-98.7 °F (37.1 °C)] 97.5 °F (36.4 °C)  Pulse:  [67-86] 73  Resp:  [16-23] 18  SpO2:  [94 %-100 %] 97 %  BP: (133-151)/(63-79) 148/70     Weight:  (Weight not measured, foot of bed is detatched.)  Body mass index is 42.94 kg/m².    Intake/Output Summary (Last 24 hours) at 7/14/2025 1217  Last data filed at 7/14/2025 0718  Gross per 24 hour   Intake 240 ml   Output 2100 ml   Net -1860 ml         Physical Exam  Constitutional:       Appearance: He is obese.   HENT:      Mouth/Throat:      Mouth: Mucous membranes are moist.      Pharynx: Oropharynx is clear.   Cardiovascular:      Rate and Rhythm: Normal rate and regular rhythm.      Pulses: Normal pulses.   Pulmonary:      Effort: Pulmonary effort is normal.      Comments: Decreased bibasilar  Abdominal:      General: Bowel sounds are normal.      Palpations: Abdomen is soft.   Musculoskeletal:         General: Swelling (LLE) and tenderness (LLE) present.   Skin:     General: Skin is warm and dry.      Comments: Surgical incision to Left thigh    Neurological:      Mental Status: He is alert and oriented to person, place, and time.   Psychiatric:         Mood and Affect: Mood normal.                Significant Labs: All pertinent labs within the past 24 hours have been reviewed.    Significant Imaging: I have reviewed all pertinent imaging results/findings within the past 24 hours.

## 2025-07-14 NOTE — PROGRESS NOTES
[facility] Medicine  Progress Note    Patient Name: Roland Lopez  MRN: [id number]  Patient Class: IP- Inpatient   Admission Date: 7/8/2025  Length of Stay: 5 days  Attending Physician: Jon Portillo DO  Primary Care Provider: Emilee, Primary Doctor        Subjective     Principal Problem:Closed nondisplaced subtrochanteric fracture of left femur        HPI:  60 y.o. male with a PMHx of HTN, EVELYN, DM, HLD, and paroxysmal A fib who presented to the Emergency Department for evaluation of a fall which occurred this morning. Pt state he was getting ready for work when he slipped on what he thinks was cat urine in his home. Pt tried to use the installed railings in his home but could not catch himself. Symptoms are constant and moderate in severity. No mitigating or exacerbating factors reported. Associated sxs include left leg pain and left hip pain. Patient denies any nausea or LOC, numbness or tingling. No prior Tx specified. Pt reports he takes eliquis. No further complaints or concerns at this time.   ED course: H/H: 13.3/40.4, Plt 157K, glucose 280, PTT: Wnl, PT/INR:WNL. Given 4mg Morphine for pain in ED.     Left Femur xray, Pelvic xray: There is an acute appearing fracture in the trochanteric portion of the left femur.   CXR: no acute findings, CT head: There is no calvarial fracture or intracranial hemorrhage, other findings insignificant  Consulted Orthopedic Surgery, recommended admission for surgery later today.  I note some minor lab abnormalities that have been stable over time, these are of doubtful clinical significance.       Patient is a full code.   After evaluating the patients clinical presentation, vitals, labs, and risk factors, the attending physician and I decided to admit the patient for monitoring, diagnostics, and treatment due to the risk of deterioration if managed outpatient   Admitted to  for management of Left Femur Fracture.     Overview/Hospital  Course:  7/9 admitted for acute left hip fracture status post slip and fall. Ortho consulted and recommended surgical intervention. Due to recent use of ozempic, surgery for today after being npo x 24h+h. Complains of pain. Denies any problems with anesthesia in the past.   7/10 status post intramedullary maria t insertion for femur fracture. Tolerated well. Physical/occupational therapy when able.  7/11 Transfused 1 u pRbc for post-op anemia with Hgb<7. Renal function concerning for TRACY, started on fluid resuscitation.  7/12:  Transfused another 1u pRbc for post-op anemia with Hgb<7. Renal function improving after started on fluid resuscitation.  PT/OT with recs for high intensity therapy, but patient declined and preferred home health instead  7/13:  Hemoglobin levels remained stable, cautiously resuming home anticoagulation(Eliquis) while trending hemoglobin given concerns for persistent anemia postoperatively requiring multiple blood transfusions. Patient amenable to rehab placement given his debility concerns.    Interval History: No acute events overnight, afebrile, hemodynamically stable. Hemoglobin levels remained stable, started on DVT prophylaxis.  Patient amenable to rehab placement given his debility concerns.  Objective:     Vital Signs (Most Recent):  Temp: 98.7 °F (37.1 °C) (07/13/25 2040)  Pulse: 70 (07/13/25 2132)  Resp: 16 (07/13/25 2132)  BP: 133/79 (07/13/25 2040)  SpO2: 98 % (07/13/25 2132) Vital Signs (24h Range):  Temp:  [97.6 °F (36.4 °C)-98.7 °F (37.1 °C)] 98.7 °F (37.1 °C)  Pulse:  [65-82] 70  Resp:  [16-22] 16  SpO2:  [95 %-100 %] 98 %  BP: (130-155)/(61-79) 133/79     Weight: (!) 151.7 kg (334 lb 7 oz)  Body mass index is 42.94 kg/m².    Intake/Output Summary (Last 24 hours) at 7/13/2025 2232  Last data filed at 7/13/2025 1917  Gross per 24 hour   Intake 240 ml   Output 1650 ml   Net -1410 ml         Physical Exam  Vitals and nursing note reviewed.   Constitutional:       General: He is not  in acute distress.     Appearance: He is morbidly obese. He is ill-appearing. He is not toxic-appearing.   HENT:      Head: Normocephalic and atraumatic.   Eyes:      General: No scleral icterus.        Right eye: No discharge.         Left eye: No discharge.   Cardiovascular:      Rate and Rhythm: Normal rate.   Pulmonary:      Effort: Pulmonary effort is normal. No respiratory distress.   Abdominal:      Palpations: Abdomen is soft.      Tenderness: There is no abdominal tenderness.   Musculoskeletal:        Legs:    Skin:     General: Skin is warm.      Coloration: Skin is pale. Skin is not jaundiced.   Neurological:      Mental Status: He is alert and easily aroused.      Motor: Weakness present.               Significant Labs: All pertinent labs within the past 24 hours have been reviewed.   Recent Labs   Lab 07/11/25  0512 07/12/25  0921 07/13/25  0937   * 133* 139   K 4.2 4.0 3.5    102 106   CO2 21* 24 24   BUN 39* 47* 30*   CREATININE 2.7* 1.4 0.8   * 279* 218*   ANIONGAP 10 7* 9     Recent Labs   Lab 07/11/25  0512 07/12/25  0921 07/13/25  0937   MG 1.8 2.0 2.2   PHOS  --  2.7 1.9*     Recent Labs   Lab 07/11/25  0512 07/12/25  0921 07/13/25  0937   AST 40 41 42   ALT 24 22 29   ALKPHOS 46 50 59   BILITOT 0.6 1.0 1.3*   ALBUMIN 2.8* 2.4* 2.6*     POCT Glucose:   Recent Labs   Lab 07/13/25  1136 07/13/25  1707 07/13/25 2011   POCTGLUCOSE 297* 265* 271*    Recent Labs   Lab 07/12/25  0714 07/12/25  2344 07/13/25  0937   WBC 9.69 7.67 9.92   HGB 6.6* 6.9* 8.1*   HCT 20.3* 20.2* 24.6*    149* 188                         Significant Imaging:  I have reviewed all pertinent imaging results/findings within the past 24 hours.   X-Ray Femur 2 View Left   Final Result      Satisfactory postoperative study.         Electronically signed by: Darion Ricci MD   Date:    07/10/2025   Time:    11:50      SURG FL Surgery Fluoro Usage   Final Result      X-Ray Femur 2 View Left   Final Result       See above.         Electronically signed by: Kadeem Taylor MD   Date:    07/10/2025   Time:    10:08      X-Ray Femur Ap/Lat Left   Final Result      There is an acute appearing fracture in the trochanteric portion of the left femur.         Electronically signed by: Zack King MD   Date:    07/08/2025   Time:    08:42      X-Ray Pelvis Routine AP   Final Result      There is an acute appearing fracture in the intertrochanteric portion of the left femur.         Electronically signed by: Zack King MD   Date:    07/08/2025   Time:    08:41      X-Ray Chest AP Portable   Final Result      1. There is no focal pulmonary infiltrate visualized.   2. There is moderate cardiomegaly.   .         Electronically signed by: Zack King MD   Date:    07/08/2025   Time:    08:39      CT Head Without Contrast   Final Result      1. There is no calvarial fracture or intracranial hemorrhage.   2. Right maxillary sinusitis   3. There is a 13 mm polyp versus mucous retention cyst in the left maxillary sinus.   All CT scans at this facility use dose modulation, iterative reconstruction, and/or weight base dosing when appropriate to reduce radiation dose when appropriate to reduce radiation dose to as low as reasonably achievable.         Electronically signed by: Zack King MD   Date:    07/08/2025   Time:    08:14          Inpatient Medications:    Scheduled Meds:   atorvastatin  20 mg Oral Daily    carvediloL  6.25 mg Oral BID WM    chlorhexidine  10 mL Mouth/Throat BID    enoxparin  40 mg Subcutaneous BID    flecainide  50 mg Oral Q12H    insulin glargine U-100  13 Units Subcutaneous QHS       PRN Meds:  Current Facility-Administered Medications:     0.9%  NaCl infusion (for blood administration), , Intravenous, Q24H PRN    0.9%  NaCl infusion (for blood administration), , Intravenous, Q24H PRN    0.9%  NaCl infusion (for blood administration), , Intravenous, Q24H PRN    acetaminophen, 650 mg, Oral, Q4H  PRN    aluminum-magnesium hydroxide-simethicone, 30 mL, Oral, QID PRN    dextrose 50%, 12.5 g, Intravenous, PRN    dextrose 50%, 25 g, Intravenous, PRN    glucagon (human recombinant), 1 mg, Intramuscular, PRN    glucose, 16 g, Oral, PRN    glucose, 24 g, Oral, PRN    hydrALAZINE, 10 mg, Intravenous, Q6H PRN    HYDROcodone-acetaminophen, 1 tablet, Oral, Q4H PRN    insulin aspart U-100, 0-10 Units, Subcutaneous, QID (AC + HS) PRN    melatonin, 6 mg, Oral, Nightly PRN    morphine, 4 mg, Intravenous, Q4H PRN    naloxone, 0.02 mg, Intravenous, PRN    ondansetron, 4 mg, Intravenous, Q12H PRN    polyethylene glycol, 17 g, Oral, BID PRN    prochlorperazine, 5 mg, Intravenous, Q6H PRN    promethazine, 25 mg, Rectal, Q6H PRN    sodium chloride 0.9%, 10 mL, Intravenous, Q12H PRN            Assessment & Plan  Closed nondisplaced subtrochanteric fracture of left femur  Patient had a fall at home PTA, xray left femur: There is an acute appearing fracture in the intertrochanteric portion of the left femur.     Status post intramedullary maria t insertion per ortho  PT/OT consulted, follow-up recs  Orthopedic surgery consulted, follow-up recs      Liver cirrhosis  MELD-Na score calculated; MELD 3.0: 8 at 7/10/2025  5:13 AM  MELD-Na: 8 at 7/10/2025  5:13 AM  Calculated from:  Serum Creatinine: 0.7 mg/dL (Using min of 1 mg/dL) at 7/10/2025  5:13 AM  Serum Sodium: 137 mmol/L at 7/10/2025  5:13 AM  Total Bilirubin: 1.2 mg/dL at 7/10/2025  5:13 AM  Serum Albumin: 3.1 g/dL at 7/10/2025  5:13 AM  INR(ratio): 1.1 at 7/8/2025  7:39 AM  Age at listing (hypothetical): 60 years  Sex: Male at 7/10/2025  5:13 AM      Continue chronic meds. Etiology likely ETOH. Will avoid any hepatotoxic meds, and monitor CBC/CMP/INR for synthetic function.   PAF (paroxysmal atrial fibrillation)  Patient has paroxysmal (<7 days) atrial fibrillation. Patient is currently in sinus rhythm. [id number] Score: 1. The patients heart rate in the last 24 hours is as  follows:  Pulse  Min: 65  Max: 82     Antiarrhythmics  flecainide tablet 50 mg, Every 12 hours, Oral    Anticoagulants  apixaban tablet 5 mg, 2 times daily, Oral    Plan  - Replete lytes with a goal of K>4, Mg >2  - 07/13: Hemoglobin levels remained stable, cautiously resuming home anticoagulation(Eliquis) while trending hemoglobin given concerns for persistent anemia postoperatively requiring multiple blood transfusions.   - Patient's afib is currently controlled      Primary hypertension  Patient's blood pressure range in the last 24 hours was: BP  Min: 130/61  Max: 155/75.The patient's inpatient anti-hypertensive regimen is listed below:  Current Antihypertensives  carvediloL tablet 6.25 mg, 2 times daily with meals, Oral  hydrALAZINE injection 10 mg, Every 6 hours PRN, Intravenous    Plan  - BP is controlled, no changes needed to their regimen    EVELYN on CPAP  --CPAP @ HS  Recommended patient bring home unit  At risk for hypercapnia     Insulin dependent type 2 diabetes mellitus  Patient's FSGs are controlled on current medication regimen.  Last A1c reviewed-   Lab Results   Component Value Date    HGBA1C 9.9 (H) 07/08/2025     Most recent fingerstick glucose reviewed-   Recent Labs   Lab 07/13/25  0625 07/13/25  1136 07/13/25  1707 07/13/25 2011   POCTGLUCOSE 257* 297* 265* 271*     Current correctional scale  Medium  Maintain anti-hyperglycemic dose as follows-   Antihyperglycemics (From admission, onward)      Start     Stop Route Frequency Ordered    07/11/25 2100  insulin glargine U-100 (Lantus) pen 13 Units         -- SubQ Nightly 07/08/25 1646    07/10/25 1722  insulin aspart U-100 pen 0-10 Units         -- SubQ Before meals & nightly PRN 07/10/25 1622          Hold Oral hypoglycemics while patient is in the hospital.  Diabetic education  Moderate dose sliding scale to promote wound healing    VTE Risk Mitigation (From admission, onward)           Ordered     apixaban tablet 5 mg  2 times daily          07/13/25 2241     IP VTE HIGH RISK PATIENT  Once         07/08/25 1052     Place sequential compression device  Until discontinued         07/08/25 1052     Reason for No Pharmacological VTE Prophylaxis  Once        Question:  Reasons:  Answer:  IV Heparin w/in 24 hrs. Pre or Post-Op    07/08/25 1052                    Discharge Planning   BRANDIE: 7/12/2025     Code Status: Full Code   Medical Readiness for Discharge Date:   Discharge Plan A: Home with family              Jon Portillo DO  Department of Hospital Medicine   [facility] ([facility])    Voice recognition software was used in the creation of this note/communication and any sound-alike/typographical errors which may have occurred despite initial review prior to signing should be taken in context when interpreting.  If such errors prevent a clear understanding of the note/communication, please contact the provider/office for clarification.

## 2025-07-14 NOTE — ASSESSMENT & PLAN NOTE
Patient's FSGs are controlled on current medication regimen.  Last A1c reviewed-   Lab Results   Component Value Date    HGBA1C 9.9 (H) 07/08/2025     Most recent fingerstick glucose reviewed-   Recent Labs   Lab 07/13/25  0625 07/13/25  1136 07/13/25  1707 07/13/25 2011   POCTGLUCOSE 257* 297* 265* 271*     Current correctional scale  Medium  Maintain anti-hyperglycemic dose as follows-   Antihyperglycemics (From admission, onward)      Start     Stop Route Frequency Ordered    07/11/25 2100  insulin glargine U-100 (Lantus) pen 13 Units         -- SubQ Nightly 07/08/25 1646    07/10/25 1722  insulin aspart U-100 pen 0-10 Units         -- SubQ Before meals & nightly PRN 07/10/25 1622          Hold Oral hypoglycemics while patient is in the hospital.  Diabetic education  Moderate dose sliding scale to promote wound healing

## 2025-07-14 NOTE — ASSESSMENT & PLAN NOTE
Anemia is likely due to acute blood loss which was from recent surgical procedure. Most recent hemoglobin and hematocrit are listed below.  Recent Labs     07/12/25  2344 07/13/25  0937 07/14/25  0450   HGB 6.9* 8.1* 7.0*   HCT 20.2* 24.6* 21.6*     Plan  - Monitor serial CBC: Daily  - Transfuse PRBC if patient becomes hemodynamically unstable, symptomatic or H/H drops below 7/21.  - Patient has received 1 units of PRBCs on 7/14/2025 for total of 3 units   - Patient's anemia is currently worsening. Will continue current treatment  -iron supplementation initiated

## 2025-07-14 NOTE — PT/OT/SLP PROGRESS
Occupational Therapy   Treatment    Name: Roland Lopez  MRN: [id number]  Admitting Diagnosis:  Closed nondisplaced subtrochanteric fracture of left femur  4 Days Post-Op    Recommendations:     Discharge Recommendations: High Intensity Therapy  Discharge Equipment Recommendations:  to be determined by next level of care  Barriers to discharge:       Assessment:     Roland Lopez is a 60 y.o. male with a medical diagnosis of Closed nondisplaced subtrochanteric fracture of left femur.  He presents with the following performance deficits affecting function are weakness, impaired endurance, impaired self care skills, impaired functional mobility, gait instability, impaired balance, decreased lower extremity function, decreased upper extremity function, decreased ROM, impaired cardiopulmonary response to activity, orthopedic precautions.     Rehab Prognosis:  Fair; patient would benefit from acute skilled OT services to address these deficits and reach maximum level of function.       Plan:     Patient to be seen 2 x/week to address the above listed problems via self-care/home management, therapeutic activities, therapeutic exercises  Plan of Care Expires: 07/26/25  Plan of Care Reviewed with: patient, family    Subjective     Chief Complaint: RLE pain  Patient/Family Comments/goals: Return to PLOF  Pain/Comfort:  Pain Rating 1: 5/10  Location - Side 1: Left  Location - Orientation 1: generalized  Location 1: leg  Pain Addressed 1:  (Activity pacing)    Objective:     Communicated with: Nurse and EPIC chart review prior to session.  Patient found sitting edge of bed with   upon OT entry to room.    General Precautions: Standard, fall    Orthopedic Precautions:LLE toe touch weight bearing  Braces: N/A  Respiratory Status: Room air     Occupational Performance:     Bed Mobility:    Pt completed forward scoot from EOB with SBA    Functional Mobility/Transfers:  Patient completed Sit <> Stand Transfer with maximal  assistance  with  rolling walker x2 persons  Pt required v/c for sequence of task  V/c for body mechanics  Elevated bed  Patient completed Bed <> Chair Transfer using Stand Pivot technique with maximal assistance with rolling walker  Fatigues quickly  Required chair moved to behind patient 8 inches  Functional Mobility: Unsafe at this time      [facility] 6 Click ADL: 14    Treatment & Education:  Pt completed the following Therex 1x10 in order to increase BM and FM:   Heel slides   Ankle pumps  Pt displays swelling in RLE and difficulty moving since surgery. Pt encouraged to work on moving RLE safely in bed and chair and work toward keeping toes forward.  OT role, plan of care, progression of goals, importance of continued OOB activity, ADL/functional transfer and mobility retraining, call don't fall, safety precautions, fall prevention. Pt educated on sitting in chair for 1 hour during breakfast lunch and dinner in order to change positions, regulate BP and reduce bed sores.   Pt acknowledges and agrees with POC given by OT.      Patient left up in chair with all lines intact, call button in reach, chair alarm on, and family present    GOALS:   Multidisciplinary Problems       Occupational Therapy Goals          Problem: Occupational Therapy    Goal Priority Disciplines Outcome Interventions   Occupational Therapy Goal     OT, PT/OT Progressing    Description: O.T. GOALS TO BE MET 7-26-25  PT WILL TOLERATE 1 SET X 15 REPS B UE ROM EXERCISE  PT REQ MAX A WITH BSC  TRANSFERS  PT REQ S WITH TOILET ING  PT REQ S WITH UE DRESSING                     Time Tracking:     OT Date of Treatment: 07/14/25  OT Start Time: 0825  OT Stop Time: 0850  OT Total Time (min): 25 min    Billable Minutes:Therapeutic Activity 15  Therapeutic Exercise 10    OT/SUE: SUE     Number of SUE visits since last OT visit: 1  OTR/L readily available for conference at the time of the provision of services-  Deidra Hickman,  [place]    7/14/2025

## 2025-07-14 NOTE — CONSULTS
07/14/25 1157   Post-Acute Status   Post-Acute Authorization Placement   Post-Acute Placement Status Referrals Sent   Discharge Delays None known at this time   Discharge Plan   Discharge Plan A Rehab     SW met with Patient at bedside to review discharge recommendation of Inpatient Rehab Placement upon discharge and is agreeable to plan.    Patient/family provided list of facilities in-network with patient's payor plan. Providers that are owned, operated, or affiliated with [facility] are included on the list.     Notified that referral sent to below listed facilities from in-network list based on proximity to home/family support:   [facility]    Patient/family instructed to identify preference.    Preferred Facility: (if more than 1, listed in order of descending preference)  [facility]    If an additional preferred facility not listed above is identified, additional referral to be sent. If above facilities unable to accept, will send additional referrals to in-network providers.     SW completed [facility] discharge worksheet and set worksheet with clinicals to [facility] for approval.     SW will continue to follow and assist as needed.

## 2025-07-14 NOTE — PT/OT/SLP PROGRESS
Physical Therapy  Treatment    Roland Lopez   MRN: [id number]   Admitting Diagnosis: Closed nondisplaced subtrochanteric fracture of left femur    PT Received On: 07/14/25  PT Start Time: 0825     PT Stop Time: 0855    PT Total Time (min): 30 min       Billable Minutes:  Therapeutic Activity 30    Treatment Type: Treatment  PT/PTA: PTA     Number of PTA visits since last PT visit: 1       General Precautions: Standard, fall  Orthopedic Precautions: LLE toe touch weight bearing  Braces: N/A  Respiratory Status: Room air    Spiritual, Cultural Beliefs, Shinto Practices, Values that Affect Care: no    Subjective:  Completed Epic chart review prior to PT session.   Patient agreed to participate in PT session.     Pain/Comfort  Pain Rating 1: 5/10  Location - Side 1: Left  Location 1: leg  Pain Addressed 1: Other (see comments) (ACTIVITY PACING)    Objective:   Patient found with: peripheral IV, telemetry    Patient found sitting EOB.     STS from EOB > agueda RW x3 attempts with 1 success requiring Max A of 2 and elevated EOB    Stand pivot T/F from EOB > chair w/ agueda RW: Max A of 2   Increased time required to complete. VC required to maintain WB status to LLE and for pursed lip breathing throughout.    Reviewed AROM TE to BLE including: hip flex/ext, knee flex/ext, ankle PF/DF  To be completed a minimum of 10 reps for each LE in order to promote return of function, strength and ROM.     Educated patient on importance of increased tolerance to upright position and direct impact on CV endurance and strength. Patient encouraged to sit up in chair/ EOB, for a minimum of 2 consecutive hours, 3x per day. Encouraged patient to perform AROM TE to BLE throughout the day within all available planes of motion. Re enforced importance of utilizing call light to meet needs in room and not attempt to get up without staff assistance. Patient verbalized understanding and agreed to comply.       AM-PAC 6 CLICK MOBILITY  How much help  from another person does this patient currently need?   1 = Unable, Total/Dependent Assistance  2 = A lot, Maximum/Moderate Assistance  3 = A little, Minimum/Contact Guard/Supervision  4 = None, Modified [place]/Independent    Turning over in bed (including adjusting bedclothes, sheets and blankets)?: 1 (NT)  Sitting down on and standing up from a chair with arms (e.g., wheelchair, bedside commode, etc.): 2  Moving from lying on back to sitting on the side of the bed?: 1 (NT)  Moving to and from a bed to a chair (including a wheelchair)?: 2  Need to walk in hospital room?: 1  Climbing 3-5 steps with a railing?: 1 (NT)  Basic Mobility Total Score: 8    AM-PAC Raw Score CMS G-Code Modifier Level of Impairment Assistance   6 % Total / Unable   7 - 9 CM 80 - 100% Maximal Assist   10 - 14 CL 60 - 80% Moderate Assist   15 - 19 CK 40 - 60% Moderate Assist   20 - 22 CJ 20 - 40% Minimal Assist   23 CI 1-20% SBA / CGA   24 CH 0% Independent/ Mod I     Patient left up in chair with call button in reach and BLE elevated. Encouraged patient to alternate between BLE elevated and full upright throughout time in chair.     Assessment:  Roland Lopez is a 60 y.o. male with a medical diagnosis of Closed nondisplaced subtrochanteric fracture of left femur and presents with overall decline in functional mobility. Patient would continue to benefit from skilled PT to address functional limitations listed below in order to return to PLOF/decrease caregiver burden.     Rehab identified problem list/impairments: weakness, impaired endurance, impaired self care skills, gait instability, impaired balance, pain, decreased safety awareness, impaired functional mobility, decreased lower extremity function, decreased upper extremity function, decreased ROM, impaired cardiopulmonary response to activity, orthopedic precautions    Rehab potential is fair.    Activity tolerance: Fair    Discharge recommendations: High Intensity  Therapy      Barriers to discharge:      Equipment recommendations: to be determined by next level of care     GOALS:   Multidisciplinary Problems       Physical Therapy Goals          Problem: Physical Therapy    Goal Priority Disciplines Outcome Interventions   Physical Therapy Goal     PT, PT/OT Progressing    Description: Patient will be seen a minimal of 3 out of 7 days a week.    LTG to be met by 07/27/25:     Bed mobility: Min A  Transfers: Min A with RW  Gait: Min A with RW x20 feet                        DME Justifications:  No DME recommended requiring DME justifications    PLAN:    Patient to be seen 3 x/week to address the above listed problems via therapeutic activities, therapeutic exercises, neuromuscular re-education  Plan of Care expires: 07/27/25  Plan of Care reviewed with: patient         07/14/2025

## 2025-07-14 NOTE — ASSESSMENT & PLAN NOTE
Patient's blood pressure range in the last 24 hours was: BP  Min: 133/79  Max: 151/76.The patient's inpatient anti-hypertensive regimen is listed below:  Current Antihypertensives  carvediloL tablet 6.25 mg, 2 times daily with meals, Oral  hydrALAZINE injection 10 mg, Every 6 hours PRN, Intravenous    Plan  - BP is controlled, no changes needed to their regimen

## 2025-07-14 NOTE — PROGRESS NOTES
[facility]  Progress Note    Patient Name: Roland Lopez  MRN: [id number]  Patient Class: IP- Inpatient   Admission Date: 7/8/2025  Length of Stay: 6 days  Attending Physician: Melanie Torres MD  Primary Care Provider: Emilee, Primary Doctor        Subjective     Principal Problem:Closed nondisplaced subtrochanteric fracture of left femur        HPI:  60 y.o. male with a PMHx of HTN, EVELYN, DM, HLD, and paroxysmal A fib who presented to the Emergency Department for evaluation of a fall which occurred this morning. Pt state he was getting ready for work when he slipped on what he thinks was cat urine in his home. Pt tried to use the installed railings in his home but could not catch himself. Symptoms are constant and moderate in severity. No mitigating or exacerbating factors reported. Associated sxs include left leg pain and left hip pain. Patient denies any nausea or LOC, numbness or tingling. No prior Tx specified. Pt reports he takes eliquis. No further complaints or concerns at this time.   ED course: H/H: 13.3/40.4, Plt 157K, glucose 280, PTT: Wnl, PT/INR:WNL. Given 4mg Morphine for pain in ED.     Left Femur xray, Pelvic xray: There is an acute appearing fracture in the trochanteric portion of the left femur.   CXR: no acute findings, CT head: There is no calvarial fracture or intracranial hemorrhage, other findings insignificant  Consulted Orthopedic Surgery, recommended admission for surgery later today.  I note some minor lab abnormalities that have been stable over time, these are of doubtful clinical significance.       Patient is a full code.   After evaluating the patients clinical presentation, vitals, labs, and risk factors, the attending physician and I decided to admit the patient for monitoring, diagnostics, and treatment due to the risk of deterioration if managed outpatient   Admitted to  for management of Left Femur Fracture.     Overview/Hospital  Course:  7/9 admitted for acute left hip fracture status post slip and fall. Ortho consulted and recommended surgical intervention. Due to recent use of ozempic, surgery for today after being npo x 24h+h. Complains of pain. Denies any problems with anesthesia in the past.   7/10 status post intramedullary maria t insertion for femur fracture. Tolerated well. Physical/occupational therapy when able.  7/11 Transfused 1 u pRbc for post-op anemia with Hgb<7. Renal function concerning for TRACY, started on fluid resuscitation.  7/12:  Transfused another 1u pRbc for post-op anemia with Hgb<7. Renal function improving after started on fluid resuscitation.  PT/OT with recs for high intensity therapy, but patient declined and preferred home health instead  7/13:  Hemoglobin levels remained stable, cautiously resuming home anticoagulation(Eliquis) while trending hemoglobin given concerns for persistent anemia postoperatively requiring multiple blood transfusions. Patient amenable to rehab placement given his debility concerns.  On 7/14/2025, H/H declined to 7/21.6 with PRBCs x 1 unit transfused and iron supplementation initiated. DVT prophylaxis held. Hyperglycemia noted with Lantus BID initiated. CM consulted for Rehab placement.     Interval History: pt in bed upon exam with no signs of acute distress witnessed or reported and pain controlled on prescribed regime. CM consulted to assist with Rehab placement.     Review of Systems   Constitutional:  Positive for activity change. Negative for appetite change and fatigue.   HENT: Negative.     Respiratory: Negative.     Gastrointestinal: Negative.    Genitourinary: Negative.    Musculoskeletal:  Positive for gait problem and myalgias.   Skin:  Positive for wound.   Psychiatric/Behavioral: Negative.       Objective:     Vital Signs (Most Recent):  Temp: 97.5 °F (36.4 °C) (07/14/25 1211)  Pulse: 73 (07/14/25 1211)  Resp: 18 (07/14/25 1211)  BP: (!) 148/70 (07/14/25 1211)  SpO2: 97 %  (07/14/25 1211) Vital Signs (24h Range):  Temp:  [97.5 °F (36.4 °C)-98.7 °F (37.1 °C)] 97.5 °F (36.4 °C)  Pulse:  [67-86] 73  Resp:  [16-23] 18  SpO2:  [94 %-100 %] 97 %  BP: (133-151)/(63-79) 148/70     Weight:  (Weight not measured, foot of bed is detatched.)  Body mass index is 42.94 kg/m².    Intake/Output Summary (Last 24 hours) at 7/14/2025 1217  Last data filed at 7/14/2025 0718  Gross per 24 hour   Intake 240 ml   Output 2100 ml   Net -1860 ml         Physical Exam  Constitutional:       Appearance: He is obese.   HENT:      Mouth/Throat:      Mouth: Mucous membranes are moist.      Pharynx: Oropharynx is clear.   Cardiovascular:      Rate and Rhythm: Normal rate and regular rhythm.      Pulses: Normal pulses.   Pulmonary:      Effort: Pulmonary effort is normal.      Comments: Decreased bibasilar  Abdominal:      General: Bowel sounds are normal.      Palpations: Abdomen is soft.   Musculoskeletal:         General: Swelling (LLE) and tenderness (LLE) present.   Skin:     General: Skin is warm and dry.      Comments: Surgical incision to Left thigh    Neurological:      Mental Status: He is alert and oriented to person, place, and time.   Psychiatric:         Mood and Affect: Mood normal.               Significant Labs: All pertinent labs within the past 24 hours have been reviewed.    Significant Imaging: I have reviewed all pertinent imaging results/findings within the past 24 hours.      Assessment & Plan  Closed nondisplaced subtrochanteric fracture of left femur  Patient had a fall at home PTA, xray left femur: There is an acute appearing fracture in the intertrochanteric portion of the left femur.     Status post intramedullary maria t insertion per ortho  PT/OT consulted with evaluation completed and Rehab recommended   Orthopedic surgery consulted, follow-up recs      Liver cirrhosis  MELD-Na score calculated; MELD 3.0: 8 at 7/10/2025  5:13 AM  MELD-Na: 8 at 7/10/2025  5:13 AM  Calculated from:  Serum  Creatinine: 0.7 mg/dL (Using min of 1 mg/dL) at 7/10/2025  5:13 AM  Serum Sodium: 137 mmol/L at 7/10/2025  5:13 AM  Total Bilirubin: 1.2 mg/dL at 7/10/2025  5:13 AM  Serum Albumin: 3.1 g/dL at 7/10/2025  5:13 AM  INR(ratio): 1.1 at 7/8/2025  7:39 AM  Age at listing (hypothetical): 60 years  Sex: Male at 7/10/2025  5:13 AM      Continue chronic meds. Etiology likely ETOH. Will avoid any hepatotoxic meds, and monitor CBC/CMP/INR for synthetic function.   PAF (paroxysmal atrial fibrillation)  Patient has paroxysmal (<7 days) atrial fibrillation. Patient is currently in sinus rhythm. [id number] Score: 1. The patients heart rate in the last 24 hours is as follows:  Pulse  Min: 67  Max: 86     Antiarrhythmics  flecainide tablet 50 mg, Every 12 hours, Oral    Anticoagulants  apixaban tablet 2.5 mg, 2 times daily, Oral    Plan  - Replete lytes with a goal of K>4, Mg >2  - 07/13: Hemoglobin levels remained stable, cautiously resuming home anticoagulation(Eliquis) while trending hemoglobin given concerns for persistent anemia postoperatively requiring multiple blood transfusions.   7/14: H/H trending down post procedure - Eliquis dose decreased   - Patient's afib is currently controlled      Primary hypertension  Patient's blood pressure range in the last 24 hours was: BP  Min: 133/79  Max: 151/76.The patient's inpatient anti-hypertensive regimen is listed below:  Current Antihypertensives  carvediloL tablet 6.25 mg, 2 times daily with meals, Oral  hydrALAZINE injection 10 mg, Every 6 hours PRN, Intravenous    Plan  - BP is controlled, no changes needed to their regimen    EVELYN on CPAP  --CPAP @ HS  Recommended patient bring home unit  At risk for hypercapnia     Insulin dependent type 2 diabetes mellitus  Patient's FSGs are controlled on current medication regimen.  Last A1c reviewed-   Lab Results   Component Value Date    HGBA1C 9.9 (H) 07/08/2025     Most recent fingerstick glucose reviewed-   Recent Labs   Lab  07/13/25  1707 07/13/25  2011 07/14/25  0637 07/14/25  1102   POCTGLUCOSE 265* 271* 220* 242*     Current correctional scale  Medium  Maintain anti-hyperglycemic dose as follows-   Antihyperglycemics (From admission, onward)      Start     Stop Route Frequency Ordered    07/14/25 1215  insulin glargine U-100 (Lantus) pen 10 Units         -- SubQ Daily 07/14/25 1200    07/11/25 2100  insulin glargine U-100 (Lantus) pen 13 Units         -- SubQ Nightly 07/08/25 1646    07/10/25 1722  insulin aspart U-100 pen 0-10 Units         -- SubQ Before meals & nightly PRN 07/10/25 1622          Hold Oral hypoglycemics while patient is in the hospital.  Diabetic education  Moderate dose sliding scale to promote wound healing  -Lantus dose adjusted to BID  Anemia  Anemia is likely due to acute blood loss which was from recent surgical procedure. Most recent hemoglobin and hematocrit are listed below.  Recent Labs     07/12/25  2344 07/13/25  0937 07/14/25  0450   HGB 6.9* 8.1* 7.0*   HCT 20.2* 24.6* 21.6*     Plan  - Monitor serial CBC: Daily  - Transfuse PRBC if patient becomes hemodynamically unstable, symptomatic or H/H drops below 7/21.  - Patient has received 1 units of PRBCs on 7/14/2025 for total of 3 units   - Patient's anemia is currently worsening. Will continue current treatment  -iron supplementation initiated  VTE Risk Mitigation (From admission, onward)           Ordered     apixaban tablet 2.5 mg  2 times daily         07/14/25 1223     IP VTE HIGH RISK PATIENT  Once         07/08/25 1052     Place sequential compression device  Until discontinued         07/08/25 1052     Reason for No Pharmacological VTE Prophylaxis  Once        Question:  Reasons:  Answer:  IV Heparin w/in 24 hrs. Pre or Post-Op    07/08/25 1052                    Discharge Planning   BRANDIE: 7/12/2025     Code Status: Full Code   Medical Readiness for Discharge Date:   Discharge Plan A: Rehab   Discharge Delays: None known at this  time              Please place Justification for DME      Katie Elias NP  Department of Hospital Medicine   [facility] - Telemetry ([facility])

## 2025-07-14 NOTE — ASSESSMENT & PLAN NOTE
Patient had a fall at home PTA, xray left femur: There is an acute appearing fracture in the intertrochanteric portion of the left femur.     Status post intramedullary maria t insertion per ortho  PT/OT consulted, follow-up recs  Orthopedic surgery consulted, follow-up recs

## 2025-07-14 NOTE — ASSESSMENT & PLAN NOTE
Patient's blood pressure range in the last 24 hours was: BP  Min: 130/61  Max: 155/75.The patient's inpatient anti-hypertensive regimen is listed below:  Current Antihypertensives  carvediloL tablet 6.25 mg, 2 times daily with meals, Oral  hydrALAZINE injection 10 mg, Every 6 hours PRN, Intravenous    Plan  - BP is controlled, no changes needed to their regimen     Her/She

## 2025-07-14 NOTE — ASSESSMENT & PLAN NOTE
Patient had a fall at home PTA, xray left femur: There is an acute appearing fracture in the intertrochanteric portion of the left femur.     Status post intramedullary maria t insertion per ortho  PT/OT consulted with evaluation completed and Rehab recommended   Orthopedic surgery consulted, follow-up recs

## 2025-07-14 NOTE — ASSESSMENT & PLAN NOTE
Patient has paroxysmal (<7 days) atrial fibrillation. Patient is currently in sinus rhythm. [id number] Score: 1. The patients heart rate in the last 24 hours is as follows:  Pulse  Min: 65  Max: 82     Antiarrhythmics  flecainide tablet 50 mg, Every 12 hours, Oral    Anticoagulants  apixaban tablet 5 mg, 2 times daily, Oral    Plan  - Replete lytes with a goal of K>4, Mg >2  - 07/13: Hemoglobin levels remained stable, cautiously resuming home anticoagulation(Eliquis) while trending hemoglobin given concerns for persistent anemia postoperatively requiring multiple blood transfusions.   - Patient's afib is currently controlled

## 2025-07-14 NOTE — PLAN OF CARE
Pt tolerated Tx session well. Pt shows good engagement and participation. EOB t/fs to chair Max assist x2 persons

## 2025-07-15 LAB
ABSOLUTE EOSINOPHIL (OHS): 0.13 K/UL
ABSOLUTE MONOCYTE (OHS): 0.56 K/UL (ref 0.3–1)
ABSOLUTE NEUTROPHIL COUNT (OHS): 3.64 K/UL (ref 1.8–7.7)
ALBUMIN SERPL BCP-MCNC: 2.2 G/DL (ref 3.5–5.2)
ALP SERPL-CCNC: 62 UNIT/L (ref 40–150)
ALT SERPL W/O P-5'-P-CCNC: 30 UNIT/L (ref 10–44)
ANION GAP (OHS): 4 MMOL/L (ref 8–16)
AST SERPL-CCNC: 29 UNIT/L (ref 11–45)
BASOPHILS # BLD AUTO: 0.02 K/UL
BASOPHILS NFR BLD AUTO: 0.4 %
BILIRUB SERPL-MCNC: 1.5 MG/DL (ref 0.1–1)
BUN SERPL-MCNC: 19 MG/DL (ref 6–20)
CALCIUM SERPL-MCNC: 8.6 MG/DL (ref 8.7–10.5)
CHLORIDE SERPL-SCNC: 105 MMOL/L (ref 95–110)
CO2 SERPL-SCNC: 28 MMOL/L (ref 23–29)
CREAT SERPL-MCNC: 0.7 MG/DL (ref 0.5–1.4)
ERYTHROCYTE [DISTWIDTH] IN BLOOD BY AUTOMATED COUNT: 14.5 % (ref 11.5–14.5)
GFR SERPLBLD CREATININE-BSD FMLA CKD-EPI: >60 ML/MIN/1.73/M2
GLUCOSE SERPL-MCNC: 228 MG/DL (ref 70–110)
HCT VFR BLD AUTO: 24.7 % (ref 40–54)
HGB BLD-MCNC: 7.9 GM/DL (ref 14–18)
IMM GRANULOCYTES # BLD AUTO: 0.05 K/UL (ref 0–0.04)
IMM GRANULOCYTES NFR BLD AUTO: 0.9 % (ref 0–0.5)
LYMPHOCYTES # BLD AUTO: 0.98 K/UL (ref 1–4.8)
MAGNESIUM SERPL-MCNC: 1.8 MG/DL (ref 1.6–2.6)
MCH RBC QN AUTO: 28.4 PG (ref 27–31)
MCHC RBC AUTO-ENTMCNC: 32 G/DL (ref 32–36)
MCV RBC AUTO: 89 FL (ref 82–98)
NUCLEATED RBC (/100WBC) (OHS): 0 /100 WBC
PHOSPHATE SERPL-MCNC: 2.9 MG/DL (ref 2.7–4.5)
PLATELET # BLD AUTO: 199 K/UL (ref 150–450)
PMV BLD AUTO: 9.6 FL (ref 9.2–12.9)
POCT GLUCOSE: 180 MG/DL (ref 70–110)
POCT GLUCOSE: 185 MG/DL (ref 70–110)
POCT GLUCOSE: 199 MG/DL (ref 70–110)
POCT GLUCOSE: 240 MG/DL (ref 70–110)
POTASSIUM SERPL-SCNC: 4.3 MMOL/L (ref 3.5–5.1)
PROT SERPL-MCNC: 5.7 GM/DL (ref 6–8.4)
RBC # BLD AUTO: 2.78 M/UL (ref 4.6–6.2)
RELATIVE EOSINOPHIL (OHS): 2.4 %
RELATIVE LYMPHOCYTE (OHS): 18.2 % (ref 18–48)
RELATIVE MONOCYTE (OHS): 10.4 % (ref 4–15)
RELATIVE NEUTROPHIL (OHS): 67.7 % (ref 38–73)
SODIUM SERPL-SCNC: 137 MMOL/L (ref 136–145)
WBC # BLD AUTO: 5.38 K/UL (ref 3.9–12.7)

## 2025-07-15 PROCEDURE — 25000003 PHARM REV CODE 250: Performed by: NURSE PRACTITIONER

## 2025-07-15 PROCEDURE — 85025 COMPLETE CBC W/AUTO DIFF WBC: CPT | Performed by: STUDENT IN AN ORGANIZED HEALTH CARE EDUCATION/TRAINING PROGRAM

## 2025-07-15 PROCEDURE — 99024 POSTOP FOLLOW-UP VISIT: CPT | Mod: ,,, | Performed by: PHYSICIAN ASSISTANT

## 2025-07-15 PROCEDURE — 83735 ASSAY OF MAGNESIUM: CPT | Performed by: STUDENT IN AN ORGANIZED HEALTH CARE EDUCATION/TRAINING PROGRAM

## 2025-07-15 PROCEDURE — 21400001 HC TELEMETRY ROOM

## 2025-07-15 PROCEDURE — 25000003 PHARM REV CODE 250: Performed by: ORTHOPAEDIC SURGERY

## 2025-07-15 PROCEDURE — 97110 THERAPEUTIC EXERCISES: CPT

## 2025-07-15 PROCEDURE — 99900035 HC TECH TIME PER 15 MIN (STAT)

## 2025-07-15 PROCEDURE — 97530 THERAPEUTIC ACTIVITIES: CPT

## 2025-07-15 PROCEDURE — 36415 COLL VENOUS BLD VENIPUNCTURE: CPT | Performed by: STUDENT IN AN ORGANIZED HEALTH CARE EDUCATION/TRAINING PROGRAM

## 2025-07-15 PROCEDURE — 80053 COMPREHEN METABOLIC PANEL: CPT | Performed by: STUDENT IN AN ORGANIZED HEALTH CARE EDUCATION/TRAINING PROGRAM

## 2025-07-15 PROCEDURE — 27000221 HC OXYGEN, UP TO 24 HOURS

## 2025-07-15 PROCEDURE — 84100 ASSAY OF PHOSPHORUS: CPT | Performed by: STUDENT IN AN ORGANIZED HEALTH CARE EDUCATION/TRAINING PROGRAM

## 2025-07-15 PROCEDURE — 97530 THERAPEUTIC ACTIVITIES: CPT | Mod: CQ

## 2025-07-15 PROCEDURE — 63600175 PHARM REV CODE 636 W HCPCS: Performed by: NURSE PRACTITIONER

## 2025-07-15 RX ORDER — AMLODIPINE BESYLATE 5 MG/1
5 TABLET ORAL DAILY
Status: DISCONTINUED | OUTPATIENT
Start: 2025-07-15 | End: 2025-07-16 | Stop reason: HOSPADM

## 2025-07-15 RX ADMIN — APIXABAN 2.5 MG: 2.5 TABLET, FILM COATED ORAL at 09:07

## 2025-07-15 RX ADMIN — INSULIN GLARGINE 13 UNITS: 100 INJECTION, SOLUTION SUBCUTANEOUS at 09:07

## 2025-07-15 RX ADMIN — INSULIN ASPART 1 UNITS: 100 INJECTION, SOLUTION INTRAVENOUS; SUBCUTANEOUS at 09:07

## 2025-07-15 RX ADMIN — FERROUS SULFATE TAB 325 MG (65 MG ELEMENTAL FE) 1 EACH: 325 (65 FE) TAB at 08:07

## 2025-07-15 RX ADMIN — MORPHINE SULFATE 4 MG: 4 INJECTION INTRAVENOUS at 09:07

## 2025-07-15 RX ADMIN — HYDRALAZINE HYDROCHLORIDE 10 MG: 20 INJECTION INTRAMUSCULAR; INTRAVENOUS at 08:07

## 2025-07-15 RX ADMIN — INSULIN GLARGINE 10 UNITS: 100 INJECTION, SOLUTION SUBCUTANEOUS at 08:07

## 2025-07-15 RX ADMIN — APIXABAN 2.5 MG: 2.5 TABLET, FILM COATED ORAL at 08:07

## 2025-07-15 RX ADMIN — INSULIN ASPART 4 UNITS: 100 INJECTION, SOLUTION INTRAVENOUS; SUBCUTANEOUS at 11:07

## 2025-07-15 RX ADMIN — AMLODIPINE BESYLATE 5 MG: 5 TABLET ORAL at 03:07

## 2025-07-15 RX ADMIN — FLECAINIDE ACETATE 50 MG: 50 TABLET ORAL at 09:07

## 2025-07-15 RX ADMIN — INSULIN ASPART 2 UNITS: 100 INJECTION, SOLUTION INTRAVENOUS; SUBCUTANEOUS at 07:07

## 2025-07-15 RX ADMIN — CARVEDILOL 6.25 MG: 6.25 TABLET, FILM COATED ORAL at 08:07

## 2025-07-15 RX ADMIN — ATORVASTATIN CALCIUM 20 MG: 10 TABLET, FILM COATED ORAL at 08:07

## 2025-07-15 RX ADMIN — INSULIN ASPART 2 UNITS: 100 INJECTION, SOLUTION INTRAVENOUS; SUBCUTANEOUS at 06:07

## 2025-07-15 RX ADMIN — MORPHINE SULFATE 4 MG: 4 INJECTION INTRAVENOUS at 10:07

## 2025-07-15 RX ADMIN — CARVEDILOL 6.25 MG: 6.25 TABLET, FILM COATED ORAL at 05:07

## 2025-07-15 RX ADMIN — FLECAINIDE ACETATE 50 MG: 50 TABLET ORAL at 08:07

## 2025-07-15 RX ADMIN — HYDROCODONE BITARTRATE AND ACETAMINOPHEN 1 TABLET: 5; 325 TABLET ORAL at 02:07

## 2025-07-15 NOTE — ASSESSMENT & PLAN NOTE
Patient's blood pressure range in the last 24 hours was: BP  Min: 141/69  Max: 169/77.The patient's inpatient anti-hypertensive regimen is listed below:  Current Antihypertensives  carvediloL tablet 6.25 mg, 2 times daily with meals, Oral  hydrALAZINE injection 10 mg, Every 6 hours PRN, Intravenous  amLODIPine tablet 5 mg, Daily, Oral    Plan  - BP is uncontrolled- Norvasc resumed at 5 mg daily

## 2025-07-15 NOTE — ASSESSMENT & PLAN NOTE
Anemia is likely due to acute blood loss which was from recent surgical procedure. Most recent hemoglobin and hematocrit are listed below.  Recent Labs     07/13/25  0937 07/14/25  0450 07/15/25  0538   HGB 8.1* 7.0* 7.9*   HCT 24.6* 21.6* 24.7*     Plan  - Monitor serial CBC: Daily  - Transfuse PRBC if patient becomes hemodynamically unstable, symptomatic or H/H drops below 7/21.  - Patient has received 1 units of PRBCs on 7/14/2025 for total of 3 units   - Patient's anemia is currently improved post transfusion  -iron supplementation continued

## 2025-07-15 NOTE — PT/OT/SLP PROGRESS
Occupational Therapy   Treatment    Name: Roland Lopez  MRN: [id number]  Admitting Diagnosis:  Closed nondisplaced subtrochanteric fracture of left femur  5 Days Post-Op    Recommendations:     Discharge Recommendations: High Intensity Therapy  Discharge Equipment Recommendations:  to be determined by next level of care  Barriers to discharge:  None    Assessment:     Roland Lopez is a 60 y.o. male with a medical diagnosis of Closed nondisplaced subtrochanteric fracture of left femur.  He presents with the following performance deficits affecting function are weakness, impaired endurance, impaired self care skills, impaired functional mobility, gait instability, impaired balance, decreased coordination, decreased upper extremity function, decreased lower extremity function, decreased safety awareness, decreased ROM, impaired cardiopulmonary response to activity, orthopedic precautions.     Rehab Prognosis:  Good; patient would benefit from acute skilled OT services to address these deficits and reach maximum level of function.       Plan:     Patient to be seen 2 x/week to address the above listed problems via self-care/home management, therapeutic activities, therapeutic exercises  Plan of Care Expires: 07/26/25  Plan of Care Reviewed with: patient    Subjective     Chief Complaint: LLE Pain at surgical site  Patient/Family Comments/goals: Recovery from surgery  Pain/Comfort:  Pain Rating 1: 8/10  Location - Side 1: Left  Location - Orientation 1: upper  Location 1: leg  Pain Addressed 1: Other (see comments) (Activity pacing)    Objective:     Communicated with: Nurse and EPIC chart review prior to session.  Patient found with bed in chair position with peripheral IV, telemetry upon OT entry to room.    General Precautions: Standard, fall    Orthopedic Precautions:LLE toe touch weight bearing  Braces: N/A  Respiratory Status: Room air     Occupational Performance:     Bed Mobility:    Patient completed  Rolling/Turning to Left with  moderate assistance  Patient completed Rolling/Turning to Right with moderate assistance  Patient completed Scooting/Bridging with moderate assistance  Patient completed Supine to Sit with moderate assistance  Patient completed Sit to Supine with moderate assistance   Mod assist x 2 persons for all BM  LLE management  Pt completed EOB seating 15 min SBA    Functional Mobility/Transfers:  Patient completed Sit <> Stand Transfer with maximal assistance  x2 persons with  rolling walker   X4 trials   Functional Mobility: unsafe at this time    Activities of Daily Living:  Upper Body Dressing: minimum assistance don gown      [facility] 6 Click ADL: 14    Treatment & Education:  Pt completed 2x of following Therex from standing position with RW LLE Toe touch WB:   Lt Leg lift/ marching x10   Lt Leg toe raises x10  Pt originally designated to sit EOB as much as tolerated but d/t increase pain levels Pt requested to be put supine in bed.   Educated patient on importance of increased tolerance to upright position and direct impact on CV endurance and strength. Patient encouraged to sit up in chair/ EOB, for a minimum of 2 consecutive hours, 3x per day. Encouraged patient to perform AROM TE to BLE and BUE throughout the day within all available planes of motion. Re enforced importance of utilizing call light to meet needs in room and not attempt to get up without staff assistance. Patient verbalized understanding and agreed to comply.          Patient left with bed in chair position with all lines intact, call button in reach, and bed alarm on    GOALS:   Multidisciplinary Problems       Occupational Therapy Goals          Problem: Occupational Therapy    Goal Priority Disciplines Outcome Interventions   Occupational Therapy Goal     OT, PT/OT Progressing    Description: O.T. GOALS TO BE MET 7-26-25  PT WILL TOLERATE 1 SET X 15 REPS B UE ROM EXERCISE  PT REQ MAX A WITH BSC  TRANSFERS  PT REQ S WITH  TOILET ING  PT REQ S WITH UE DRESSING                         Time Tracking:     OT Date of Treatment: 07/15/25  OT Start Time: 0725  OT Stop Time: 0810  OT Total Time (min): 45 min    Billable Minutes:Therapeutic Activity 30  Therapeutic Exercise 15    OT/SUE: SUE     Number of SUE visits since last OT visit: 2  OTR/L readily available for conference at the time of the provision of services-  ISABELLE Mcleod    7/15/2025

## 2025-07-15 NOTE — ASSESSMENT & PLAN NOTE
Patient's FSGs are controlled on current medication regimen.  Last A1c reviewed-   Lab Results   Component Value Date    HGBA1C 9.9 (H) 07/08/2025     Most recent fingerstick glucose reviewed-   Recent Labs   Lab 07/14/25  1640 07/14/25  2032 07/15/25  0731 07/15/25  1136   POCTGLUCOSE 230* 343* 199* 240*     Current correctional scale  Medium  Maintain anti-hyperglycemic dose as follows-   Antihyperglycemics (From admission, onward)      Start     Stop Route Frequency Ordered    07/14/25 1215  insulin glargine U-100 (Lantus) pen 10 Units         -- SubQ Daily 07/14/25 1200    07/11/25 2100  insulin glargine U-100 (Lantus) pen 13 Units         -- SubQ Nightly 07/08/25 1646    07/10/25 1722  insulin aspart U-100 pen 0-10 Units         -- SubQ Before meals & nightly PRN 07/10/25 1622          Hold Oral hypoglycemics while patient is in the hospital.  Diabetic education  Moderate dose sliding scale to promote wound healing  -Lantus dose adjusted to BID

## 2025-07-15 NOTE — ASSESSMENT & PLAN NOTE
Patient had a fall at home PTA, xray left femur: There is an acute appearing fracture in the intertrochanteric portion of the left femur.     Status post intramedullary maria t insertion per ortho  PT/OT consulted with evaluation completed and Rehab recommended   -case management assisting with discharge planning  Orthopedic surgery consulted, follow-up recs

## 2025-07-15 NOTE — ASSESSMENT & PLAN NOTE
Patient has paroxysmal (<7 days) atrial fibrillation. Patient is currently in sinus rhythm. [id number] Score: 1. The patients heart rate in the last 24 hours is as follows:  Pulse  Min: 63  Max: 81     Antiarrhythmics  flecainide tablet 50 mg, Every 12 hours, Oral    Anticoagulants  apixaban tablet 2.5 mg, 2 times daily, Oral    Plan  - Replete lytes with a goal of K>4, Mg >2  - 07/13: Hemoglobin levels remained stable, cautiously resuming home anticoagulation(Eliquis) while trending hemoglobin given concerns for persistent anemia postoperatively requiring multiple blood transfusions.   7/14: H/H trending down post procedure - Eliquis dose decreased   - Patient's afib is currently controlled

## 2025-07-15 NOTE — PLAN OF CARE
A204/A204 LOKESHAshley Lopez is a 60 y.o.male admitted on 7/8/2025 for Closed nondisplaced subtrochanteric fracture of left femur   Code Status: Full Code MRN: [id number]   Review of patient's allergies indicates:   Allergen Reactions    1 plus 1-f Rash    Sulfa (sulfonamide antibiotics) Rash     Past Medical History:   Diagnosis Date    Diabetes mellitus     Hyperlipidemia     Hypertension     EVELYN (obstructive sleep apnea)     Paroxysmal atrial fibrillation       PRN meds    0.9%  NaCl infusion (for blood administration), , Q24H PRN  0.9%  NaCl infusion (for blood administration), , Q24H PRN  0.9%  NaCl infusion (for blood administration), , Q24H PRN  0.9%  NaCl infusion (for blood administration), , Q24H PRN  acetaminophen, 650 mg, Q4H PRN  aluminum-magnesium hydroxide-simethicone, 30 mL, QID PRN  dextrose 50%, 12.5 g, PRN  dextrose 50%, 25 g, PRN  glucagon (human recombinant), 1 mg, PRN  glucose, 16 g, PRN  glucose, 24 g, PRN  hydrALAZINE, 10 mg, Q6H PRN  HYDROcodone-acetaminophen, 1 tablet, Q4H PRN  insulin aspart U-100, 0-10 Units, QID (AC + HS) PRN  melatonin, 6 mg, Nightly PRN  morphine, 4 mg, Q4H PRN  naloxone, 0.02 mg, PRN  ondansetron, 4 mg, Q12H PRN  polyethylene glycol, 17 g, BID PRN  prochlorperazine, 5 mg, Q6H PRN  promethazine, 25 mg, Q6H PRN  sodium chloride 0.9%, 10 mL, Q12H PRN      Chart check completed.      Orientation: oriented x 4  Tanvir Coma Scale Score: 15     Lead Monitored: Lead II Rhythm: normal sinus rhythm    Cardiac/Telemetry Box Number: [id number]  VTE Core Measure: Pharmacological prophylaxis initiated/maintained Last Bowel Movement: 07/09/25 (pt does not want to try any stool softeners/laxatives at this time.)  Diet Consistent Carbohydrate 2000 Calories (up to 75 gm per meal); Standard Tray  Voiding Characteristics: voids spontaneously without difficulty  Peyman Score: 16  Fall Risk Score: 16  Accucheck [x]   Freq? AC/HS     Lines/Drains/Airways       Peripheral Intravenous Line  Duration              Peripheral IV 07/11/25 1215 22 G Anterior;Right Forearm 3 days                         Problem: Adult Inpatient Plan of Care  Goal: Plan of Care Review  Outcome: Progressing  Goal: Patient-Specific Goal (Individualized)  Outcome: Progressing  Goal: Absence of Hospital-Acquired Illness or Injury  Outcome: Progressing  Goal: Optimal Comfort and Wellbeing  Outcome: Progressing  Goal: Readiness for Transition of Care  Outcome: Progressing     Problem: Fall Injury Risk  Goal: Absence of Fall and Fall-Related Injury  Outcome: Progressing

## 2025-07-15 NOTE — PROGRESS NOTES
[facility] Medicine  Progress Note    Patient Name: Roland Lopez  MRN: [id number]  Patient Class: IP- Inpatient   Admission Date: 7/8/2025  Length of Stay: 7 days  Attending Physician: Melanie Torres MD  Primary Care Provider: Emilee, Primary Doctor        Subjective     Principal Problem:Closed nondisplaced subtrochanteric fracture of left femur        HPI:  60 y.o. male with a PMHx of HTN, EVELYN, DM, HLD, and paroxysmal A fib who presented to the Emergency Department for evaluation of a fall which occurred this morning. Pt state he was getting ready for work when he slipped on what he thinks was cat urine in his home. Pt tried to use the installed railings in his home but could not catch himself. Symptoms are constant and moderate in severity. No mitigating or exacerbating factors reported. Associated sxs include left leg pain and left hip pain. Patient denies any nausea or LOC, numbness or tingling. No prior Tx specified. Pt reports he takes eliquis. No further complaints or concerns at this time.   ED course: H/H: 13.3/40.4, Plt 157K, glucose 280, PTT: Wnl, PT/INR:WNL. Given 4mg Morphine for pain in ED.     Left Femur xray, Pelvic xray: There is an acute appearing fracture in the trochanteric portion of the left femur.   CXR: no acute findings, CT head: There is no calvarial fracture or intracranial hemorrhage, other findings insignificant  Consulted Orthopedic Surgery, recommended admission for surgery later today.  I note some minor lab abnormalities that have been stable over time, these are of doubtful clinical significance.       Patient is a full code.   After evaluating the patients clinical presentation, vitals, labs, and risk factors, the attending physician and I decided to admit the patient for monitoring, diagnostics, and treatment due to the risk of deterioration if managed outpatient   Admitted to  for management of Left Femur Fracture.     Overview/Hospital  Course:  7/9 admitted for acute left hip fracture status post slip and fall. Ortho consulted and recommended surgical intervention. Due to recent use of ozempic, surgery for today after being npo x 24h+h. Complains of pain. Denies any problems with anesthesia in the past.   7/10 status post intramedullary maria t insertion for femur fracture. Tolerated well. Physical/occupational therapy when able.  7/11 Transfused 1 u pRbc for post-op anemia with Hgb<7. Renal function concerning for TRACY, started on fluid resuscitation.  7/12:  Transfused another 1u pRbc for post-op anemia with Hgb<7. Renal function improving after started on fluid resuscitation.  PT/OT with recs for high intensity therapy, but patient declined and preferred home health instead  7/13:  Hemoglobin levels remained stable, cautiously resuming home anticoagulation(Eliquis) while trending hemoglobin given concerns for persistent anemia postoperatively requiring multiple blood transfusions. Patient amenable to rehab placement given his debility concerns.  On 7/14/2025, H/H declined to 7/21.6 with PRBCs x 1 unit transfused and iron supplementation initiated. DVT prophylaxis held. Hyperglycemia noted with Lantus BID initiated. CM consulted for Rehab placement. On 7/15/2025, H/H improved post transfusion. RLE edema noted with Eliquis continued at decreased dose.  Awaiting rehab placement.     Interval History:  Patient in bed upon exam with RLE edema noted.  H/H improved post transfusion.  Rehab placement in progress.    Review of Systems   Constitutional:  Positive for activity change. Negative for appetite change and fatigue.   HENT: Negative.     Respiratory: Negative.     Cardiovascular:  Positive for leg swelling.   Gastrointestinal: Negative.    Genitourinary: Negative.    Musculoskeletal:  Positive for gait problem and myalgias.   Skin:  Positive for wound.   Psychiatric/Behavioral: Negative.       Objective:     Vital Signs (Most Recent):  Temp: 97.9 °F  (36.6 °C) (07/15/25 1049)  Pulse: 73 (07/15/25 1049)  Resp: 18 (07/15/25 1002)  BP: (!) 162/76 (07/15/25 1049)  SpO2: 99 % (07/15/25 1049) Vital Signs (24h Range):  Temp:  [97.7 °F (36.5 °C)-98.8 °F (37.1 °C)] 97.9 °F (36.6 °C)  Pulse:  [63-81] 73  Resp:  [18-22] 18  SpO2:  [93 %-99 %] 99 %  BP: (141-169)/(69-78) 162/76     Weight:  (Weight not measured, foot of bed is detatched.)  Body mass index is 42.94 kg/m².    Intake/Output Summary (Last 24 hours) at 7/15/2025 1426  Last data filed at 7/15/2025 0450  Gross per 24 hour   Intake 368.75 ml   Output 650 ml   Net -281.25 ml         Physical Exam  Constitutional:       Appearance: He is obese.   HENT:      Mouth/Throat:      Mouth: Mucous membranes are moist.      Pharynx: Oropharynx is clear.   Cardiovascular:      Rate and Rhythm: Normal rate and regular rhythm.      Pulses: Normal pulses.   Pulmonary:      Effort: Pulmonary effort is normal.      Comments: Decreased bibasilar  Abdominal:      General: Bowel sounds are normal.      Palpations: Abdomen is soft.   Musculoskeletal:         General: Swelling (LLE) and tenderness (LLE) present.   Skin:     General: Skin is warm and dry.      Comments: Surgical incision to Left thigh w/ serous drainage noted    Neurological:      Mental Status: He is alert and oriented to person, place, and time.   Psychiatric:         Mood and Affect: Mood normal.               Significant Labs: All pertinent labs within the past 24 hours have been reviewed.    Significant Imaging: I have reviewed all pertinent imaging results/findings within the past 24 hours.      Assessment & Plan  Closed nondisplaced subtrochanteric fracture of left femur  Patient had a fall at home PTA, xray left femur: There is an acute appearing fracture in the intertrochanteric portion of the left femur.     Status post intramedullary maria t insertion per ortho  PT/OT consulted with evaluation completed and Rehab recommended   -case management assisting with  discharge planning  Orthopedic surgery consulted, follow-up recs      Liver cirrhosis  MELD 3.0: 8 at 7/10/2025  5:13 AM  MELD-Na: 8 at 7/10/2025  5:13 AM  Calculated from:  Serum Creatinine: 0.7 mg/dL (Using min of 1 mg/dL) at 7/10/2025  5:13 AM  Serum Sodium: 137 mmol/L at 7/10/2025  5:13 AM  Total Bilirubin: 1.2 mg/dL at 7/10/2025  5:13 AM  Serum Albumin: 3.1 g/dL at 7/10/2025  5:13 AM  INR(ratio): 1.1 at 7/8/2025  7:39 AM  Age at listing (hypothetical): 60 years  Sex: Male at 7/10/2025  5:13 AM       Continue chronic meds. Etiology likely ETOH. Will avoid any hepatotoxic meds, and monitor CBC/CMP/INR for synthetic function.   PAF (paroxysmal atrial fibrillation)  Patient has paroxysmal (<7 days) atrial fibrillation. Patient is currently in sinus rhythm. [id number] Score: 1. The patients heart rate in the last 24 hours is as follows:  Pulse  Min: 63  Max: 81     Antiarrhythmics  flecainide tablet 50 mg, Every 12 hours, Oral    Anticoagulants  apixaban tablet 2.5 mg, 2 times daily, Oral    Plan  - Replete lytes with a goal of K>4, Mg >2  - 07/13: Hemoglobin levels remained stable, cautiously resuming home anticoagulation(Eliquis) while trending hemoglobin given concerns for persistent anemia postoperatively requiring multiple blood transfusions.   7/14: H/H trending down post procedure - Eliquis dose decreased   - Patient's afib is currently controlled      Primary hypertension  Patient's blood pressure range in the last 24 hours was: BP  Min: 141/69  Max: 169/77.The patient's inpatient anti-hypertensive regimen is listed below:  Current Antihypertensives  carvediloL tablet 6.25 mg, 2 times daily with meals, Oral  hydrALAZINE injection 10 mg, Every 6 hours PRN, Intravenous  amLODIPine tablet 5 mg, Daily, Oral    Plan  - BP is uncontrolled- Norvasc resumed at 5 mg daily    EVELYN on CPAP  --CPAP @ HS  Recommended patient bring home unit  At risk for hypercapnia     Insulin dependent type 2 diabetes mellitus  Patient's  FSGs are controlled on current medication regimen.  Last A1c reviewed-   Lab Results   Component Value Date    HGBA1C 9.9 (H) 07/08/2025     Most recent fingerstick glucose reviewed-   Recent Labs   Lab 07/14/25  1640 07/14/25  2032 07/15/25  0731 07/15/25  1136   POCTGLUCOSE 230* 343* 199* 240*     Current correctional scale  Medium  Maintain anti-hyperglycemic dose as follows-   Antihyperglycemics (From admission, onward)      Start     Stop Route Frequency Ordered    07/14/25 1215  insulin glargine U-100 (Lantus) pen 10 Units         -- SubQ Daily 07/14/25 1200    07/11/25 2100  insulin glargine U-100 (Lantus) pen 13 Units         -- SubQ Nightly 07/08/25 1646    07/10/25 1722  insulin aspart U-100 pen 0-10 Units         -- SubQ Before meals & nightly PRN 07/10/25 1622          Hold Oral hypoglycemics while patient is in the hospital.  Diabetic education  Moderate dose sliding scale to promote wound healing  -Lantus dose adjusted to BID  Anemia  Anemia is likely due to acute blood loss which was from recent surgical procedure. Most recent hemoglobin and hematocrit are listed below.  Recent Labs     07/13/25  0937 07/14/25  0450 07/15/25  0538   HGB 8.1* 7.0* 7.9*   HCT 24.6* 21.6* 24.7*     Plan  - Monitor serial CBC: Daily  - Transfuse PRBC if patient becomes hemodynamically unstable, symptomatic or H/H drops below 7/21.  - Patient has received 1 units of PRBCs on 7/14/2025 for total of 3 units   - Patient's anemia is currently improved post transfusion  -iron supplementation continued  VTE Risk Mitigation (From admission, onward)           Ordered     apixaban tablet 2.5 mg  2 times daily         07/14/25 1223     IP VTE HIGH RISK PATIENT  Once         07/08/25 1052     Place sequential compression device  Until discontinued         07/08/25 1052     Reason for No Pharmacological VTE Prophylaxis  Once        Question:  Reasons:  Answer:  IV Heparin w/in 24 hrs. Pre or Post-Op    07/08/25 1052                     Discharge Planning   BRANDIE: 7/18/2025     Code Status: Full Code   Medical Readiness for Discharge Date:   Discharge Plan A: Rehab   Discharge Delays: None known at this time              Please place Justification for DME      Katie Elias NP  Department of Hospital Medicine   [facility] ([facility])

## 2025-07-15 NOTE — PLAN OF CARE
A204/A204 AYALA Lopez is a 60 y.o.male admitted on 7/8/2025 for Closed nondisplaced subtrochanteric fracture of left femur   Code Status: Full Code MRN: [id number]   Review of patient's allergies indicates:   Allergen Reactions    1 plus 1-f Rash    Sulfa (sulfonamide antibiotics) Rash     Past Medical History:   Diagnosis Date    Diabetes mellitus     Hyperlipidemia     Hypertension     EVELYN (obstructive sleep apnea)     Paroxysmal atrial fibrillation       PRN meds    0.9%  NaCl infusion (for blood administration), , Q24H PRN  0.9%  NaCl infusion (for blood administration), , Q24H PRN  0.9%  NaCl infusion (for blood administration), , Q24H PRN  0.9%  NaCl infusion (for blood administration), , Q24H PRN  acetaminophen, 650 mg, Q4H PRN  aluminum-magnesium hydroxide-simethicone, 30 mL, QID PRN  dextrose 50%, 12.5 g, PRN  dextrose 50%, 25 g, PRN  glucagon (human recombinant), 1 mg, PRN  glucose, 16 g, PRN  glucose, 24 g, PRN  hydrALAZINE, 10 mg, Q6H PRN  HYDROcodone-acetaminophen, 1 tablet, Q4H PRN  insulin aspart U-100, 0-10 Units, QID (AC + HS) PRN  melatonin, 6 mg, Nightly PRN  morphine, 4 mg, Q4H PRN  naloxone, 0.02 mg, PRN  ondansetron, 4 mg, Q12H PRN  polyethylene glycol, 17 g, BID PRN  prochlorperazine, 5 mg, Q6H PRN  promethazine, 25 mg, Q6H PRN  sodium chloride 0.9%, 10 mL, Q12H PRN      Chart check completed. Will continue plan of care. Pt oriented x4.  VSS.  Pt remained afebrile throughout this shift.   All meds administered per order.   Pt remained free of falls this shift.   Plan of care reviewed. Patient verbalizes understanding.   Pt moving/turning independently.   Bed low, side rails up x 2, wheels locked, call light in reach.   Patient instructed to call for assistance.  Patient education provided   Dressing changed on hip wound  Pain more manageable, refusing stool softeners      Orientation: oriented x 4  Tanvir Coma Scale Score: 15     Lead Monitored: Lead II Rhythm: normal sinus rhythm     Cardiac/Telemetry Box Number: [id number]  VTE Core Measure: Pharmacological prophylaxis initiated/maintained Last Bowel Movement: 07/09/25 (pt refusing stool softeners and laxatives)  Diet Consistent Carbohydrate 2000 Calories (up to 75 gm per meal); Standard Tray  Voiding Characteristics: voids spontaneously without difficulty  Peyman Score: 16  Fall Risk Score: 16  Accucheck []   Freq?      Lines/Drains/Airways       Peripheral Intravenous Line  Duration             Peripheral IV 07/11/25 1215 22 G Anterior;Right Forearm 4 days

## 2025-07-15 NOTE — PT/OT/SLP PROGRESS
Physical Therapy  Treatment    Roland Lopez   MRN: [id number]   Admitting Diagnosis: Closed nondisplaced subtrochanteric fracture of left femur    PT Received On: 07/15/25  PT Start Time: 0725     PT Stop Time: 0810    PT Total Time (min): 45 min       Billable Minutes:  Therapeutic Activity 45    Treatment Type: Treatment  PT/PTA: PTA     Number of PTA visits since last PT visit: 2       General Precautions: Standard, fall  Orthopedic Precautions: LLE toe touch weight bearing  Braces: N/A  Respiratory Status: Room air    Spiritual, Cultural Beliefs, Adventist Practices, Values that Affect Care: no    Subjective:  Completed Epic chart review prior to PT session.   Patient agreed to participate in PT session. Reports some discomfort when sitting up in chair due to arm rest putting pressure on incision site but willing to remain EOB.    Pain/Comfort  Pain Rating 1: 8/10  Location - Side 1: Left  Location 1: leg  Pain Addressed 1: Other (see comments), Nurse notified (ACTIVITY PACING)    Objective:   Patient found with: peripheral IV, telemetry    Supine > sit EOB: Mod A of 2     Forward scoot towards EOB: Mod A of 2     Seated EOB x 15 min total focusing on increased tolerance to upright position, core stability, trunk control and CV endurance.   Maintained self supported sitting balance with SBA  Maintained unsupported sitting balance with  SBA    STS from EOB > agueda RW x4 trials: Max A of 2 (consistent VC for hand placement and sequencing of task)    Standing calf raise with RLE and BUE self support on RW 2x5 reps requiring Min A to maintain standing balance    R SLS for LLE march w/ BUE self support on RW 2x5 reps (with seated break between trials) requiring Min A to maintain standing balance  Minimal clearance noted    Seated posterior scoot towards center of bed: Max A of 2    Planned to leave patient EOB to promote upright tolerance but patient reported a significant increase in pain and requested to return to  bed.    Seated lateral scoot towards HOB (R side): Mod A of 2 (noted significant difficulty with coordination of task as patient attempted to lay straight backwards when instructed to scoot)    Supine scoot towards HOB: Mod A of 2     Reviewed AROM TE to BLE including: hip flex/ext, knee flex/ext, ankle PF/DF  To be completed a minimum of 10 reps for each LE in order to promote return of function, strength and ROM.     Educated patient on importance of increased tolerance to upright position and direct impact on CV endurance and strength. Patient encouraged to sit up in chair/ EOB, for a minimum of 2 consecutive hours, 3x per day. Encouraged patient to perform AROM TE to BLE throughout the day within all available planes of motion. Re enforced importance of utilizing call light to meet needs in room and not attempt to get up without staff assistance. Patient verbalized understanding and agreed to comply.     AM-PAC 6 CLICK MOBILITY  How much help from another person does this patient currently need?   1 = Unable, Total/Dependent Assistance  2 = A lot, Maximum/Moderate Assistance  3 = A little, Minimum/Contact Guard/Supervision  4 = None, Modified [place]/Independent    Turning over in bed (including adjusting bedclothes, sheets and blankets)?: 2  Sitting down on and standing up from a chair with arms (e.g., wheelchair, bedside commode, etc.): 2  Moving from lying on back to sitting on the side of the bed?: 2  Moving to and from a bed to a chair (including a wheelchair)?: 2  Need to walk in hospital room?: 1 (NT)  Climbing 3-5 steps with a railing?: 1 (NT)  Basic Mobility Total Score: 10    AM-PAC Raw Score CMS G-Code Modifier Level of Impairment Assistance   6 % Total / Unable   7 - 9 CM 80 - 100% Maximal Assist   10 - 14 CL 60 - 80% Moderate Assist   15 - 19 CK 40 - 60% Moderate Assist   20 - 22 CJ 20 - 40% Minimal Assist   23 CI 1-20% SBA / CGA   24 CH 0% Independent/ Mod I     Patient left with bed  in chair position with call button in reach and nurse present.    Assessment:  Roland Lopez is a 60 y.o. male with a medical diagnosis of Closed nondisplaced subtrochanteric fracture of left femur and presents with overall decline in functional mobility. Patient would continue to benefit from skilled PT to address functional limitations listed below in order to return to PLOF/decrease caregiver burden. Patient requires multiple sequencing cues throughout session due to difficulty with carryover and execution of tasks.     Rehab identified problem list/impairments: weakness, impaired endurance, impaired self care skills, impaired functional mobility, gait instability, impaired balance, pain, decreased safety awareness, decreased lower extremity function, decreased upper extremity function, decreased coordination, decreased ROM, impaired cardiopulmonary response to activity, orthopedic precautions    Rehab potential is fair.    Activity tolerance: Fair    Discharge recommendations: High Intensity Therapy      Barriers to discharge:      Equipment recommendations: to be determined by next level of care     GOALS:   Multidisciplinary Problems       Physical Therapy Goals          Problem: Physical Therapy    Goal Priority Disciplines Outcome Interventions   Physical Therapy Goal     PT, PT/OT Progressing    Description: Patient will be seen a minimal of 3 out of 7 days a week.    LTG to be met by 07/27/25:     Bed mobility: Min A  Transfers: Min A with RW  Gait: Min A with RW x20 feet                        DME Justifications:  No DME recommended requiring DME justifications    PLAN:    Patient to be seen 3 x/week to address the above listed problems via therapeutic activities, therapeutic exercises, neuromuscular re-education, wheelchair management/training  Plan of Care expires: 07/27/25  Plan of Care reviewed with: patient         07/15/2025

## 2025-07-15 NOTE — SUBJECTIVE & OBJECTIVE
Interval History:  Patient in bed upon exam with RLE edema noted.  H/H improved post transfusion.  Rehab placement in progress.    Review of Systems   Constitutional:  Positive for activity change. Negative for appetite change and fatigue.   HENT: Negative.     Respiratory: Negative.     Cardiovascular:  Positive for leg swelling.   Gastrointestinal: Negative.    Genitourinary: Negative.    Musculoskeletal:  Positive for gait problem and myalgias.   Skin:  Positive for wound.   Psychiatric/Behavioral: Negative.       Objective:     Vital Signs (Most Recent):  Temp: 97.9 °F (36.6 °C) (07/15/25 1049)  Pulse: 73 (07/15/25 1049)  Resp: 18 (07/15/25 1002)  BP: (!) 162/76 (07/15/25 1049)  SpO2: 99 % (07/15/25 1049) Vital Signs (24h Range):  Temp:  [97.7 °F (36.5 °C)-98.8 °F (37.1 °C)] 97.9 °F (36.6 °C)  Pulse:  [63-81] 73  Resp:  [18-22] 18  SpO2:  [93 %-99 %] 99 %  BP: (141-169)/(69-78) 162/76     Weight:  (Weight not measured, foot of bed is detatched.)  Body mass index is 42.94 kg/m².    Intake/Output Summary (Last 24 hours) at 7/15/2025 1426  Last data filed at 7/15/2025 0450  Gross per 24 hour   Intake 368.75 ml   Output 650 ml   Net -281.25 ml         Physical Exam  Constitutional:       Appearance: He is obese.   HENT:      Mouth/Throat:      Mouth: Mucous membranes are moist.      Pharynx: Oropharynx is clear.   Cardiovascular:      Rate and Rhythm: Normal rate and regular rhythm.      Pulses: Normal pulses.   Pulmonary:      Effort: Pulmonary effort is normal.      Comments: Decreased bibasilar  Abdominal:      General: Bowel sounds are normal.      Palpations: Abdomen is soft.   Musculoskeletal:         General: Swelling (LLE) and tenderness (LLE) present.   Skin:     General: Skin is warm and dry.      Comments: Surgical incision to Left thigh w/ serous drainage noted    Neurological:      Mental Status: He is alert and oriented to person, place, and time.   Psychiatric:         Mood and Affect: Mood normal.                Significant Labs: All pertinent labs within the past 24 hours have been reviewed.    Significant Imaging: I have reviewed all pertinent imaging results/findings within the past 24 hours.

## 2025-07-15 NOTE — ASSESSMENT & PLAN NOTE
MELD 3.0: 8 at 7/10/2025  5:13 AM  MELD-Na: 8 at 7/10/2025  5:13 AM  Calculated from:  Serum Creatinine: 0.7 mg/dL (Using min of 1 mg/dL) at 7/10/2025  5:13 AM  Serum Sodium: 137 mmol/L at 7/10/2025  5:13 AM  Total Bilirubin: 1.2 mg/dL at 7/10/2025  5:13 AM  Serum Albumin: 3.1 g/dL at 7/10/2025  5:13 AM  INR(ratio): 1.1 at 7/8/2025  7:39 AM  Age at listing (hypothetical): 60 years  Sex: Male at 7/10/2025  5:13 AM       Continue chronic meds. Etiology likely ETOH. Will avoid any hepatotoxic meds, and monitor CBC/CMP/INR for synthetic function.

## 2025-07-15 NOTE — PLAN OF CARE
SW meet with Patient at bedside upon request regarding Long Term Disability Paperwork via patient's employer. SW stated that she could look into a MD assisting with paperwork. Patient stated orthopedics PA may be able to assist but did not know how to get in touch with him. SW stated she could send a secure chat and follow up this afternoon. Patient verbalized understanding.    SASHA sent secure chat to NICKY Kiser for guidance to paperwork. Per NICKY Kiser, he can get paperwork form Patient and assist filling it out.     SW will continue to follow and assist as needed.

## 2025-07-15 NOTE — PROGRESS NOTES
[facility])  Orthopedics  Progress Note    Patient Name: Roland Lopez  MRN: [id number]  Admission Date: 7/8/2025  Hospital Length of Stay: 7 days  Attending Provider: Melanie Torres MD  Primary Care Provider: Emilee Primary Doctor  Follow-up For: Procedure(s) (LRB):  INSERTION, INTRAMEDULLARY COOKIE, FEMUR, TROCHANTER (Left)    Post-Operative Day: 5 Days Post-Op  Subjective:     Principal Problem:Closed nondisplaced subtrochanteric fracture of left femur    Principal Orthopedic Problem:  Left subtrochanteric femur fracture    Interval History: Roland Lopez is a 60 y.o. male postop day 5 status post ORIF of left subtrochanteric femur fracture.  Patient is sitting on the edge of the bed.  Denies numbness or tingling in the extremity.  No new complaints at this time.    Review of patient's allergies indicates:   Allergen Reactions    1 plus 1-f Rash    Sulfa (sulfonamide antibiotics) Rash       Current Facility-Administered Medications   Medication    0.9%  NaCl infusion (for blood administration)    0.9%  NaCl infusion (for blood administration)    0.9%  NaCl infusion (for blood administration)    0.9%  NaCl infusion (for blood administration)    acetaminophen tablet 650 mg    aluminum-magnesium hydroxide-simethicone 200-200-20 mg/5 mL suspension 30 mL    amLODIPine tablet 5 mg    apixaban tablet 2.5 mg    atorvastatin tablet 20 mg    carvediloL tablet 6.25 mg    dextrose 50% injection 12.5 g    dextrose 50% injection 25 g    ferrous sulfate tablet 1 each    flecainide tablet 50 mg    glucagon (human recombinant) injection 1 mg    glucose chewable tablet 16 g    glucose chewable tablet 24 g    hydrALAZINE injection 10 mg    HYDROcodone-acetaminophen 5-325 mg per tablet 1 tablet    insulin aspart U-100 pen 0-10 Units    insulin glargine U-100 (Lantus) pen 10 Units    insulin glargine U-100 (Lantus) pen 13 Units    melatonin tablet 6 mg    morphine injection 4 mg    naloxone 0.4 mg/mL injection 0.02  "mg    ondansetron injection 4 mg    polyethylene glycol packet 17 g    prochlorperazine injection Soln 5 mg    promethazine suppository 25 mg    sodium chloride 0.9% flush 10 mL     Objective:     Vital Signs (Most Recent):  Temp: 98.4 °F (36.9 °C) (07/15/25 1624)  Pulse: 78 (07/15/25 1624)  Resp: 20 (07/15/25 1624)  BP: (!) 155/70 (07/15/25 1624)  SpO2: 98 % (07/15/25 1624) Vital Signs (24h Range):  Temp:  [97.7 °F (36.5 °C)-98.8 °F (37.1 °C)] 98.4 °F (36.9 °C)  Pulse:  [63-81] 78  Resp:  [18-22] 20  SpO2:  [93 %-99 %] 98 %  BP: (141-169)/(69-77) 155/70     Weight:  (Weight not measured, foot of bed is detatched.)  Height: 6' 2" (188 cm)  Body mass index is 42.94 kg/m².      Intake/Output Summary (Last 24 hours) at 7/15/2025 1659  Last data filed at 7/15/2025 1624  Gross per 24 hour   Intake --   Output 1675 ml   Net -1675 ml       Ortho/SPM Exam  Left lower extremity:   Dressings are in place with mild amount of serosanguineous drainage   No evidence of infection   Moderate edema of the thigh   Moderate TTP around the incision   Calf and compartments are soft and compressible   Motor exam normal   Sensation and pulses intact   Cap refill brisk    GEN: Well developed, well nourished male. AAOX3. No acute distress.   Head: Normocephalic, atraumatic.   Eyes: CHEY  Neck: Trachea is midline, no adenopathy  Resp: Breathing unlabored.  Neuro: Motor function normal, Cranial nerves intact  Psych: Mood and affect appropriate.      Significant Labs:   Recent Lab Results         07/15/25  1136   07/15/25  0731   07/15/25  0538   07/14/25 2032        Albumin     2.2         ALP     62         ALT     30         Anion Gap     4         AST     29         Baso #     0.02         Basophil %     0.4         BILIRUBIN TOTAL     1.5  Comment: For infants and newborns, interpretation of results should be based   on gestational age, weight and in agreement with clinical   observations.    Premature Infant recommended reference " ranges:   0-24 hours:  <8.0 mg/dL   24-48 hours: <12.0 mg/dL   3-5 days:    <15.0 mg/dL   6-29 days:   <15.0 mg/dL         BUN     19         Calcium     8.6         Chloride     105         CO2     28         Creatinine     0.7         eGFR     >60  Comment: Estimated GFR calculated using the CKD-EPI creatinine (2021) equation.         Eos #     0.13         Eos %     2.4         Glucose     228         Gran # (ANC)     3.64         Hematocrit     24.7         Hemoglobin     7.9         Immature Grans (Abs)     0.05  Comment: Mild elevation in immature granulocytes is non specific and can be seen in a variety of conditions including stress response, acute inflammation, trauma and pregnancy. Correlation with other laboratory and clinical findings is essential.         Immature Granulocytes     0.9         Lymph #     0.98         Lymph %     18.2         Magnesium      1.8         MCH     28.4         MCHC     32.0         MCV     89         Mono #     0.56         Mono %     10.4         MPV     9.6         Neut %     67.7         nRBC     0         Phosphorus Level     2.9         Platelet Count     199         POCT Glucose 240   199     343       Potassium     4.3         PROTEIN TOTAL     5.7         RBC     2.78         RDW     14.5         Sodium     137         WBC     5.38               All pertinent labs within the past 24 hours have been reviewed.    Significant Imaging: I have reviewed and interpreted all pertinent imaging results/findings.    Assessment/Plan:     Active Diagnoses:    Diagnosis Date Noted POA    PRINCIPAL PROBLEM:  Closed nondisplaced subtrochanteric fracture of left femur [S72.25XA] 07/08/2025 Yes    Anemia [D64.9] 07/14/2025 Yes    Insulin dependent type 2 diabetes mellitus [E11.9, Z79.4] 10/09/2024 Not Applicable    EVELYN on CPAP [G47.33] 10/09/2024 Yes    Primary hypertension [I10] 10/09/2024 Yes    PAF (paroxysmal atrial fibrillation) [I48.0] 10/09/2024 Yes    Liver cirrhosis [K74.60]  10/09/2024 Yes      Problems Resolved During this Admission:       Assessment:  60 y.o. male postop day 5 status post ORIF of left subtrochanteric femur fracture    Plan:  Toe-touch weight-bearing to the left lower extremity   PT/OT for gait training and ADLs   DVT prophylaxis per primary team   Patient would like to go to inpatient rehab/skilled nursing facility following hospital discharge   He is ready for discharge from orthopedic standpoint once these arrangements have been made   We will see him back in Ortho Trauma Clinic around 2 weeks postop    Shiraz Judd PA-C  Orthopedics  [facility] - Telemetry ([facility])

## 2025-07-16 VITALS
SYSTOLIC BLOOD PRESSURE: 175 MMHG | RESPIRATION RATE: 18 BRPM | HEIGHT: 74 IN | WEIGHT: 315 LBS | OXYGEN SATURATION: 99 % | TEMPERATURE: 98 F | HEART RATE: 73 BPM | BODY MASS INDEX: 40.43 KG/M2 | DIASTOLIC BLOOD PRESSURE: 79 MMHG

## 2025-07-16 LAB
ABSOLUTE EOSINOPHIL (OHS): 0.12 K/UL
ABSOLUTE MONOCYTE (OHS): 0.51 K/UL (ref 0.3–1)
ABSOLUTE NEUTROPHIL COUNT (OHS): 4.26 K/UL (ref 1.8–7.7)
ALBUMIN SERPL BCP-MCNC: 2.2 G/DL (ref 3.5–5.2)
ALP SERPL-CCNC: 64 UNIT/L (ref 40–150)
ALT SERPL W/O P-5'-P-CCNC: 28 UNIT/L (ref 10–44)
ANION GAP (OHS): 6 MMOL/L (ref 8–16)
AST SERPL-CCNC: 23 UNIT/L (ref 11–45)
BASOPHILS # BLD AUTO: 0.03 K/UL
BASOPHILS NFR BLD AUTO: 0.5 %
BILIRUB SERPL-MCNC: 1.5 MG/DL (ref 0.1–1)
BUN SERPL-MCNC: 18 MG/DL (ref 6–20)
CALCIUM SERPL-MCNC: 8.6 MG/DL (ref 8.7–10.5)
CHLORIDE SERPL-SCNC: 105 MMOL/L (ref 95–110)
CO2 SERPL-SCNC: 26 MMOL/L (ref 23–29)
CREAT SERPL-MCNC: 0.7 MG/DL (ref 0.5–1.4)
ERYTHROCYTE [DISTWIDTH] IN BLOOD BY AUTOMATED COUNT: 14.4 % (ref 11.5–14.5)
GFR SERPLBLD CREATININE-BSD FMLA CKD-EPI: >60 ML/MIN/1.73/M2
GLUCOSE SERPL-MCNC: 184 MG/DL (ref 70–110)
HCT VFR BLD AUTO: 25 % (ref 40–54)
HGB BLD-MCNC: 8 GM/DL (ref 14–18)
IMM GRANULOCYTES # BLD AUTO: 0.03 K/UL (ref 0–0.04)
IMM GRANULOCYTES NFR BLD AUTO: 0.5 % (ref 0–0.5)
LYMPHOCYTES # BLD AUTO: 1 K/UL (ref 1–4.8)
MAGNESIUM SERPL-MCNC: 1.7 MG/DL (ref 1.6–2.6)
MCH RBC QN AUTO: 28.3 PG (ref 27–31)
MCHC RBC AUTO-ENTMCNC: 32 G/DL (ref 32–36)
MCV RBC AUTO: 88 FL (ref 82–98)
NUCLEATED RBC (/100WBC) (OHS): 0 /100 WBC
PHOSPHATE SERPL-MCNC: 3.1 MG/DL (ref 2.7–4.5)
PLATELET # BLD AUTO: 207 K/UL (ref 150–450)
PMV BLD AUTO: 9.1 FL (ref 9.2–12.9)
POCT GLUCOSE: 190 MG/DL (ref 70–110)
POCT GLUCOSE: 196 MG/DL (ref 70–110)
POTASSIUM SERPL-SCNC: 4 MMOL/L (ref 3.5–5.1)
PROT SERPL-MCNC: 5.7 GM/DL (ref 6–8.4)
RBC # BLD AUTO: 2.83 M/UL (ref 4.6–6.2)
RELATIVE EOSINOPHIL (OHS): 2 %
RELATIVE LYMPHOCYTE (OHS): 16.8 % (ref 18–48)
RELATIVE MONOCYTE (OHS): 8.6 % (ref 4–15)
RELATIVE NEUTROPHIL (OHS): 71.6 % (ref 38–73)
SODIUM SERPL-SCNC: 137 MMOL/L (ref 136–145)
WBC # BLD AUTO: 5.95 K/UL (ref 3.9–12.7)

## 2025-07-16 PROCEDURE — 94761 N-INVAS EAR/PLS OXIMETRY MLT: CPT

## 2025-07-16 PROCEDURE — 99024 POSTOP FOLLOW-UP VISIT: CPT | Mod: ,,, | Performed by: PHYSICIAN ASSISTANT

## 2025-07-16 PROCEDURE — 97530 THERAPEUTIC ACTIVITIES: CPT

## 2025-07-16 PROCEDURE — 85025 COMPLETE CBC W/AUTO DIFF WBC: CPT | Performed by: STUDENT IN AN ORGANIZED HEALTH CARE EDUCATION/TRAINING PROGRAM

## 2025-07-16 PROCEDURE — 25000003 PHARM REV CODE 250: Performed by: ORTHOPAEDIC SURGERY

## 2025-07-16 PROCEDURE — 25000003 PHARM REV CODE 250: Performed by: NURSE PRACTITIONER

## 2025-07-16 PROCEDURE — 36415 COLL VENOUS BLD VENIPUNCTURE: CPT | Performed by: STUDENT IN AN ORGANIZED HEALTH CARE EDUCATION/TRAINING PROGRAM

## 2025-07-16 PROCEDURE — 84100 ASSAY OF PHOSPHORUS: CPT | Performed by: STUDENT IN AN ORGANIZED HEALTH CARE EDUCATION/TRAINING PROGRAM

## 2025-07-16 PROCEDURE — 83735 ASSAY OF MAGNESIUM: CPT | Performed by: STUDENT IN AN ORGANIZED HEALTH CARE EDUCATION/TRAINING PROGRAM

## 2025-07-16 PROCEDURE — 63600175 PHARM REV CODE 636 W HCPCS: Performed by: NURSE PRACTITIONER

## 2025-07-16 PROCEDURE — 80053 COMPREHEN METABOLIC PANEL: CPT | Performed by: STUDENT IN AN ORGANIZED HEALTH CARE EDUCATION/TRAINING PROGRAM

## 2025-07-16 PROCEDURE — 97535 SELF CARE MNGMENT TRAINING: CPT

## 2025-07-16 RX ORDER — POLYETHYLENE GLYCOL 3350 17 G/17G
17 POWDER, FOR SOLUTION ORAL 2 TIMES DAILY PRN
Start: 2025-07-16

## 2025-07-16 RX ORDER — FERROUS SULFATE 325(65) MG
325 TABLET, DELAYED RELEASE (ENTERIC COATED) ORAL DAILY
Start: 2025-07-16 | End: 2025-08-15

## 2025-07-16 RX ORDER — INSULIN ASPART 100 [IU]/ML
0-10 INJECTION, SOLUTION INTRAVENOUS; SUBCUTANEOUS
Start: 2025-07-16 | End: 2025-08-15

## 2025-07-16 RX ORDER — HYDROCODONE BITARTRATE AND ACETAMINOPHEN 5; 325 MG/1; MG/1
1 TABLET ORAL EVERY 12 HOURS PRN
Start: 2025-07-16 | End: 2025-07-21

## 2025-07-16 RX ORDER — INSULIN GLARGINE 100 [IU]/ML
15 INJECTION, SOLUTION SUBCUTANEOUS 2 TIMES DAILY
Start: 2025-07-16 | End: 2025-08-15

## 2025-07-16 RX ORDER — BISACODYL 10 MG/1
10 SUPPOSITORY RECTAL ONCE
Status: CANCELLED | OUTPATIENT
Start: 2025-07-16

## 2025-07-16 RX ORDER — INSULIN GLARGINE 100 [IU]/ML
15 INJECTION, SOLUTION SUBCUTANEOUS 2 TIMES DAILY
Status: DISCONTINUED | OUTPATIENT
Start: 2025-07-16 | End: 2025-07-16 | Stop reason: HOSPADM

## 2025-07-16 RX ADMIN — CARVEDILOL 6.25 MG: 6.25 TABLET, FILM COATED ORAL at 08:07

## 2025-07-16 RX ADMIN — HYDRALAZINE HYDROCHLORIDE 10 MG: 20 INJECTION INTRAMUSCULAR; INTRAVENOUS at 03:07

## 2025-07-16 RX ADMIN — FERROUS SULFATE TAB 325 MG (65 MG ELEMENTAL FE) 1 EACH: 325 (65 FE) TAB at 08:07

## 2025-07-16 RX ADMIN — INSULIN GLARGINE 10 UNITS: 100 INJECTION, SOLUTION SUBCUTANEOUS at 08:07

## 2025-07-16 RX ADMIN — APIXABAN 2.5 MG: 2.5 TABLET, FILM COATED ORAL at 08:07

## 2025-07-16 RX ADMIN — POLYETHYLENE GLYCOL 3350 17 G: 17 POWDER, FOR SOLUTION ORAL at 12:07

## 2025-07-16 RX ADMIN — AMLODIPINE BESYLATE 5 MG: 5 TABLET ORAL at 08:07

## 2025-07-16 RX ADMIN — FLECAINIDE ACETATE 50 MG: 50 TABLET ORAL at 08:07

## 2025-07-16 RX ADMIN — INSULIN ASPART 2 UNITS: 100 INJECTION, SOLUTION INTRAVENOUS; SUBCUTANEOUS at 06:07

## 2025-07-16 RX ADMIN — ATORVASTATIN CALCIUM 20 MG: 10 TABLET, FILM COATED ORAL at 08:07

## 2025-07-16 RX ADMIN — HYDROCODONE BITARTRATE AND ACETAMINOPHEN 1 TABLET: 5; 325 TABLET ORAL at 06:07

## 2025-07-16 RX ADMIN — INSULIN ASPART 2 UNITS: 100 INJECTION, SOLUTION INTRAVENOUS; SUBCUTANEOUS at 12:07

## 2025-07-16 NOTE — PLAN OF CARE
07/16/25 1020 07/16/25 1118   Post-Acute Status   Post-Acute Authorization Placement  --    Post-Acute Placement Status Set-up Complete/Auth obtained  --    Discharge Delays None known at this time  --    Discharge Plan   Discharge Plan A Rehab  --        [facility] Edie[facility] with [facility], Patient has insurance authorization for [facility]. Per Edie, she will get with their UR RN and send the authorization to  Rehab.   SASHA provided Edie with  rehab fax number.    Per Gerri, they will be on the look out for the authorization.

## 2025-07-16 NOTE — ASSESSMENT & PLAN NOTE
Patient's FSGs are controlled on current medication regimen.  Last A1c reviewed-   Lab Results   Component Value Date    HGBA1C 9.9 (H) 07/08/2025     Most recent fingerstick glucose reviewed-   Recent Labs   Lab 07/15/25  1806 07/15/25  2051 07/16/25  0615 07/16/25  1115   POCTGLUCOSE 185* 180* 190* 196*     Current correctional scale  Medium  Maintain anti-hyperglycemic dose as follows-   Antihyperglycemics (From admission, onward)      Start     Stop Route Frequency Ordered    07/16/25 2100  insulin glargine U-100 (Lantus) pen 15 Units         -- SubQ 2 times daily 07/16/25 0821    07/10/25 1722  insulin aspart U-100 pen 0-10 Units         -- SubQ Before meals & nightly PRN 07/10/25 1622          Hold Oral hypoglycemics while patient is in the hospital.  Diabetic education  Moderate dose sliding scale to promote wound healing  -Lantus dose adjusted to BID- dose increased to 15 units BID

## 2025-07-16 NOTE — SUBJECTIVE & OBJECTIVE
Interval History:  Patient in bed upon exam with pain to LLE controlled in no acute distress.  H&H stable with no transfusion required.  Inpatient rehab placement in progress.  Hyperglycemia noted with insulin adjusted.    Review of Systems   Constitutional:  Positive for activity change. Negative for appetite change and fatigue.   HENT: Negative.     Respiratory: Negative.     Cardiovascular:  Positive for leg swelling.   Gastrointestinal: Negative.    Genitourinary: Negative.    Musculoskeletal:  Positive for gait problem and myalgias.   Skin:  Positive for wound.   Psychiatric/Behavioral: Negative.       Objective:     Vital Signs (Most Recent):  Temp: 97.4 °F (36.3 °C) (07/16/25 0735)  Pulse: 67 (07/16/25 0840)  Resp: 18 (07/16/25 0840)  BP: (!) 155/76 (07/16/25 0814)  SpO2: 99 % (07/16/25 0840) Vital Signs (24h Range):  Temp:  [97.4 °F (36.3 °C)-98.6 °F (37 °C)] 97.4 °F (36.3 °C)  Pulse:  [67-80] 67  Resp:  [18-20] 18  SpO2:  [94 %-99 %] 99 %  BP: (138-165)/(65-79) 155/76     Weight:  (Weight not measured, foot of bed is detatched.)  Body mass index is 42.94 kg/m².    Intake/Output Summary (Last 24 hours) at 7/16/2025 1140  Last data filed at 7/16/2025 1040  Gross per 24 hour   Intake 480 ml   Output 1420 ml   Net -940 ml         Physical Exam  Constitutional:       Appearance: He is obese.   HENT:      Mouth/Throat:      Mouth: Mucous membranes are moist.      Pharynx: Oropharynx is clear.   Cardiovascular:      Rate and Rhythm: Normal rate and regular rhythm.      Pulses: Normal pulses.   Pulmonary:      Effort: Pulmonary effort is normal.      Comments: Decreased bibasilar  Abdominal:      General: Bowel sounds are normal.      Palpations: Abdomen is soft.   Musculoskeletal:         General: Swelling (LLE) and tenderness (LLE) present.   Skin:     General: Skin is warm and dry.      Comments: Surgical incision to Left thigh w/ serous drainage noted    Neurological:      Mental Status: He is alert and  oriented to person, place, and time.   Psychiatric:         Mood and Affect: Mood normal.               Significant Labs: All pertinent labs within the past 24 hours have been reviewed.    Significant Imaging: I have reviewed all pertinent imaging results/findings within the past 24 hours.

## 2025-07-16 NOTE — ASSESSMENT & PLAN NOTE
Patient's blood pressure range in the last 24 hours was: BP  Min: 138/65  Max: 165/79.The patient's inpatient anti-hypertensive regimen is listed below:  Current Antihypertensives  carvediloL tablet 6.25 mg, 2 times daily with meals, Oral  hydrALAZINE injection 10 mg, Every 6 hours PRN, Intravenous  amLODIPine tablet 5 mg, Daily, Oral    Plan  - BP is improved- current plan of care improved

## 2025-07-16 NOTE — PROGRESS NOTES
[facility])  Orthopedics  Progress Note    Patient Name: Rloand Lopez  MRN: [id number]  Admission Date: 7/8/2025  Hospital Length of Stay: 8 days  Attending Provider: Melanie Torres MD  Primary Care Provider: Emilee Primary Doctor  Follow-up For: Procedure(s) (LRB):  INSERTION, INTRAMEDULLARY COOKIE, FEMUR, TROCHANTER (Left)    Post-Operative Day: 6 Days Post-Op  Subjective:     Principal Problem:Closed nondisplaced subtrochanteric fracture of left femur    Principal Orthopedic Problem:  Left subtrochanteric femur fracture    Interval History: Roland Lopez is a 60 y.o. male postop day 6 status post ORIF of left subtrochanteric femur fracture.  Patient is resting comfortably in bed.  Denies numbness or tingling in the extremity.  No new complaints at this time.    Review of patient's allergies indicates:   Allergen Reactions    1 plus 1-f Rash    Sulfa (sulfonamide antibiotics) Rash       Current Facility-Administered Medications   Medication    0.9%  NaCl infusion (for blood administration)    0.9%  NaCl infusion (for blood administration)    0.9%  NaCl infusion (for blood administration)    0.9%  NaCl infusion (for blood administration)    acetaminophen tablet 650 mg    aluminum-magnesium hydroxide-simethicone 200-200-20 mg/5 mL suspension 30 mL    amLODIPine tablet 5 mg    apixaban tablet 2.5 mg    atorvastatin tablet 20 mg    carvediloL tablet 6.25 mg    dextrose 50% injection 12.5 g    dextrose 50% injection 25 g    ferrous sulfate tablet 1 each    flecainide tablet 50 mg    glucagon (human recombinant) injection 1 mg    glucose chewable tablet 16 g    glucose chewable tablet 24 g    hydrALAZINE injection 10 mg    HYDROcodone-acetaminophen 5-325 mg per tablet 1 tablet    insulin aspart U-100 pen 0-10 Units    insulin glargine U-100 (Lantus) pen 15 Units    melatonin tablet 6 mg    morphine injection 4 mg    naloxone 0.4 mg/mL injection 0.02 mg    ondansetron injection 4 mg    polyethylene  "glycol packet 17 g    prochlorperazine injection Soln 5 mg    promethazine suppository 25 mg    sodium chloride 0.9% flush 10 mL     Objective:     Vital Signs (Most Recent):  Temp: 97.4 °F (36.3 °C) (07/16/25 0735)  Pulse: 67 (07/16/25 0840)  Resp: 18 (07/16/25 0840)  BP: (!) 155/76 (07/16/25 0814)  SpO2: 99 % (07/16/25 0840) Vital Signs (24h Range):  Temp:  [97.4 °F (36.3 °C)-98.6 °F (37 °C)] 97.4 °F (36.3 °C)  Pulse:  [67-80] 67  Resp:  [18-20] 18  SpO2:  [94 %-99 %] 99 %  BP: (138-165)/(65-79) 155/76     Weight:  (Weight not measured, foot of bed is detatched.)  Height: 6' 2" (188 cm)  Body mass index is 42.94 kg/m².      Intake/Output Summary (Last 24 hours) at 7/16/2025 1057  Last data filed at 7/16/2025 0805  Gross per 24 hour   Intake 240 ml   Output 1745 ml   Net -1505 ml       Ortho/SPM Exam  Left lower extremity:   Dressings are in place with mild amount of serosanguineous drainage   No evidence of infection   Moderate edema of the thigh   Moderate TTP around the incision   Calf and compartments are soft and compressible   Motor exam normal   Sensation and pulses intact   Cap refill brisk    GEN: Well developed, well nourished male. AAOX3. No acute distress.   Head: Normocephalic, atraumatic.   Eyes: CHEY  Neck: Trachea is midline, no adenopathy  Resp: Breathing unlabored.  Neuro: Motor function normal, Cranial nerves intact  Psych: Mood and affect appropriate.      Significant Labs:   Recent Lab Results  (Last 5 results in the past 24 hours)        07/16/25  0615   07/16/25  0506   07/15/25  2051   07/15/25  1806   07/15/25  1136        Albumin   2.2             ALP   64             ALT   28             Anion Gap   6             AST   23             Baso #   0.03             Basophil %   0.5             BILIRUBIN TOTAL   1.5  Comment: For infants and newborns, interpretation of results should be based   on gestational age, weight and in agreement with clinical   observations.    Premature Infant " recommended reference ranges:   0-24 hours:  <8.0 mg/dL   24-48 hours: <12.0 mg/dL   3-5 days:    <15.0 mg/dL   6-29 days:   <15.0 mg/dL             BUN   18             Calcium   8.6             Chloride   105             CO2   26             Creatinine   0.7             eGFR   >60  Comment: Estimated GFR calculated using the CKD-EPI creatinine (2021) equation.             Eos #   0.12             Eos %   2.0             Glucose   184             Gran # (ANC)   4.26             Hematocrit   25.0             Hemoglobin   8.0             Immature Grans (Abs)   0.03  Comment: Mild elevation in immature granulocytes is non specific and can be seen in a variety of conditions including stress response, acute inflammation, trauma and pregnancy. Correlation with other laboratory and clinical findings is essential.             Immature Granulocytes   0.5             Lymph #   1.00             Lymph %   16.8             Magnesium    1.7             MCH   28.3             MCHC   32.0             MCV   88             Mono #   0.51             Mono %   8.6             MPV   9.1             Neut %   71.6             nRBC   0             Phosphorus Level   3.1             Platelet Count   207             POCT Glucose 190     180   185   240       Potassium   4.0             PROTEIN TOTAL   5.7             RBC   2.83             RDW   14.4             Sodium   137             WBC   5.95                                  All pertinent labs within the past 24 hours have been reviewed.    Significant Imaging: I have reviewed and interpreted all pertinent imaging results/findings.    Assessment/Plan:     Active Diagnoses:    Diagnosis Date Noted POA    PRINCIPAL PROBLEM:  Closed nondisplaced subtrochanteric fracture of left femur [S72.25XA] 07/08/2025 Yes    Anemia [D64.9] 07/14/2025 Yes    Insulin dependent type 2 diabetes mellitus [E11.9, Z79.4] 10/09/2024 Not Applicable    EVELYN on CPAP [G47.33] 10/09/2024 Yes    Primary hypertension  [I10] 10/09/2024 Yes    PAF (paroxysmal atrial fibrillation) [I48.0] 10/09/2024 Yes    Liver cirrhosis [K74.60] 10/09/2024 Yes      Problems Resolved During this Admission:       Assessment:  60 y.o. male postop day 6 status post ORIF of left subtrochanteric femur fracture    Plan:  Toe-touch weight-bearing to the left lower extremity   PT/OT for gait training and ADLs   DVT prophylaxis per primary team   Patient would like to go to inpatient rehab/skilled nursing facility following hospital discharge   He is ready for discharge from orthopedic standpoint once these arrangements have been made   We will see him back in Ortho Trauma Clinic around 2 weeks postop    Shiraz Judd PA-C  Orthopedics  [facility] - Telemetry ([facility])

## 2025-07-16 NOTE — ASSESSMENT & PLAN NOTE
Patient had a fall at home PTA, xray left femur: There is an acute appearing fracture in the intertrochanteric portion of the left femur.     Status post intramedullary maria t insertion per ortho  PT/OT consulted with evaluation completed and Rehab recommended   -case management assisting with discharge planning- placement inpatient rehab in progress  Orthopedic surgery consulted, follow-up recs

## 2025-07-16 NOTE — PT/OT/SLP PROGRESS
Physical Therapy  Treatment    Roland Lopez   MRN: [id number]   Admitting Diagnosis: Closed nondisplaced subtrochanteric fracture of left femur       PT Start Time: 1041     PT Stop Time: 1106    PT Total Time (min): 25 min       Billable Minutes:  Therapeutic Activity 25    Treatment Type: Treatment  PT/PTA: PT     Number of PTA visits since last PT visit: 0       General Precautions: Standard, fall  Orthopedic Precautions: LLE toe touch weight bearing  Braces: N/A  Respiratory Status: Room air    Spiritual, Cultural Beliefs, Mormonism Practices, Values that Affect Care: no    Subjective:  Communicated with nursing (Priyanka) and performed chart review via epic system prior to session.  Pt agreeable to PT services with education and encouragement    Pain/Comfort  Pain Rating 1: 2/10  Pain Rating Post-Intervention 1: 2/10    Objective:   Patient found with: peripheral IV, telemetry, pt supine in bed upon PT arrival    Functional Mobility:  Bed Mobility:    Supine to sit mod A x 2   Seated scooting mod A x 2   Tolerated sitting EOB x 8 mins with SBA    Transfers:   Facilitated squat pivot transfer with mod A x 2    Gait:    Unable, limited by body habitus, fear and TTWB at this time    Balance:   Dynamic Sit: FAIR+: Maintains balance through MINIMAL excursions of active trunk motion  Dynamic stand: poor minus dynamic standing balance      Treatment and Education:  Educated pt on benefits of consistent participation in PT services to meet functional goals. Educated pt on seated therex to promote strength, circulation and joint mobility. Exercises included AP, LAQ. Educated to perform exercises intermittently throughout day to tolerance. Educated pt on importance of sitting OOB to promote endurance and overall activity tolerance. Educated pt on call don't fall policy and use of call button to alert nursing staff of needs (including to assist in/out of bed). Pt expressed understanding.      AM-PAC 6 CLICK MOBILITY  How  much help from another person does this patient currently need?   1 = Unable, Total/Dependent Assistance  2 = A lot, Maximum/Moderate Assistance  3 = A little, Minimum/Contact Guard/Supervision  4 = None, Modified [place]/Independent    Turning over in bed (including adjusting bedclothes, sheets and blankets)?: 2  Sitting down on and standing up from a chair with arms (e.g., wheelchair, bedside commode, etc.): 2  Moving from lying on back to sitting on the side of the bed?: 2  Moving to and from a bed to a chair (including a wheelchair)?: 2  Need to walk in hospital room?: 1  Climbing 3-5 steps with a railing?: 1  Basic Mobility Total Score: 10    AM-PAC Raw Score CMS G-Code Modifier Level of Impairment Assistance   6 % Total / Unable   7 - 9 CM 80 - 100% Maximal Assist   10 - 14 CL 60 - 80% Moderate Assist   15 - 19 CK 40 - 60% Moderate Assist   20 - 22 CJ 20 - 40% Minimal Assist   23 CI 1-20% SBA / CGA   24 CH 0% Independent/ Mod I     Patient left up in chair with all lines intact, call button in reach, chair alarm on, nursing notified, and nursing present.    Assessment:  Roland Lopez is a 60 y.o. male with a medical diagnosis of Closed nondisplaced subtrochanteric fracture of left femur and presents with deficits in overall mobility and increased risk of falls as well as issues related to immobility. Pt was able to tolerate EOB/OOB activity with mod A x 2. Pt was able to tolerate TTWB with transfer and seated scooting. Pt will benefit from continued PT services in order to progress toward baseline.    Rehab identified problem list/impairments: weakness, impaired endurance, impaired functional mobility, gait instability, impaired balance, decreased coordination, decreased lower extremity function, decreased safety awareness, pain    Rehab potential is good.    Activity tolerance: Fair    Discharge recommendations: High Intensity Therapy      Barriers to discharge:      Equipment recommendations:  to be determined by next level of care     GOALS:   Multidisciplinary Problems       Physical Therapy Goals          Problem: Physical Therapy    Goal Priority Disciplines Outcome Interventions   Physical Therapy Goal     PT, PT/OT Progressing    Description: Patient will be seen a minimal of 3 out of 7 days a week.    LTG to be met by 07/27/25:     Bed mobility: Min A  Transfers: Min A with RW  Gait: Min A with RW x20 feet                        DME Justifications:  TBD    PLAN:    Patient to be seen 3 x/week to address the above listed problems via gait training, therapeutic activities, therapeutic exercises, neuromuscular re-education  Plan of Care expires: 07/27/25  Plan of Care reviewed with: patient         07/16/2025

## 2025-07-16 NOTE — PROGRESS NOTES
Pt refusing CPAP tonight. Pt states that machine is too loud and he cannot sleep with uit on. T requested to wear NC instead. Pt placed on NC @ 3L. Pt educated on how to call RT if and when needed and that CPAP may be placed if pt decompensates when sleeping. Pt verbalizes understanding.

## 2025-07-16 NOTE — PLAN OF CARE
A204/A204 AYALA Lopez is a 60 y.o.male admitted on 7/8/2025 for Closed nondisplaced subtrochanteric fracture of left femur   Code Status: Full Code MRN: [id number]   Review of patient's allergies indicates:   Allergen Reactions    1 plus 1-f Rash    Sulfa (sulfonamide antibiotics) Rash     Past Medical History:   Diagnosis Date    Diabetes mellitus     Hyperlipidemia     Hypertension     EEVLYN (obstructive sleep apnea)     Paroxysmal atrial fibrillation       PRN meds    0.9%  NaCl infusion (for blood administration), , Q24H PRN  0.9%  NaCl infusion (for blood administration), , Q24H PRN  0.9%  NaCl infusion (for blood administration), , Q24H PRN  0.9%  NaCl infusion (for blood administration), , Q24H PRN  acetaminophen, 650 mg, Q4H PRN  aluminum-magnesium hydroxide-simethicone, 30 mL, QID PRN  dextrose 50%, 12.5 g, PRN  dextrose 50%, 25 g, PRN  glucagon (human recombinant), 1 mg, PRN  glucose, 16 g, PRN  glucose, 24 g, PRN  hydrALAZINE, 10 mg, Q6H PRN  HYDROcodone-acetaminophen, 1 tablet, Q4H PRN  insulin aspart U-100, 0-10 Units, QID (AC + HS) PRN  melatonin, 6 mg, Nightly PRN  morphine, 4 mg, Q4H PRN  naloxone, 0.02 mg, PRN  ondansetron, 4 mg, Q12H PRN  polyethylene glycol, 17 g, BID PRN  prochlorperazine, 5 mg, Q6H PRN  promethazine, 25 mg, Q6H PRN  sodium chloride 0.9%, 10 mL, Q12H PRN      Chart check completed. Will continue plan of care. Pt oriented x4.  VSS.  Pt remained afebrile throughout this shift.   All meds administered per order.   Pt remained free of falls this shift.   Plan of care reviewed. Patient verbalizes understanding.   Pt moving/turning x2 assist   Bed low, side rails up x 2, wheels locked, call light in reach.   Patient instructed to call for assistance.  Patient education provided     Pt transported to [facility] via facility transport. All belongings left with pt. IV and TELE removed prior to d/c         Orientation: oriented x 4  [place] Coma Scale Score: 15     Lead Monitored: Lead II  Rhythm: normal sinus rhythm    Cardiac/Telemetry Box Number: [id number]  VTE Core Measure: Pharmacological prophylaxis initiated/maintained Last Bowel Movement: 07/09/25 (miralax given)  Diet Consistent Carbohydrate 2000 Calories (up to 75 gm per meal); Standard Tray  Diet Cardiac  Diet diabetic  Voiding Characteristics: voids spontaneously without difficulty  Peyman Score: 15  Fall Risk Score: 16  Accucheck []   Freq?      Lines/Drains/Airways       None

## 2025-07-16 NOTE — PT/OT/SLP PROGRESS
Occupational Therapy   Treatment    Name: Roland Lopez  MRN: [id number]  Admitting Diagnosis:  Closed nondisplaced subtrochanteric fracture of left femur  6 Days Post-Op    Recommendations:     Discharge Recommendations: High Intensity Therapy  Discharge Equipment Recommendations:  to be determined by next level of care  Barriers to discharge:  None    Assessment:     Roland Lopez is a 60 y.o. male with a medical diagnosis of Closed nondisplaced subtrochanteric fracture of left femur.  He presents with the following performance deficits affecting function are weakness, impaired endurance, impaired self care skills, impaired functional mobility, gait instability, impaired balance, impaired cardiopulmonary response to activity, pain, decreased safety awareness, decreased lower extremity function, decreased upper extremity function, decreased coordination, orthopedic precautions, decreased ROM, edema, impaired skin.     Rehab Prognosis:  Good; patient would benefit from acute skilled OT services to address these deficits and reach maximum level of function.       Plan:     Patient to be seen 2 x/week to address the above listed problems via self-care/home management, therapeutic activities, therapeutic exercises  Plan of Care Expires: 07/26/25  Plan of Care Reviewed with: patient    Subjective     Chief Complaint: I am ready to work  Patient/Family Comments/goals: to get back to PLOF   Pain/Comfort:  Pain Rating 1:  (pt reported and grimacing noted with movement, but unrated)  Location - Side 1: Left  Location 1: hip  Pain Addressed 1:  (ax pacing)    Objective:     Communicated with: nursekareem, prior to session.  Patient found supine with peripheral IV, telemetry upon OT entry to room.    General Precautions: Standard, fall    Orthopedic Precautions:LLE toe touch weight bearing  Braces: N/A  Respiratory Status: Room air     Occupational Performance:     Bed Mobility:    Patient completed Scooting/Bridging with  moderate assistance and 2 persons  Patient completed Supine to Sit with moderate assistance and 2 persons   X8 mins SBA     Functional Mobility/Transfers:  Patient completed Bed <> Chair Transfer using Squat Pivot technique with moderate assistance and of 2 persons with no assistive device  Squat pivot transfer for functional independence   Cues for functional technique   Functional Mobility: not attempted at this time     Activities of Daily Living:  Pt vimal sitting EOB to change gown and shirt with SBA       AMPAC 6 Click ADL: 14    Treatment & Education:  Encouraged completion of B UE AROM therex throughout the day to increase functional strength and activity tolerance needed for ADL completion.  OT role, plan of care, progression of goals, importance of continued OOB activity, ADL/functional transfer and mobility retraining, call don't fall, safety precautions, fall prevention.  Explained importance of sitting OOB to improve CV endurance, and encouraged to complete at least 2 hours throughout the day.   Pt educated on POC and educated on functional rehab potential.    Patient left up in chair with all lines intact, call button in reach, chair alarm on, and nurse present    GOALS:   Multidisciplinary Problems       Occupational Therapy Goals          Problem: Occupational Therapy    Goal Priority Disciplines Outcome Interventions   Occupational Therapy Goal     OT, PT/OT Progressing    Description: O.T. GOALS TO BE MET 7-26-25  PT WILL TOLERATE 1 SET X 15 REPS B UE ROM EXERCISE  PT REQ MAX A WITH BSC  TRANSFERS  PT REQ S WITH TOILET ING  PT REQ S WITH UE DRESSING                       DME Justifications:  No DME recommended requiring DME justifications    Time Tracking:     OT Date of Treatment: 07/16/25  OT Start Time: 1040  OT Stop Time: 1110  OT Total Time (min): 30 min    Billable Minutes:Self Care/Home Management 10  Therapeutic Activity 20  ISABELLE Stoner  OTR/L readily available for conference at the  time of the provision of services- Lisa Lan, OT  OT/SUE: SUE     Number of SUE visits since last OT visit: 3    7/16/2025

## 2025-07-16 NOTE — PLAN OF CARE
[facility] - Telemetry (Hospital)  Discharge Final Note    Primary Care Provider: No, Primary Doctor    Expected Discharge Date: 7/16/2025    Final Discharge Note (most recent)       Final Note - 07/16/25 1512          Final Note    Assessment Type Final Discharge Note     Anticipated Discharge Disposition Rehab Facility        Post-Acute Status    Post-Acute Authorization Placement     Post-Acute Placement Status Set-up Complete/Auth obtained     Discharge Delays None known at this time                     Important Message from Medicare              Follow-up providers       Shiraz Judd PARadhaC   Specialty: Orthopedic Surgery    [address] [place] [address]   Phone: [phone]       Next Steps: Follow up in 2 week(s)    Instructions: -hospital follow up in [facility]    Kat Barajas MD   Specialty: Family Medicine    [address] [place] [address]   Phone: [phone]       Next Steps: Follow up in 1 month(s)    Instructions: -hospital follow up and medication review upon discharge from Inpatient Rehab              After-discharge care                Destination       *[facility]   Service: Inpatient Rehabilitation    [address] [place] [address]   Phone: [phone]                             [place] Disposition: [facility]   Family Notified: Patient at bedside  Transportation: [facility], scheduled 5:00 - 5:30 pm    Number for nurse report given to bedside nurse.

## 2025-07-16 NOTE — PLAN OF CARE
A204/A204 LOKESHAshley Lopez is a 60 y.o.male admitted on 7/8/2025 for Closed nondisplaced subtrochanteric fracture of left femur   Code Status: Full Code MRN: [id number]   Review of patient's allergies indicates:   Allergen Reactions    1 plus 1-f Rash    Sulfa (sulfonamide antibiotics) Rash     Past Medical History:   Diagnosis Date    Diabetes mellitus     Hyperlipidemia     Hypertension     EVELYN (obstructive sleep apnea)     Paroxysmal atrial fibrillation       PRN meds    0.9%  NaCl infusion (for blood administration), , Q24H PRN  0.9%  NaCl infusion (for blood administration), , Q24H PRN  0.9%  NaCl infusion (for blood administration), , Q24H PRN  0.9%  NaCl infusion (for blood administration), , Q24H PRN  acetaminophen, 650 mg, Q4H PRN  aluminum-magnesium hydroxide-simethicone, 30 mL, QID PRN  dextrose 50%, 12.5 g, PRN  dextrose 50%, 25 g, PRN  glucagon (human recombinant), 1 mg, PRN  glucose, 16 g, PRN  glucose, 24 g, PRN  hydrALAZINE, 10 mg, Q6H PRN  HYDROcodone-acetaminophen, 1 tablet, Q4H PRN  insulin aspart U-100, 0-10 Units, QID (AC + HS) PRN  melatonin, 6 mg, Nightly PRN  morphine, 4 mg, Q4H PRN  naloxone, 0.02 mg, PRN  ondansetron, 4 mg, Q12H PRN  polyethylene glycol, 17 g, BID PRN  prochlorperazine, 5 mg, Q6H PRN  promethazine, 25 mg, Q6H PRN  sodium chloride 0.9%, 10 mL, Q12H PRN      Chart check completed.      Orientation: oriented x 4  [place] Coma Scale Score: 15     Lead Monitored: Lead II Rhythm: normal sinus rhythm    Cardiac/Telemetry Box Number: [id number]  VTE Core Measure: Pharmacological prophylaxis initiated/maintained Last Bowel Movement: 07/09/25 (pt does not want any stool softeners/laxatives)  Diet Consistent Carbohydrate 2000 Calories (up to 75 gm per meal); Standard Tray  Voiding Characteristics: voids spontaneously without difficulty  Peyman Score: 15  Fall Risk Score: 16  Accucheck [x]   Freq? AC/HS     Lines/Drains/Airways       Peripheral Intravenous Line  Duration              Peripheral IV 07/11/25 1215 22 G Anterior;Right Forearm 4 days                         Problem: Adult Inpatient Plan of Care  Goal: Plan of Care Review  Outcome: Progressing  Goal: Patient-Specific Goal (Individualized)  Outcome: Progressing  Goal: Absence of Hospital-Acquired Illness or Injury  Outcome: Progressing  Goal: Optimal Comfort and Wellbeing  Outcome: Progressing  Goal: Readiness for Transition of Care  Outcome: Progressing     Problem: Fall Injury Risk  Goal: Absence of Fall and Fall-Related Injury  Outcome: Progressing

## 2025-07-16 NOTE — PLAN OF CARE
TX COMPLETED: facilitated bed mobility with mod A x 2, transfers with mod A x 2, unable to ambulate at this time. Recommend continued PT services to progress functional status.

## 2025-07-16 NOTE — PROGRESS NOTES
[facility]  Progress Note    Patient Name: Roland Lopez  MRN: [id number]  Patient Class: IP- Inpatient   Admission Date: 7/8/2025  Length of Stay: 8 days  Attending Physician: Melanie Torres MD  Primary Care Provider: Emilee, Primary Doctor        Subjective     Principal Problem:Closed nondisplaced subtrochanteric fracture of left femur        HPI:  60 y.o. male with a PMHx of HTN, EVELYN, DM, HLD, and paroxysmal A fib who presented to the Emergency Department for evaluation of a fall which occurred this morning. Pt state he was getting ready for work when he slipped on what he thinks was cat urine in his home. Pt tried to use the installed railings in his home but could not catch himself. Symptoms are constant and moderate in severity. No mitigating or exacerbating factors reported. Associated sxs include left leg pain and left hip pain. Patient denies any nausea or LOC, numbness or tingling. No prior Tx specified. Pt reports he takes eliquis. No further complaints or concerns at this time.   ED course: H/H: 13.3/40.4, Plt 157K, glucose 280, PTT: Wnl, PT/INR:WNL. Given 4mg Morphine for pain in ED.     Left Femur xray, Pelvic xray: There is an acute appearing fracture in the trochanteric portion of the left femur.   CXR: no acute findings, CT head: There is no calvarial fracture or intracranial hemorrhage, other findings insignificant  Consulted Orthopedic Surgery, recommended admission for surgery later today.  I note some minor lab abnormalities that have been stable over time, these are of doubtful clinical significance.       Patient is a full code.   After evaluating the patients clinical presentation, vitals, labs, and risk factors, the attending physician and I decided to admit the patient for monitoring, diagnostics, and treatment due to the risk of deterioration if managed outpatient   Admitted to  for management of Left Femur Fracture.     Overview/Hospital  Course:  7/9 admitted for acute left hip fracture status post slip and fall. Ortho consulted and recommended surgical intervention. Due to recent use of ozempic, surgery for today after being npo x 24h+h. Complains of pain. Denies any problems with anesthesia in the past.   7/10 status post intramedullary maria t insertion for femur fracture. Tolerated well. Physical/occupational therapy when able.  7/11 Transfused 1 u pRbc for post-op anemia with Hgb<7. Renal function concerning for TRACY, started on fluid resuscitation.  7/12:  Transfused another 1u pRbc for post-op anemia with Hgb<7. Renal function improving after started on fluid resuscitation.  PT/OT with recs for high intensity therapy, but patient declined and preferred home health instead  7/13:  Hemoglobin levels remained stable, cautiously resuming home anticoagulation(Eliquis) while trending hemoglobin given concerns for persistent anemia postoperatively requiring multiple blood transfusions. Patient amenable to rehab placement given his debility concerns.  On 7/14/2025, H/H declined to 7/21.6 with PRBCs x 1 unit transfused and iron supplementation initiated. DVT prophylaxis held. Hyperglycemia noted with Lantus BID initiated. CM consulted for Rehab placement. On 7/15/2025, H/H improved post transfusion. RLE edema noted with Eliquis continued at decreased dose.  Awaiting rehab placement. On 7/16/2025, H/H stable with no reason required.  No signs of acute distress witnessed or reported.  Hyperglycemia noted with insulin adjusted as appropriate. Toe-touch weight-bearing to the left lower extremity with PT/OT for gait training and ADLs continued.  Inpatient rehab placement in progress  to assist.    Interval History:  Patient in bed upon exam with pain to LLE controlled in no acute distress.  H&H stable with no transfusion required.  Inpatient rehab placement in progress.  Hyperglycemia noted with insulin adjusted.    Review of Systems    Constitutional:  Positive for activity change. Negative for appetite change and fatigue.   HENT: Negative.     Respiratory: Negative.     Cardiovascular:  Positive for leg swelling.   Gastrointestinal: Negative.    Genitourinary: Negative.    Musculoskeletal:  Positive for gait problem and myalgias.   Skin:  Positive for wound.   Psychiatric/Behavioral: Negative.       Objective:     Vital Signs (Most Recent):  Temp: 97.4 °F (36.3 °C) (07/16/25 0735)  Pulse: 67 (07/16/25 0840)  Resp: 18 (07/16/25 0840)  BP: (!) 155/76 (07/16/25 0814)  SpO2: 99 % (07/16/25 0840) Vital Signs (24h Range):  Temp:  [97.4 °F (36.3 °C)-98.6 °F (37 °C)] 97.4 °F (36.3 °C)  Pulse:  [67-80] 67  Resp:  [18-20] 18  SpO2:  [94 %-99 %] 99 %  BP: (138-165)/(65-79) 155/76     Weight:  (Weight not measured, foot of bed is detatched.)  Body mass index is 42.94 kg/m².    Intake/Output Summary (Last 24 hours) at 7/16/2025 1140  Last data filed at 7/16/2025 1040  Gross per 24 hour   Intake 480 ml   Output 1420 ml   Net -940 ml         Physical Exam  Constitutional:       Appearance: He is obese.   HENT:      Mouth/Throat:      Mouth: Mucous membranes are moist.      Pharynx: Oropharynx is clear.   Cardiovascular:      Rate and Rhythm: Normal rate and regular rhythm.      Pulses: Normal pulses.   Pulmonary:      Effort: Pulmonary effort is normal.      Comments: Decreased bibasilar  Abdominal:      General: Bowel sounds are normal.      Palpations: Abdomen is soft.   Musculoskeletal:         General: Swelling (LLE) and tenderness (LLE) present.   Skin:     General: Skin is warm and dry.      Comments: Surgical incision to Left thigh w/ serous drainage noted    Neurological:      Mental Status: He is alert and oriented to person, place, and time.   Psychiatric:         Mood and Affect: Mood normal.               Significant Labs: All pertinent labs within the past 24 hours have been reviewed.    Significant Imaging: I have reviewed all pertinent imaging  results/findings within the past 24 hours.      Assessment & Plan  Closed nondisplaced subtrochanteric fracture of left femur  Patient had a fall at home PTA, xray left femur: There is an acute appearing fracture in the intertrochanteric portion of the left femur.     Status post intramedullary maria t insertion per ortho  PT/OT consulted with evaluation completed and Rehab recommended   -case management assisting with discharge planning- placement inpatient rehab in progress  Orthopedic surgery consulted, follow-up recs      Liver cirrhosis  MELD 3.0: 8 at 7/10/2025  5:13 AM  MELD-Na: 8 at 7/10/2025  5:13 AM  Calculated from:  Serum Creatinine: 0.7 mg/dL (Using min of 1 mg/dL) at 7/10/2025  5:13 AM  Serum Sodium: 137 mmol/L at 7/10/2025  5:13 AM  Total Bilirubin: 1.2 mg/dL at 7/10/2025  5:13 AM  Serum Albumin: 3.1 g/dL at 7/10/2025  5:13 AM  INR(ratio): 1.1 at 7/8/2025  7:39 AM  Age at listing (hypothetical): 60 years  Sex: Male at 7/10/2025  5:13 AM       Continue chronic meds. Etiology likely ETOH. Will avoid any hepatotoxic meds, and monitor CBC/CMP/INR for synthetic function.   PAF (paroxysmal atrial fibrillation)  Patient has paroxysmal (<7 days) atrial fibrillation. Patient is currently in sinus rhythm. [id number] Score: 1. The patients heart rate in the last 24 hours is as follows:  Pulse  Min: 67  Max: 80     Antiarrhythmics  flecainide tablet 50 mg, Every 12 hours, Oral    Anticoagulants  apixaban tablet 2.5 mg, 2 times daily, Oral    Plan  - Replete lytes with a goal of K>4, Mg >2  - 07/13: Hemoglobin levels remained stable, cautiously resuming home anticoagulation(Eliquis) while trending hemoglobin given concerns for persistent anemia postoperatively requiring multiple blood transfusions.   7/14: H/H trending down post procedure - Eliquis dose decreased   - Patient's afib is currently controlled  -cardiac monitoring    Primary hypertension  Patient's blood pressure range in the last 24 hours was: BP  Min:  138/65  Max: 165/79.The patient's inpatient anti-hypertensive regimen is listed below:  Current Antihypertensives  carvediloL tablet 6.25 mg, 2 times daily with meals, Oral  hydrALAZINE injection 10 mg, Every 6 hours PRN, Intravenous  amLODIPine tablet 5 mg, Daily, Oral    Plan  - BP is improved- current plan of care improved     EVELYN on CPAP  --CPAP @ HS  Recommended patient bring home unit  At risk for hypercapnia     Insulin dependent type 2 diabetes mellitus  Patient's FSGs are controlled on current medication regimen.  Last A1c reviewed-   Lab Results   Component Value Date    HGBA1C 9.9 (H) 07/08/2025     Most recent fingerstick glucose reviewed-   Recent Labs   Lab 07/15/25  1806 07/15/25  2051 07/16/25  0615 07/16/25  1115   POCTGLUCOSE 185* 180* 190* 196*     Current correctional scale  Medium  Maintain anti-hyperglycemic dose as follows-   Antihyperglycemics (From admission, onward)      Start     Stop Route Frequency Ordered    07/16/25 2100  insulin glargine U-100 (Lantus) pen 15 Units         -- SubQ 2 times daily 07/16/25 0821    07/10/25 1722  insulin aspart U-100 pen 0-10 Units         -- SubQ Before meals & nightly PRN 07/10/25 1622          Hold Oral hypoglycemics while patient is in the hospital.  Diabetic education  Moderate dose sliding scale to promote wound healing  -Lantus dose adjusted to BID- dose increased to 15 units BID  Anemia  Anemia is likely due to acute blood loss which was from recent surgical procedure. Most recent hemoglobin and hematocrit are listed below.  Recent Labs     07/14/25  0450 07/15/25  0538 07/16/25  0506   HGB 7.0* 7.9* 8.0*   HCT 21.6* 24.7* 25.0*     Plan  - Monitor serial CBC: Daily  - Transfuse PRBC if patient becomes hemodynamically unstable, symptomatic or H/H drops below 7/21.  - Patient has received 1 units of PRBCs on 7/14/2025 for total of 3 units   - Patient's anemia is currently improved post transfusion  -iron supplementation continued  VTE Risk  Mitigation (From admission, onward)           Ordered     apixaban tablet 2.5 mg  2 times daily         07/14/25 1223     IP VTE HIGH RISK PATIENT  Once         07/08/25 1052     Place sequential compression device  Until discontinued         07/08/25 1052     Reason for No Pharmacological VTE Prophylaxis  Once        Question:  Reasons:  Answer:  IV Heparin w/in 24 hrs. Pre or Post-Op    07/08/25 1052                    Discharge Planning   BRANDIE: 7/16/2025     Code Status: Full Code   Medical Readiness for Discharge Date:   Discharge Plan A: Rehab   Discharge Delays: None known at this time              Please place Justification for DME      Katie Elias NP  Department of Hospital Medicine   [facility] - Telemetry ([facility])

## 2025-07-16 NOTE — ASSESSMENT & PLAN NOTE
Patient has paroxysmal (<7 days) atrial fibrillation. Patient is currently in sinus rhythm. [id number] Score: 1. The patients heart rate in the last 24 hours is as follows:  Pulse  Min: 67  Max: 80     Antiarrhythmics  flecainide tablet 50 mg, Every 12 hours, Oral    Anticoagulants  apixaban tablet 2.5 mg, 2 times daily, Oral    Plan  - Replete lytes with a goal of K>4, Mg >2  - 07/13: Hemoglobin levels remained stable, cautiously resuming home anticoagulation(Eliquis) while trending hemoglobin given concerns for persistent anemia postoperatively requiring multiple blood transfusions.   7/14: H/H trending down post procedure - Eliquis dose decreased   - Patient's afib is currently controlled  -cardiac monitoring

## 2025-07-16 NOTE — ASSESSMENT & PLAN NOTE
Anemia is likely due to acute blood loss which was from recent surgical procedure. Most recent hemoglobin and hematocrit are listed below.  Recent Labs     07/14/25  0450 07/15/25  0538 07/16/25  0506   HGB 7.0* 7.9* 8.0*   HCT 21.6* 24.7* 25.0*     Plan  - Monitor serial CBC: Daily  - Transfuse PRBC if patient becomes hemodynamically unstable, symptomatic or H/H drops below 7/21.  - Patient has received 1 units of PRBCs on 7/14/2025 for total of 3 units   - Patient's anemia is currently improved post transfusion  -iron supplementation continued

## 2025-07-16 NOTE — DISCHARGE INSTRUCTIONS
Please monitor glucose before meals and nightly     Please check CBC weekly for 3 weeks to monitor Hgb/Hct

## 2025-07-16 NOTE — ASSESSMENT & PLAN NOTE
Patient has paroxysmal (<7 days) atrial fibrillation. Patient is currently in sinus rhythm. VTSBD3PRNl Score: 1. The patients heart rate in the last 24 hours is as follows:  Pulse  Min: 67  Max: 80     Antiarrhythmics  flecainide tablet 50 mg, Every 12 hours, Oral    Anticoagulants  apixaban tablet 2.5 mg, 2 times daily, Oral  apixaban tablet, 2 times daily, Oral    Plan  - Replete lytes with a goal of K>4, Mg >2  - 07/13: Hemoglobin levels remained stable, cautiously resuming home anticoagulation(Eliquis) while trending hemoglobin given concerns for persistent anemia postoperatively requiring multiple blood transfusions.   7/14: H/H trending down post procedure - Eliquis dose decreased   - Patient's afib is currently controlled  -cardiac monitoring

## 2025-07-16 NOTE — DISCHARGE SUMMARY
[facility] Medicine  Discharge Summary      Patient Name: Roland Lopez  MRN: [id number]  [facility]: [id number]  Patient Class: IP- Inpatient  Admission Date: 7/8/2025  Hospital Length of Stay: 8 days  Discharge Date and Time: 7/16/2025  5:21 PM  Attending Physician: Dr. Melanie Trores   Discharging Provider: Katie Elias NP  Primary Care Provider: Emilee, Primary Doctor    Primary Care Team: [facility] MEDICINE D    HPI:   60 y.o. male with a PMHx of HTN, EVELYN, DM, HLD, and paroxysmal A fib who presented to the Emergency Department for evaluation of a fall which occurred this morning. Pt state he was getting ready for work when he slipped on what he thinks was cat urine in his home. Pt tried to use the installed railings in his home but could not catch himself. Symptoms are constant and moderate in severity. No mitigating or exacerbating factors reported. Associated sxs include left leg pain and left hip pain. Patient denies any nausea or LOC, numbness or tingling. No prior Tx specified. Pt reports he takes eliquis. No further complaints or concerns at this time.   ED course: H/H: 13.3/40.4, Plt 157K, glucose 280, PTT: Wnl, PT/INR:WNL. Given 4mg Morphine for pain in ED.     Left Femur xray, Pelvic xray: There is an acute appearing fracture in the trochanteric portion of the left femur.   CXR: no acute findings, CT head: There is no calvarial fracture or intracranial hemorrhage, other findings insignificant  Consulted Orthopedic Surgery, recommended admission for surgery later today.  I note some minor lab abnormalities that have been stable over time, these are of doubtful clinical significance.       Patient is a full code.   After evaluating the patients clinical presentation, vitals, labs, and risk factors, the attending physician and I decided to admit the patient for monitoring, diagnostics, and treatment due to the risk of deterioration if managed outpatient   Admitted to  for  management of Left Femur Fracture.     Procedure(s) (LRB):  INSERTION, INTRAMEDULLARY COOKIE, FEMUR, TROCHANTER (Left)      Hospital Course:   7/9 admitted for acute left hip fracture status post slip and fall. Ortho consulted and recommended surgical intervention. Due to recent use of ozempic, surgery for today after being npo x 24h+h. Complains of pain. Denies any problems with anesthesia in the past.   7/10 status post intramedullary cookie insertion for femur fracture. Tolerated well. Physical/occupational therapy when able.  7/11 Transfused 1 u pRbc for post-op anemia with Hgb<7. Renal function concerning for TRACY, started on fluid resuscitation.  7/12:  Transfused another 1u pRbc for post-op anemia with Hgb<7. Renal function improving after started on fluid resuscitation.  PT/OT with recs for high intensity therapy, but patient declined and preferred home health instead  7/13:  Hemoglobin levels remained stable, cautiously resuming home anticoagulation(Eliquis) while trending hemoglobin given concerns for persistent anemia postoperatively requiring multiple blood transfusions. Patient amenable to rehab placement given his debility concerns.  On 7/14/2025, H/H declined to 7/21.6 with PRBCs x 1 unit transfused and iron supplementation initiated. DVT prophylaxis held. Hyperglycemia noted with Lantus BID initiated. CM consulted for Rehab placement. On 7/15/2025, H/H improved post transfusion. RLE edema noted with Eliquis continued at decreased dose.  Awaiting rehab placement. On 7/16/2025, H/H remains stable with no transfusion required.  Surgical dressing stable and intact.  Hyperglycemia noted with insulin adjusted as appropriate. Toe-touch weight-bearing to the left lower extremity with PT/OT for gait training and ADLs continued.  Patient accepted to inpatient rehab.  KUB obtained with results showing bowel gas pattern is normal in appearance with normal appearing amount of fecal material within the colon.  Patient  seen and examined in deemed medically stable for discharge to [facility].  Medications reconciled with ferrous sulfate, Norco, insulin sliding scale, Lantus, and Glycolax prescribed.  Eliquis dose decreased in the setting of anemia.  No signs of acute distress witnessed or reported prior to discharge.  Patient instructed to follow up with PCP and orthopedic surgery upon discharge for further evaluation.     Goals of Care Treatment Preferences:  Code Status: Full Code         Consults:   Consults (From admission, onward)          Status Ordering Provider     Inpatient consult to Social Work/Case Management  Once        Provider:  (Not yet assigned)    Completed MONIQUE MARTINEZ     Inpatient consult to Social Work  Once        Provider:  (Not yet assigned)    Completed MICHELINE URBINA     Inpatient consult to Diabetes educator  Once        Provider:  (Not yet assigned)    Completed CAMI SCOTT            Assessment & Plan      Final Active Diagnoses:    Diagnosis Date Noted POA    PRINCIPAL PROBLEM:  Closed nondisplaced subtrochanteric fracture of left femur [S72.25XA] 07/08/2025 Yes    Anemia [D64.9] 07/14/2025 Yes    Insulin dependent type 2 diabetes mellitus [E11.9, Z79.4] 10/09/2024 Not Applicable    EVELYN on CPAP [G47.33] 10/09/2024 Yes    Primary hypertension [I10] 10/09/2024 Yes    PAF (paroxysmal atrial fibrillation) [I48.0] 10/09/2024 Yes    Liver cirrhosis [K74.60] 10/09/2024 Yes      Problems Resolved During this Admission:       Discharged Condition: stable    Disposition: Rehab Facility    Follow Up:   Contact information for follow-up providers       Shiraz Judd PA-C Follow up in 2 week(s).    Specialty: Orthopedic Surgery  Why: -hospital follow up in Ortho Trauma Clinic  Contact information:  [address] [facility] [address] [phone]               Kat Barajas MD Follow up in 1 month(s).    Specialty: Family Medicine  Why: -hospital follow up and medication  review upon discharge from Inpatient Rehab  Contact information:  [address] [facility]  [place] [address]  [phone]                       Contact information for after-discharge care       Destination       [facility] .    Service: Inpatient Rehabilitation  Contact information:  [address]  [facility] [address]  [phone]                                 Patient Instructions:      Diet Cardiac     Diet diabetic     Notify your health care provider if you experience any of the following:  temperature >100.4     Notify your health care provider if you experience any of the following:  persistent nausea and vomiting or diarrhea     Notify your health care provider if you experience any of the following:  increased confusion or weakness     Notify your health care provider if you experience any of the following:  persistent dizziness, light-headedness, or visual disturbances     Notify your health care provider if you experience any of the following:  severe persistent headache     Notify your health care provider if you experience any of the following:  difficulty breathing or increased cough     Notify your health care provider if you experience any of the following:  severe uncontrolled pain     Notify your health care provider if you experience any of the following:  redness, tenderness, or signs of infection (pain, swelling, redness, odor or green/yellow discharge around incision site)     Weight bearing restrictions (specify):   Order Comments: Toe-touch weight-bearing to the left lower extremity per Orthopedic Surgery       Significant Diagnostic Studies: N/A    Pending Diagnostic Studies:       None           Medications:  Reconciled Home Medications:      Medication List        PAUSE taking these medications      insulin glargine-yfgn 100 unit/mL injection  Wait to take this until your doctor or other care provider  tells you to start again.  Glucose improved on hospital regime- may resume once approved by Provider when discharged from Rehab     Inject 26 Units into the skin every evening.     metFORMIN 1000 MG tablet  Wait to take this until your doctor or other care provider tells you to start again.  Glucose improved on hospital regime- may resume once approved by Provider when discharged from Rehab   Commonly known as: GLUCOPHAGE  Take 1,000 mg by mouth 2 (two) times daily with meals.     semaglutide 0.25 mg or 0.5 mg (2 mg/3 mL) pen injector  Wait to take this until your doctor or other care provider tells you to start again.  Glucose improved on hospital regime- may resume once approved by Provider when discharged from Rehab     Commonly known as: OZEMPIC  Inject 0.25 mg into the skin every 7 days.            START taking these medications      ferrous sulfate 325 (65 FE) MG EC tablet  Take 1 tablet (325 mg total) by mouth once daily.     HYDROcodone-acetaminophen 5-325 mg per tablet  Commonly known as: NORCO  Take 1 tablet by mouth every 12 (twelve) hours as needed for Pain.     insulin aspart U-100 100 unit/mL (3 mL) Inpn pen  Commonly known as: NovoLOG  Inject 0-10 Units into the skin before meals and at bedtime as needed (Hyperglycemia).     insulin glargine U-100 (Lantus) 100 unit/mL (3 mL) Inpn pen  Inject 15 Units into the skin 2 (two) times daily.     polyethylene glycol 17 gram Pwpk  Commonly known as: GLYCOLAX  Take 17 g by mouth 2 (two) times daily as needed for Constipation.            CHANGE how you take these medications      ELIQUIS 2.5 mg Tab  Generic drug: apixaban  Take 1 tablet (2.5 mg total) by mouth 2 (two) times daily.  What changed:   medication strength  how much to take            CONTINUE taking these medications      amLODIPine 10 MG tablet  Commonly known as: NORVASC  Take 1 tablet by mouth once daily.     atorvastatin 40 MG tablet  Commonly known as: LIPITOR  Take 20 mg by mouth once daily.      calcipotriene 0.005 % cream  Commonly known as: DOVONOX  APPLY MODERATE AMOUNT TOPICALLY TWICE A DAY FOR PLAQUE PSORIASIS     carvediloL 6.25 MG tablet  Commonly known as: COREG  Take 1 tablet (6.25 mg total) by mouth 2 (two) times daily with meals.     cholecalciferol (vitamin D3) 50 mcg (2,000 unit) Tab  Commonly known as: VITAMIN D3  Take 50 mcg by mouth once daily.     cyanocobalamin 1000 MCG tablet  Commonly known as: VITAMIN B-12  Take 1 tablet by mouth once daily.     flecainide 50 MG Tab  Commonly known as: TAMBOCOR  Take 1 tablet (50 mg total) by mouth every 12 (twelve) hours.              Indwelling Lines/Drains at time of discharge:   Lines/Drains/Airways       None                       Time spent on the discharge of patient: > 40 minutes         Katie Elias NP  Department of Hospital Medicine  '[place] - Telemetry ([facility])

## 2025-07-17 ENCOUNTER — TELEPHONE (OUTPATIENT)
Dept: ORTHOPEDICS | Facility: CLINIC | Age: 60
End: 2025-07-17
Payer: OTHER GOVERNMENT

## 2025-07-17 NOTE — TELEPHONE ENCOUNTER
Called and scheduled patient for post op visit on July 24.  Patient verbalized understanding and is going to let his care facility know of appt date and time to arrange transport.

## 2025-07-24 ENCOUNTER — HOSPITAL ENCOUNTER (OUTPATIENT)
Dept: RADIOLOGY | Facility: HOSPITAL | Age: 60
Discharge: HOME | End: 2025-07-24
Attending: ORTHOPAEDIC SURGERY

## 2025-07-24 ENCOUNTER — OFFICE VISIT (OUTPATIENT)
Dept: ORTHOPEDICS | Facility: CLINIC | Age: 60
End: 2025-07-24

## 2025-07-24 VITALS
DIASTOLIC BLOOD PRESSURE: 65 MMHG | SYSTOLIC BLOOD PRESSURE: 119 MMHG | HEIGHT: 74 IN | BODY MASS INDEX: 40.43 KG/M2 | TEMPERATURE: 98 F | HEART RATE: 76 BPM | WEIGHT: 315 LBS

## 2025-07-24 DIAGNOSIS — M89.8X5 PAIN IN FEMUR: Primary | ICD-10-CM

## 2025-07-24 DIAGNOSIS — M89.8X5 PAIN OF LEFT FEMUR: Primary | ICD-10-CM

## 2025-07-24 DIAGNOSIS — M89.8X5 PAIN IN FEMUR: ICD-10-CM

## 2025-07-24 DIAGNOSIS — M79.605 LEFT LEG PAIN: Primary | ICD-10-CM

## 2025-07-24 PROCEDURE — 73552 X-RAY EXAM OF FEMUR 2/>: CPT | Mod: TC,LT

## 2025-07-24 PROCEDURE — 99024 POSTOP FOLLOW-UP VISIT: CPT | Mod: ,,, | Performed by: ORTHOPAEDIC SURGERY

## 2025-07-24 PROCEDURE — 99999 PR PBB SHADOW E&M-EST. PATIENT-LVL V: CPT | Mod: PBBFAC,,, | Performed by: ORTHOPAEDIC SURGERY

## 2025-07-24 RX ORDER — HYDROCODONE BITARTRATE AND ACETAMINOPHEN 5; 325 MG/1; MG/1
1 TABLET ORAL EVERY 4 HOURS PRN
Qty: 42 TABLET | Refills: 0 | Status: SHIPPED | OUTPATIENT
Start: 2025-07-24 | End: 2025-07-31

## 2025-07-24 NOTE — PROGRESS NOTES
Patient ID: Roland Lopez  YOB: 1965  MRN: [id number]    Chief Complaint: Post-op Evaluation and Pain of the Left Femur      Referred By: Administration, Kath     History of Present Illness: Roland Lopez is a  60 y.o. male   Data Unavailable with a chief complaint of Post-op Evaluation and Pain of the Left Femur    Assessment and Plan  60-year-old male is 14 days status post left intramedullary cookie to femur.  The surgery was initially for IM cookie but patient was too large and with the fracture location we ended up doing a DHS.  He is in a inpatient rehab facility he has very large man but he feels like he is doing great and most of his hip pain is gone.  He denies any tingling or numbness in his lower extremity  HPI    Past Medical History:   Past Medical History:   Diagnosis Date    Diabetes mellitus     Hyperlipidemia     Hypertension     EVELYN (obstructive sleep apnea)     Paroxysmal atrial fibrillation      Past Surgical History:   Procedure Laterality Date    CYSTOSCOPY W/ RETROGRADES Right 10/9/2024    Procedure: CYSTOSCOPY, WITH RETROGRADE PYELOGRAM;  Surgeon: Shawn Otto MD;  Location: [facility] OR;  Service: Urology;  Laterality: Right;    INTRAMEDULLARY RODDING OF TROCHANTER OF FEMUR Left 7/10/2025    Procedure: INSERTION, INTRAMEDULLARY COOKIE, FEMUR, TROCHANTER;  Surgeon: Kodi Frye Jr., MD;  Location: [facility] OR;  Service: Orthopedics;  Laterality: Left;    TRANSESOPHAGEAL ECHOCARDIOGRAM WITH POSSIBLE CARDIOVERSION (SOFYA W/ POSS CARDIOVERSION) N/A 6/27/2023    Procedure: Transesophageal echo (SOFYA) intra-procedure log documentation;  Surgeon: Santy Martinez MD;  Location: [facility] CATH LAB;  Service: Cardiology;  Laterality: N/A;     No family history on file.  Social History[1]  Medication List with Changes/Refills   Current Medications    AMLODIPINE (NORVASC) 10 MG TABLET    Take 1 tablet by mouth once daily.    APIXABAN (ELIQUIS) 2.5 MG TAB    Take 1 tablet (2.5 mg total) by  mouth 2 (two) times daily.    ATORVASTATIN (LIPITOR) 40 MG TABLET    Take 20 mg by mouth once daily.    CALCIPOTRIENE (DOVONOX) 0.005 % CREAM    APPLY MODERATE AMOUNT TOPICALLY TWICE A DAY FOR PLAQUE PSORIASIS    CARVEDILOL (COREG) 6.25 MG TABLET    Take 1 tablet (6.25 mg total) by mouth 2 (two) times daily with meals.    CHOLECALCIFEROL, VITAMIN D3, (VITAMIN D3) 50 MCG (2,000 UNIT) TAB    Take 50 mcg by mouth once daily.    CYANOCOBALAMIN (VITAMIN B-12) 1000 MCG TABLET    Take 1 tablet by mouth once daily.    FERROUS SULFATE 325 (65 FE) MG EC TABLET    Take 1 tablet (325 mg total) by mouth once daily.    FLECAINIDE (TAMBOCOR) 50 MG TAB    Take 1 tablet (50 mg total) by mouth every 12 (twelve) hours.    INSULIN ASPART U-100 (NOVOLOG) 100 UNIT/ML (3 ML) INPN PEN    Inject 0-10 Units into the skin before meals and at bedtime as needed (Hyperglycemia).    INSULIN GLARGINE U-100, LANTUS, 100 UNIT/ML (3 ML) SUBQ INPN PEN    Inject 15 Units into the skin 2 (two) times daily.    INSULIN GLARGINE-YFGN 100 UNIT/ML SOLN    Inject 26 Units into the skin every evening.    METFORMIN (GLUCOPHAGE) 1000 MG TABLET    Take 1,000 mg by mouth 2 (two) times daily with meals.    POLYETHYLENE GLYCOL (GLYCOLAX) 17 GRAM PWPK    Take 17 g by mouth 2 (two) times daily as needed for Constipation.    SEMAGLUTIDE (OZEMPIC) 0.25 MG OR 0.5 MG (2 MG/3 ML) PEN INJECTOR    Inject 0.25 mg into the skin every 7 days.     Review of patient's allergies indicates:   Allergen Reactions    1 plus 1-f Rash    Sulfa (sulfonamide antibiotics) Rash     ROS    Physical Exam:   Body mass index is 42.94 kg/m².  Vitals:    07/24/25 1346   BP: 119/65   Pulse: 76   Temp: 97.9 °F (36.6 °C)      GENERAL: Well appearing, appropriate for stated age, no acute distress.  CARDIOVASCULAR: Pulses regular by peripheral palpation.  PULMONARY: Respirations are even and non-labored.  NEURO: Awake, alert, and oriented x 3.  PSYCH: Mood & affect are appropriate.  HEENT: Head is  normocephalic and atraumatic.  Ortho/SPM Exam  Skin is healing nicely although he does have a foot and a half long incision there was no drainage there is some mild erythema around the staples but nothing significant  Neurovascular exam of the extremities intact  I am able to logroll of the hip without significant pain    Imaging:    X-Ray KUB  Narrative: EXAMINATION:  XR KUB    CLINICAL HISTORY:  constipation;    COMPARISON:  None    FINDINGS:  The bowel gas pattern is normal in appearance.  There is a normal appearing amount of fecal material within the colon.  There is no pneumoperitoneum.  There is surgical hardware in the proximal portion of the left femur.  Impression: The bowel gas pattern is normal in appearance. There is a normal appearing amount of fecal material within the colon.    Electronically signed by: Zack King MD  Date:    07/16/2025  Time:    14:16      Relevant imaging results reviewed and interpreted by me, discussed with the patient and / or family today.  X-rays show no change in alignment of his subtrochanteric fracture and no change in hardware position.    Other Tests:     None    There are no Patient Instructions on file for this visit.  Provider Note/Medical Decision Making:     Assessment and Plan  60-year-old male is 14 days status post left intramedullary maria t to femur.   He is doing better than I expected it 2 weeks postop.  He is strongly wants to go home because he lives with his father in his his caregiver.  I think that is reasonable for him to go home this Friday on 07/25.  He is going to maintain touchdown weight-bearing until I see him back in the office in 2 weeks.  We will repeat x-rays to make sure there was no changes.  We called him in pain medications today and he will call if there is any questions problems or concerns    I discussed worrisome and red flag signs and symptoms with the patient. The patient expressed understanding and agreed to alert me immediately  or to go to the emergency room if they experience any of these.   Treatment plan was developed with input from the patient/family, and they expressed understanding and agreement with the plan. All questions were answered today.         Kodi Frye MD  Orthopaedic Surgery       Disclaimer: This note was prepared using a voice recognition system and is likely to have sound alike errors within the text.          [1]   Social History  Socioeconomic History    Marital status:    Tobacco Use    Smoking status: Never    Smokeless tobacco: Former     Types: Chew, Snuff     Quit date: 06/2024   Vaping Use    Vaping status: Never Used   Substance and Sexual Activity    Alcohol use: Not Currently     Alcohol/week: 6.0 standard drinks of alcohol     Types: 6 Cans of beer per week     Comment: 6 cans of beer on Saturdays    Drug use: Never    Sexual activity: Yes     Partners: Female     Social Drivers of Health     Financial Resource Strain: Low Risk  (7/9/2025)    Overall Financial Resource Strain (CARDIA)     Difficulty of Paying Living Expenses: Not hard at all   Food Insecurity: No Food Insecurity (7/9/2025)    Hunger Vital Sign     Worried About Running Out of Food in the Last Year: Never true     Ran Out of Food in the Last Year: Never true   Transportation Needs: No Transportation Needs (7/9/2025)    PRAPARE - Transportation     Lack of Transportation (Medical): No     Lack of Transportation (Non-Medical): No   Physical Activity: Inactive (10/9/2024)    Exercise Vital Sign     Days of Exercise per Week: 0 days     Minutes of Exercise per Session: 0 min   Stress: No Stress Concern Present (7/9/2025)    [employer] - Occupational Stress Questionnaire     Feeling of Stress : Not at all   Housing Stability: Low Risk  (7/9/2025)    Housing Stability Vital Sign     Unable to Pay for Housing in the Last Year: No     Homeless in the Last Year: No

## 2025-07-24 NOTE — PATIENT INSTRUCTIONS
Assessment:  Roland Lopez is a 60 y.o. male   Chief Complaint   Patient presents with    Left Femur - Post-op Evaluation, Pain       No diagnosis found.     Plan:  Reviewed xray with patient today.  May discharge from rehab when patient is ready.  Remove sutures today.  Follow up in 2 weeks     Follow-up: 2 weeks or sooner if there are any problems between now and then.    Thank you for choosing [facility] and Dr. Kodi Frye for your orthopedic care. It is our goal to provide you with exceptional care that will help keep you healthy, active, and get you back in the game.    Please do not hesitate to reach out to us via email, phone, or MyChart with any questions, concerns, or feedback.     If you are experiencing pain/discomfort ,or have questions and would like to be connected to the [facility] [place] on-call team, please call this number and specify which Orthopedic / Sports Medicine provider is treating you: [phone]

## 2025-08-14 ENCOUNTER — OFFICE VISIT (OUTPATIENT)
Dept: ORTHOPEDICS | Facility: CLINIC | Age: 60
End: 2025-08-14
Payer: OTHER GOVERNMENT

## 2025-08-14 ENCOUNTER — HOSPITAL ENCOUNTER (OUTPATIENT)
Dept: RADIOLOGY | Facility: HOSPITAL | Age: 60
Discharge: HOME | End: 2025-08-14
Attending: ORTHOPAEDIC SURGERY
Payer: OTHER GOVERNMENT

## 2025-08-14 VITALS
HEART RATE: 72 BPM | SYSTOLIC BLOOD PRESSURE: 159 MMHG | DIASTOLIC BLOOD PRESSURE: 87 MMHG | WEIGHT: 315 LBS | HEIGHT: 74 IN | BODY MASS INDEX: 40.43 KG/M2

## 2025-08-14 DIAGNOSIS — M89.8X5 PAIN OF LEFT FEMUR: Primary | ICD-10-CM

## 2025-08-14 DIAGNOSIS — M89.8X5 PAIN OF LEFT FEMUR: ICD-10-CM

## 2025-08-14 PROCEDURE — 99215 OFFICE O/P EST HI 40 MIN: CPT | Mod: PBBFAC,25 | Performed by: ORTHOPAEDIC SURGERY

## 2025-08-14 PROCEDURE — 99024 POSTOP FOLLOW-UP VISIT: CPT | Mod: ,,, | Performed by: ORTHOPAEDIC SURGERY

## 2025-08-14 PROCEDURE — 99999 PR PBB SHADOW E&M-EST. PATIENT-LVL V: CPT | Mod: PBBFAC,,, | Performed by: ORTHOPAEDIC SURGERY

## 2025-08-14 PROCEDURE — 73552 X-RAY EXAM OF FEMUR 2/>: CPT | Mod: TC,LT

## 2025-08-20 ENCOUNTER — TELEPHONE (OUTPATIENT)
Dept: ORTHOPEDICS | Facility: CLINIC | Age: 60
End: 2025-08-20
Payer: OTHER GOVERNMENT

## 2025-08-25 PROBLEM — D62 ACUTE BLOOD LOSS ANEMIA: Status: RESOLVED | Noted: 2025-07-14 | Resolved: 2025-08-25

## 2025-08-28 ENCOUNTER — TELEPHONE (OUTPATIENT)
Dept: ORTHOPEDICS | Facility: CLINIC | Age: 60
End: 2025-08-28
Payer: OTHER GOVERNMENT

## (undated) DEVICE — GOWN POLY REINF X-LONG XL

## (undated) DEVICE — DRAPE U SPLIT SHEET 54X76IN

## (undated) DEVICE — SUT VICRYL 3-0 CT UNDYED

## (undated) DEVICE — KIT IRRIGATION PUMP SGL ACTION

## (undated) DEVICE — GLOVE SURGICAL LATEX SZ 8

## (undated) DEVICE — COVER LIGHT HANDLE 80/CA

## (undated) DEVICE — PAD ABDOMINAL STERILE 8X10IN

## (undated) DEVICE — SUT VICRYL 1 OB 36 CTX

## (undated) DEVICE — REAMER SHAFT MOD TRINKLE 8X510

## (undated) DEVICE — BIT DRILL QC STRL SS 3.2X145

## (undated) DEVICE — GOWN POLY REINF BRTH SLV XL

## (undated) DEVICE — DRILL T2 ALPH FREHND 4.2X185MM

## (undated) DEVICE — MANIFOLD 4 PORT

## (undated) DEVICE — BOWL STERILE LARGE 32OZ

## (undated) DEVICE — SOL IRRI STRL WATER 1000ML

## (undated) DEVICE — DRAPE T CYSTOSCOPY STERILE

## (undated) DEVICE — CONTAINER SPECIMEN OR STER 4OZ

## (undated) DEVICE — DRAPE INCISE IOBAN 2 23X33IN

## (undated) DEVICE — TAPE SILK 3IN

## (undated) DEVICE — SYR IRRIGATION BULB STER 60ML

## (undated) DEVICE — GUIDE WIRE MOTION .035 X 150CM

## (undated) DEVICE — STAPLER SKIN PROXIMATE WIDE

## (undated) DEVICE — CATH POLLACK OPEN-END FLEXI-TI

## (undated) DEVICE — YANKAUER FLEX NO VENT REG CAP

## (undated) DEVICE — TOWEL OR DISP STRL BLUE 4/PK

## (undated) DEVICE — DRAPE MOBILE C-ARM

## (undated) DEVICE — IMPLANTABLE DEVICE
Type: IMPLANTABLE DEVICE | Site: FEMUR | Status: NON-FUNCTIONAL
Removed: 2025-07-10

## (undated) DEVICE — DRAPE STERI-DRAPE 83X125 IOBAN

## (undated) DEVICE — SPONGE COTTON TRAY 4X4IN

## (undated) DEVICE — SOL NACL IRR 3000ML

## (undated) DEVICE — PACK BASIC SETUP SC BR

## (undated) DEVICE — KIT PT CARE HANA PROFX SSXT

## (undated) DEVICE — GLOVE SIGNATURE ESSNTL LTX 7.5

## (undated) DEVICE — ELECTRODE REM PLYHSV RETURN 9

## (undated) DEVICE — PRECISION PIN TAPERED
Type: IMPLANTABLE DEVICE | Site: HIP | Status: NON-FUNCTIONAL
Brand: GAMMA
Removed: 2025-07-10

## (undated) DEVICE — UROVIEW 2600/2800

## (undated) DEVICE — GUIDE PIN 2.5MM DIA.
Type: IMPLANTABLE DEVICE | Site: FEMUR | Status: NON-FUNCTIONAL
Removed: 2025-07-10

## (undated) DEVICE — TRAY SKIN SCRUB WET 4 COMPART

## (undated) DEVICE — K-WIRE
Type: IMPLANTABLE DEVICE | Site: HIP | Status: NON-FUNCTIONAL
Removed: 2025-07-10

## (undated) DEVICE — ADHESIVE MASTISOL VIAL 48/BX

## (undated) DEVICE — DRAPE C-ARMOR EQUIPMENT COVER

## (undated) DEVICE — PAD RADIOLUCENT STAT ADULT

## (undated) DEVICE — SPONGE LAP 18X18 PREWASHED

## (undated) DEVICE — CANISTER SUCTION JUMBO 12L

## (undated) DEVICE — BLADE EZ CLEAN 2 1/2

## (undated) DEVICE — WIRE GUID STAND STR .038X150CM

## (undated) DEVICE — SYR ONLY LUER LOCK 20CC

## (undated) DEVICE — GUIDE WIRE
Type: IMPLANTABLE DEVICE | Site: HIP | Status: NON-FUNCTIONAL
Brand: T2 ALPHA
Removed: 2025-07-10

## (undated) DEVICE — TAPE SURGICAL MICROFOAM 3IN

## (undated) DEVICE — IRRIGATION SET Y-TYPE TUR/BLAD